# Patient Record
Sex: FEMALE | Race: BLACK OR AFRICAN AMERICAN | NOT HISPANIC OR LATINO | Employment: FULL TIME | URBAN - METROPOLITAN AREA
[De-identification: names, ages, dates, MRNs, and addresses within clinical notes are randomized per-mention and may not be internally consistent; named-entity substitution may affect disease eponyms.]

---

## 2018-06-01 ENCOUNTER — HOSPITAL ENCOUNTER (EMERGENCY)
Facility: HOSPITAL | Age: 33
Discharge: HOME | End: 2018-06-01
Attending: EMERGENCY MEDICINE

## 2018-06-01 VITALS
SYSTOLIC BLOOD PRESSURE: 134 MMHG | TEMPERATURE: 99.1 F | DIASTOLIC BLOOD PRESSURE: 79 MMHG | HEART RATE: 119 BPM | OXYGEN SATURATION: 99 %

## 2018-06-01 DIAGNOSIS — O99.891 ASYMPTOMATIC BACTERIURIA DURING PREGNANCY: ICD-10-CM

## 2018-06-01 DIAGNOSIS — R82.71 ASYMPTOMATIC BACTERIURIA DURING PREGNANCY: ICD-10-CM

## 2018-06-01 DIAGNOSIS — A59.9 TRICHOMONOSIS: ICD-10-CM

## 2018-06-01 DIAGNOSIS — Z3A.12 12 WEEKS GESTATION OF PREGNANCY: Primary | ICD-10-CM

## 2018-06-01 DIAGNOSIS — Z04.1 EXAM FOLLOWING MVC (MOTOR VEHICLE COLLISION), NO APPARENT INJURY: ICD-10-CM

## 2018-06-01 LAB
BACTERIA UR QL AUTO: ABNORMAL /HPF
BILIRUB UR QL STRIP: ABNORMAL
CLARITY UR: CLEAR
COLOR UR: YELLOW
GLUCOSE UR STRIP-MCNC: NEGATIVE MG/DL
HGB UR QL STRIP.AUTO: NEGATIVE
KETONES UR STRIP-MCNC: ABNORMAL MG/DL
LEUKOCYTE ESTERASE UR QL STRIP: NEGATIVE
NITRITE UR QL STRIP: NEGATIVE
NON-SQ EPI CELLS URNS QL MICRO: ABNORMAL /HPF
OTHER STN SPEC: ABNORMAL
PH UR STRIP.AUTO: 6 [PH] (ref 4.5–8)
PROT UR STRIP-MCNC: ABNORMAL MG/DL
RBC #/AREA URNS AUTO: ABNORMAL /HPF
SP GR UR STRIP.AUTO: >=1.03 (ref 1–1.03)
UROBILINOGEN UR QL STRIP.AUTO: 1 E.U./DL
WBC #/AREA URNS AUTO: ABNORMAL /HPF

## 2018-06-01 PROCEDURE — 99284 EMERGENCY DEPT VISIT MOD MDM: CPT

## 2018-06-01 PROCEDURE — 81001 URINALYSIS AUTO W/SCOPE: CPT

## 2018-06-01 RX ORDER — ALBUTEROL SULFATE 90 UG/1
AEROSOL, METERED RESPIRATORY (INHALATION)
COMMUNITY
Start: 2014-09-04 | End: 2019-01-28

## 2018-06-01 RX ORDER — METRONIDAZOLE 500 MG/1
2000 TABLET ORAL ONCE
Qty: 4 TABLET | Refills: 0 | Status: SHIPPED | OUTPATIENT
Start: 2018-06-01 | End: 2018-06-01

## 2018-06-01 RX ORDER — POLYVINYL ALCOHOL 14 MG/ML
SOLUTION/ DROPS OPHTHALMIC
COMMUNITY
Start: 2018-01-24 | End: 2019-01-28

## 2018-06-01 RX ORDER — CEPHALEXIN 500 MG/1
500 CAPSULE ORAL 2 TIMES DAILY
Qty: 14 CAPSULE | Refills: 0 | Status: SHIPPED | OUTPATIENT
Start: 2018-06-01 | End: 2018-06-08

## 2018-06-01 RX ORDER — LABETALOL 100 MG/1
200 TABLET, FILM COATED ORAL
COMMUNITY
Start: 2015-03-18 | End: 2019-01-28
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
## 2019-01-21 ENCOUNTER — HOSPITAL ENCOUNTER (EMERGENCY)
Facility: HOSPITAL | Age: 34
Discharge: HOME | End: 2019-01-21
Attending: EMERGENCY MEDICINE
Payer: COMMERCIAL

## 2019-01-21 VITALS
HEART RATE: 120 BPM | TEMPERATURE: 100 F | HEIGHT: 67 IN | DIASTOLIC BLOOD PRESSURE: 115 MMHG | SYSTOLIC BLOOD PRESSURE: 180 MMHG | RESPIRATION RATE: 18 BRPM | WEIGHT: 289.5 LBS | BODY MASS INDEX: 45.44 KG/M2 | OXYGEN SATURATION: 98 %

## 2019-01-21 DIAGNOSIS — L02.31 ABSCESS OF GLUTEAL CLEFT: Primary | ICD-10-CM

## 2019-01-21 PROCEDURE — 99282 EMERGENCY DEPT VISIT SF MDM: CPT

## 2019-01-21 RX ORDER — CEPHALEXIN 500 MG/1
500 CAPSULE ORAL EVERY 8 HOURS SCHEDULED
Qty: 30 CAPSULE | Refills: 0 | Status: SHIPPED | OUTPATIENT
Start: 2019-01-21 | End: 2019-01-28

## 2019-01-21 NOTE — DISCHARGE INSTRUCTIONS
Abscess   WHAT YOU NEED TO KNOW:   A warm compress may help your abscess drain  Your healthcare provider may make a cut in the abscess so it can drain  You may need surgery to remove an abscess that is on your hands or buttocks  DISCHARGE INSTRUCTIONS:   Return to the emergency department if:   · The area around your abscess becomes very painful, warm, or has red streaks  · You have a fever and chills  · Your heart is beating faster than usual      · You feel faint or confused  Contact your healthcare provider if:   · Your abscess gets bigger or does not get better  · Your abscess returns  · You have questions or concerns about your condition or care  Medicines: You may  need any of the following:  · Antibiotics  help treat a bacterial infection  · Acetaminophen  decreases pain and fever  It is available without a doctor's order  Ask how much to take and how often to take it  Follow directions  Acetaminophen can cause liver damage if not taken correctly  · NSAIDs , such as ibuprofen, help decrease swelling, pain, and fever  This medicine is available with or without a doctor's order  NSAIDs can cause stomach bleeding or kidney problems in certain people  If you take blood thinner medicine, always ask your healthcare provider if NSAIDs are safe for you  Always read the medicine label and follow directions  · Take your medicine as directed  Contact your healthcare provider if you think your medicine is not helping or if you have side effects  Tell him or her if you are allergic to any medicine  Keep a list of the medicines, vitamins, and herbs you take  Include the amounts, and when and why you take them  Bring the list or the pill bottles to follow-up visits  Carry your medicine list with you in case of an emergency  Self-care:   · Apply a warm compress to your abscess  This will help it open and drain  Wet a washcloth in warm, but not hot, water  Apply the compress for 10 minutes  Repeat this 4 times each day  Do not  press on an abscess or try to open it with a needle  You may push the bacteria deeper or into your blood  · Do not share your clothes, towels, or sheets with anyone  This can spread the infection to others  · Wash your hands often  This can help prevent the spread of germs  Use soap and water or an alcohol-based hand rub  Care for your wound after it is drained:   · Care for your wound as directed  If your healthcare provider says it is okay, carefully remove the bandage and gauze packing  You may need to soak the gauze to get it out of your wound  Clean your wound and the area around it as directed  Dry the area and put on new, clean bandages  Change your bandages when they get wet or dirty  · Ask your healthcare provider how to change the gauze in your wound  Keep track of how many pieces of gauze are placed inside the wound  Do not put too much packing in the wound  Do not pack the gauze too tightly in your wound  Follow up with your healthcare provider in 1 to 3 days: You may need to have your packing removed or your bandage changed  Write down your questions so you remember to ask them during your visits  © 2017 [employer]  Information is for End User's use only and may not be sold, redistributed or otherwise used for commercial purposes  All illustrations and images included in CareNotes® are the copyrighted property of [employer]  or [employer]manza  The above information is an  only  It is not intended as medical advice for individual conditions or treatments  Talk to your doctor, nurse or pharmacist before following any medical regimen to see if it is safe and effective for you

## 2019-01-21 NOTE — ED PROVIDER NOTES
History  Chief Complaint   Patient presents with    Abscess     patient states that she feels an abscess in between her buttocks that she noticied several days ago, denies fevers, has drainage and pain at site     Patient is a 65-year-old female who presents today with a chief complaint of abscess the right buttocks  Patient reports she has had this abscess for approximately 5 days with no fevers or chills  Patient reports that initially the pressure was too painful for her to be able to stand up and move however it began to drain yesterday and she has had decrease in pain since  Patient reports she presents to the ER due to the pain still being present in the abscess swelling still present  Patient denies any fevers, chills, headaches, dizziness, lightheadedness, changes in vision, double vision, blurry vision, abdominal pain nausea, vomiting  Patient reports that she forgot to take her blood pressure medication this morning and that she is always tachycardic  Patient reports that just prior to the provider entering the room she took 2 of her labetalol pills which are 200 mg each  History provided by:  Patient   used: No    Abscess   Location:  Pelvis  Pelvic abscess location:  L buttock  Size:  4cm  Abscess quality: draining, fluctuance, painful and warmth    Red streaking: no    Duration:  5 days  Progression:  Partially resolved  Pain details:     Quality:  Aching, pressure and throbbing    Severity:  Moderate    Duration:  5 days    Timing:  Constant    Progression:  Partially resolved  Chronicity:  New  Relieved by:  Draining/squeezing  Worsened by:  Draining/squeezing  Ineffective treatments:  Draining/squeezing  Associated symptoms: no anorexia, no fatigue, no fever, no headaches, no nausea and no vomiting    Risk factors: prior abscess        Prior to Admission Medications   Prescriptions Last Dose Informant Patient Reported? Taking?    albuterol (PROVENTIL HFA,VENTOLIN HFA) 90 mcg/act inhaler   Yes No   Sig: Inhale   labetalol (NORMODYNE) 100 mg tablet   Yes No   Sig: Take 200 mg by mouth   polyvinyl alcohol (LIQUIFILM TEARS) 1 4 % ophthalmic solution   Yes No      Facility-Administered Medications: None       Past Medical History:   Diagnosis Date    Asthma     Hypertension     Pseudotumor cerebri        Past Surgical History:   Procedure Laterality Date    TONSILLECTOMY         History reviewed  No pertinent family history  I have reviewed and agree with the history as documented  Social History   Substance Use Topics    Smoking status: Current Every Day Smoker     Packs/day: 0 50     Types: Cigarettes    Smokeless tobacco: Never Used    Alcohol use No        Review of Systems   Constitutional: Negative for chills, fatigue and fever  HENT: Negative for congestion, ear pain, rhinorrhea and sore throat  Eyes: Negative for redness  Respiratory: Negative for chest tightness and shortness of breath  Cardiovascular: Negative for chest pain and palpitations  Gastrointestinal: Negative for abdominal pain, anorexia, nausea and vomiting  Genitourinary: Negative for dysuria and hematuria  Musculoskeletal: Negative  Skin: Positive for wound ( abscess to right gluteous)  Negative for rash  Neurological: Negative for dizziness, syncope, light-headedness, numbness and headaches  Physical Exam  Physical Exam   Constitutional: She is oriented to person, place, and time  She appears well-developed and well-nourished  HENT:   Head: Normocephalic  Eyes: No scleral icterus  Cardiovascular: Normal rate and regular rhythm  Pulmonary/Chest: Effort normal and breath sounds normal  No stridor  Abdominal: Soft  She exhibits no distension  There is no tenderness  Musculoskeletal: Normal range of motion  Neurological: She is alert and oriented to person, place, and time  Skin: Skin is warm and dry  Capillary refill takes less than 2 seconds  Psychiatric: She has a normal mood and affect  Nursing note and vitals reviewed  Vital Signs  ED Triage Vitals [01/21/19 1643]   Temperature Pulse Respirations Blood Pressure SpO2   100 °F (37 8 °C) (!) 133 18 (!) 183/110 99 %      Temp Source Heart Rate Source Patient Position - Orthostatic VS BP Location FiO2 (%)   Tympanic Monitor Lying Left arm --      Pain Score       --           Vitals:    01/21/19 1643   BP: (!) 183/110   Pulse: (!) 133   Patient Position - Orthostatic VS: Lying       Visual Acuity      ED Medications  Medications - No data to display    Diagnostic Studies  Results Reviewed     None                 No orders to display              Procedures  Incision/Drainage  Date/Time: 1/21/2019 5:10 PM  Performed by: Aileen Flores by: Ramona Millard     Patient location:  ED  Other Assisting Provider: No    Consent:     Consent obtained:  Verbal    Consent given by:  Patient    Risks discussed:  Incomplete drainage    Alternatives discussed:  No treatment  Universal protocol:     Procedure explained and questions answered to patient or proxy's satisfaction: yes      Patient identity confirmed:  Verbally with patient  Location:     Type:  Abscess    Size:  4cm    Location:  Anogenital    Anogenital location:  Gluteal cleft  Anesthesia (see MAR for exact dosages): Anesthesia method:  None  Procedure details:     Complexity:  Simple    Needle aspiration: no      Incision types: Other (comment) (abscess already draining, purulent drainage expressed and cleaned with saline)    Approach:  Open    Wound management:  Irrigated with saline    Drainage:  Bloody and purulent    Drainage amount:  Copious    Wound treatment:  Wound left open    Packing materials:  None  Post-procedure details:     Patient tolerance of procedure:   Tolerated well, no immediate complications           Phone Contacts  ED Phone Contact    ED Course                               GIUSEPPE Ku Time    Disposition  Final diagnoses:   Abscess of gluteal cleft     Time reflects when diagnosis was documented in both MDM as applicable and the Disposition within this note     Time User Action Codes Description Comment    1/21/2019  5:13 PM Rona Duran [L02 31] Abscess of gluteal cleft       ED Disposition     ED Disposition Condition Comment    Discharge  Kevin Padron discharge to home/self care  Condition at discharge: Good        Follow-up Information     Follow up With Specialties Details Why [address] [place] [address] MD Susy [facility]   [address] [place] [address] 305  [address] [place] [address]  [phone]            Patient's Medications   Discharge Prescriptions    CEPHALEXIN (KEFLEX) 500 MG CAPSULE    Take 1 capsule (500 mg total) by mouth every 8 (eight) hours for 10 days       Start Date: 1/21/2019 End Date: 1/31/2019       Order Dose: 500 mg       Quantity: 30 capsule    Refills: 0     No discharge procedures on file      ED Provider  Electronically Signed by           Rainer Forman PA-C  01/21/19 9676

## 2019-01-28 ENCOUNTER — OFFICE VISIT (OUTPATIENT)
Dept: FAMILY MEDICINE CLINIC | Facility: CLINIC | Age: 34
End: 2019-01-28

## 2019-01-28 ENCOUNTER — TELEPHONE (OUTPATIENT)
Dept: FAMILY MEDICINE CLINIC | Facility: CLINIC | Age: 34
End: 2019-01-28

## 2019-01-28 VITALS
WEIGHT: 292 LBS | HEART RATE: 71 BPM | DIASTOLIC BLOOD PRESSURE: 108 MMHG | BODY MASS INDEX: 45.73 KG/M2 | SYSTOLIC BLOOD PRESSURE: 168 MMHG | RESPIRATION RATE: 20 BRPM | OXYGEN SATURATION: 98 % | TEMPERATURE: 97.4 F

## 2019-01-28 DIAGNOSIS — I10 BENIGN ESSENTIAL HTN: Primary | ICD-10-CM

## 2019-01-28 DIAGNOSIS — J45.20 MILD INTERMITTENT ASTHMA WITHOUT COMPLICATION: ICD-10-CM

## 2019-01-28 PROBLEM — K21.9 GASTROESOPHAGEAL REFLUX DISEASE WITHOUT ESOPHAGITIS: Status: ACTIVE | Noted: 2018-07-26

## 2019-01-28 PROCEDURE — 99214 OFFICE O/P EST MOD 30 MIN: CPT | Performed by: FAMILY MEDICINE

## 2019-01-28 RX ORDER — LABETALOL 200 MG/1
TABLET, FILM COATED ORAL
COMMUNITY
Start: 2018-12-24 | End: 2019-02-06 | Stop reason: SDUPTHER

## 2019-01-28 RX ORDER — AMLODIPINE BESYLATE 10 MG/1
10 TABLET ORAL DAILY
Qty: 30 TABLET | Refills: 0 | Status: SHIPPED | OUTPATIENT
Start: 2019-01-28 | End: 2019-02-06 | Stop reason: SDUPTHER

## 2019-01-28 RX ORDER — BLOOD PRESSURE TEST KIT
KIT MISCELLANEOUS DAILY
Qty: 1 EACH | Refills: 0 | Status: SHIPPED | OUTPATIENT
Start: 2019-01-28

## 2019-01-28 NOTE — TELEPHONE ENCOUNTER
Dr James Rosas would like to see patient in 3 weeks with me for HTN follow-up, ok to double book   Patient needs an appt after 10am

## 2019-01-28 NOTE — ASSESSMENT & PLAN NOTE
- Started on CCB and continue BB at this time with plan to wean off in future  - Restrict daily salt intake up to 2 4 g  - Discussed DASH diet, hand out given  - Advised to abstain for alcohol consumption  - Advised to walk 30 minutes a day 5 times a week and practice stress relieving measures such as meditation, yoga, riley chi      S/E of CCB discussed with patient   BP cuff script given  Advised to keep a log of BP and bring them back to next visit  RTC in 2 weeks for nurse visit for BP check

## 2019-01-28 NOTE — PROGRESS NOTES
Assessment/Plan:    Benign essential HTN  - Started on CCB and continue BB at this time with plan to wean off in future  - Restrict daily salt intake up to 2 4 g  - Discussed DASH diet, hand out given  - Advised to abstain for alcohol consumption  - Advised to walk 30 minutes a day 5 times a week and practice stress relieving measures such as meditation, yoga, riley chi      S/E of CCB discussed with patient   BP cuff script given  Advised to keep a log of BP and bring them back to next visit  RTC in 2 weeks for nurse visit for BP check       Mild intermittent asthma without complication  Well controlled on Albuterol PRN         Diagnoses and all orders for this visit:    Benign essential HTN  -     amLODIPine (NORVASC) 10 mg tablet; Take 1 tablet (10 mg total) by mouth daily  -     Blood Pressure KIT; by Does not apply route daily    Mild intermittent asthma without complication    Other orders  -     albuterol (PROVENTIL HFA,VENTOLIN HFA) 90 mcg/act inhaler; Inhale  -     labetalol (NORMODYNE) 200 mg tablet;           Subjective:      Patient ID: Miles Padgett is a 28 y o  female  33F with PMH significant for HTN, gestational DM, Iron deficiency anemia, pseudotumor cereberi (follows with neuro at [facility], due to see them in 3 days), Bipolar depression (not on meds at this time), post-partum now 7 weeks presents for follow-up  Patient was last seen here two years ago  HTN:  Was started on BB and HCTZ however had to stop HCTZ due to hair loss  She is currently taking BB and reports compliance  Smoker of 4 cigs per day x 14 years  Denies ETOH or drug abuse        The following portions of the patient's history were reviewed and updated as appropriate: allergies, current medications, past family history, past medical history, past social history, past surgical history and problem list     Review of Systems   Constitutional: Negative for chills and fever     Respiratory: Negative for cough and shortness of breath  Cardiovascular: Negative for chest pain and palpitations  Endocrine: Negative for polyphagia and polyuria  Musculoskeletal: Negative for back pain  Neurological: Negative for light-headedness, numbness and headaches  Objective:      BP (!) 168/108 (BP Location: Left arm, Patient Position: Sitting, Cuff Size: Large)   Pulse 71   Temp (!) 97 4 °F (36 3 °C)   Resp 20   Wt 132 kg (292 lb)   SpO2 98%   BMI 45 73 kg/m²          Physical Exam   Constitutional: She appears well-developed and well-nourished  HENT:   Head: Normocephalic and atraumatic  Eyes: EOM are normal    Neck: Normal range of motion  Neck supple  Cardiovascular: Normal rate and normal heart sounds  Pulmonary/Chest: Effort normal and breath sounds normal  No respiratory distress  Abdominal: Soft  Bowel sounds are normal  She exhibits no distension  Neurological: She is alert  Skin: Skin is warm and dry  Psychiatric: She has a normal mood and affect

## 2019-02-06 DIAGNOSIS — I10 BENIGN ESSENTIAL HTN: ICD-10-CM

## 2019-02-06 RX ORDER — LABETALOL 200 MG/1
TABLET, FILM COATED ORAL
Qty: 120 TABLET | Refills: 1 | Status: SHIPPED | OUTPATIENT
Start: 2019-02-06 | End: 2019-08-02

## 2019-02-06 RX ORDER — AMLODIPINE BESYLATE 10 MG/1
TABLET ORAL
Qty: 30 TABLET | Refills: 4 | Status: SHIPPED | OUTPATIENT
Start: 2019-02-06 | End: 2019-09-28 | Stop reason: SDUPTHER

## 2019-02-22 ENCOUNTER — OFFICE VISIT (OUTPATIENT)
Dept: FAMILY MEDICINE CLINIC | Facility: CLINIC | Age: 34
End: 2019-02-22

## 2019-02-22 VITALS
RESPIRATION RATE: 18 BRPM | SYSTOLIC BLOOD PRESSURE: 134 MMHG | HEART RATE: 94 BPM | DIASTOLIC BLOOD PRESSURE: 92 MMHG | BODY MASS INDEX: 44.57 KG/M2 | WEIGHT: 284 LBS | TEMPERATURE: 97.6 F | OXYGEN SATURATION: 98 % | HEIGHT: 67 IN

## 2019-02-22 DIAGNOSIS — I10 BENIGN ESSENTIAL HTN: ICD-10-CM

## 2019-02-22 DIAGNOSIS — R00.2 PALPITATIONS: ICD-10-CM

## 2019-02-22 DIAGNOSIS — L65.9 ALOPECIA: Primary | ICD-10-CM

## 2019-02-22 PROCEDURE — 93000 ELECTROCARDIOGRAM COMPLETE: CPT | Performed by: FAMILY MEDICINE

## 2019-02-22 PROCEDURE — 99213 OFFICE O/P EST LOW 20 MIN: CPT | Performed by: FAMILY MEDICINE

## 2019-02-22 NOTE — ASSESSMENT & PLAN NOTE
- BP much better controlled   - Restrict daily salt intake up to 2 4 g  - Discussed DASH diet, hand out given  - Advised to abstain for alcohol consumption  - Advised to walk 30 minutes a day 5 times a week and practice stress relieving measures such as meditation, yoga, riley chi

## 2019-02-22 NOTE — PROGRESS NOTES
Assessment/Plan:    Palpitations  EKG: NSR @ rate 94bpms  No ST-T abnormality  Check TSH, CBC, CMP, A1C and Lipid panel  Advised patient to decrease caffeine intake      Benign essential HTN  - BP much better controlled   - Restrict daily salt intake up to 2 4 g  - Discussed DASH diet, hand out given  - Advised to abstain for alcohol consumption  - Advised to walk 30 minutes a day 5 times a week and practice stress relieving measures such as meditation, yoga, riley chi        Alopecia  Occipital alopecia  Referred to Derm         Diagnoses and all orders for this visit:    Alopecia  -     Ambulatory referral to Dermatology  -     TSH, 3rd generation with Free T4 reflex; Future  -     CBC and differential; Future  -     Comprehensive metabolic panel; Future  -     Lipid Panel with Direct LDL reflex; Future  -     Microalbumin / creatinine urine ratio    Benign essential HTN    Palpitations          Subjective:      Patient ID: Jonathan Mcclain is a 28 y o  female  33F with PMH significant for HTN, gestational DM, Iron deficiency anemia, pseudotumor cereberi (follows with neuro at [facility], due to see them in 3 days), Bipolar depression (not on meds at this time), post-partum x 2 months presents for follow-up of HTN    HTN:  She reports compliance  Denies CP/SOB  +Palpitations  Reports history of tachycardia  States she follows with Cardiologist Dr Skinny Katz of [facility]  Denies swelling in legs/STRONG      The following portions of the patient's history were reviewed and updated as appropriate: allergies, current medications, past family history, past medical history, past social history, past surgical history and problem list     Review of Systems   Constitutional: Negative for chills and fever  Respiratory: Negative for cough and shortness of breath  Cardiovascular: Positive for palpitations  Negative for chest pain  Endocrine: Negative for polyphagia and polyuria  Musculoskeletal: Negative for back pain  Neurological: Negative for light-headedness, numbness and headaches  Objective:      /92 (BP Location: Right arm, Patient Position: Sitting, Cuff Size: Large)   Pulse 94   Temp 97 6 °F (36 4 °C) (Temporal)   Resp 18   Ht 5' 7" (1 702 m)   Wt 129 kg (284 lb)   SpO2 98%   BMI 44 48 kg/m²          Physical Exam   Constitutional: She appears well-developed and well-nourished  HENT:   Head: Normocephalic and atraumatic  Hair is abnormal        Eyes: EOM are normal    Neck: Normal range of motion  Neck supple  Cardiovascular: Normal rate and normal heart sounds  Pulmonary/Chest: Effort normal and breath sounds normal  No respiratory distress  Abdominal: Soft  Bowel sounds are normal  She exhibits no distension  Neurological: She is alert  Skin: Skin is warm and dry  Psychiatric: She has a normal mood and affect

## 2019-02-22 NOTE — ASSESSMENT & PLAN NOTE
EKG: NSR @ rate 94bpms   No ST-T abnormality  Check TSH, CBC, CMP, A1C and Lipid panel  Advised patient to decrease caffeine intake

## 2019-04-08 ENCOUNTER — APPOINTMENT (OUTPATIENT)
Dept: LAB | Facility: HOSPITAL | Age: 34
End: 2019-04-08
Payer: COMMERCIAL

## 2019-04-08 DIAGNOSIS — L65.9 ALOPECIA: ICD-10-CM

## 2019-04-08 LAB
ALBUMIN SERPL BCP-MCNC: 4.3 G/DL (ref 3–5.2)
ALP SERPL-CCNC: 62 U/L (ref 43–122)
ALT SERPL W P-5'-P-CCNC: 15 U/L (ref 9–52)
ANION GAP SERPL CALCULATED.3IONS-SCNC: 11 MMOL/L (ref 5–14)
AST SERPL W P-5'-P-CCNC: 14 U/L (ref 14–36)
BASOPHILS # BLD AUTO: 0 THOUSANDS/ΜL (ref 0–0.1)
BASOPHILS NFR BLD AUTO: 1 % (ref 0–1)
BILIRUB SERPL-MCNC: 0.3 MG/DL
BUN SERPL-MCNC: 11 MG/DL (ref 5–25)
CALCIUM SERPL-MCNC: 9.2 MG/DL (ref 8.4–10.2)
CHLORIDE SERPL-SCNC: 107 MMOL/L (ref 97–108)
CHOLEST SERPL-MCNC: 196 MG/DL
CO2 SERPL-SCNC: 21 MMOL/L (ref 22–30)
CREAT SERPL-MCNC: 0.78 MG/DL (ref 0.6–1.2)
EOSINOPHIL # BLD AUTO: 0.2 THOUSAND/ΜL (ref 0–0.4)
EOSINOPHIL NFR BLD AUTO: 2 % (ref 0–6)
ERYTHROCYTE [DISTWIDTH] IN BLOOD BY AUTOMATED COUNT: 12.9 %
GFR SERPL CREATININE-BSD FRML MDRD: 115 ML/MIN/1.73SQ M
GLUCOSE P FAST SERPL-MCNC: 104 MG/DL (ref 70–99)
HCT VFR BLD AUTO: 34.3 % (ref 36–46)
HDLC SERPL-MCNC: 43 MG/DL (ref 40–59)
HGB BLD-MCNC: 11.3 G/DL (ref 12–16)
LDLC SERPL CALC-MCNC: 134 MG/DL
LYMPHOCYTES # BLD AUTO: 2.3 THOUSANDS/ΜL (ref 0.5–4)
LYMPHOCYTES NFR BLD AUTO: 28 % (ref 25–45)
MCH RBC QN AUTO: 29.7 PG (ref 26–34)
MCHC RBC AUTO-ENTMCNC: 32.9 G/DL (ref 31–36)
MCV RBC AUTO: 90 FL (ref 80–100)
MONOCYTES # BLD AUTO: 0.4 THOUSAND/ΜL (ref 0.2–0.9)
MONOCYTES NFR BLD AUTO: 4 % (ref 1–10)
NEUTROPHILS # BLD AUTO: 5.5 THOUSANDS/ΜL (ref 1.8–7.8)
NEUTS SEG NFR BLD AUTO: 66 % (ref 45–65)
PLATELET # BLD AUTO: 333 THOUSANDS/UL (ref 150–450)
PMV BLD AUTO: 9.5 FL (ref 8.9–12.7)
POTASSIUM SERPL-SCNC: 3.6 MMOL/L (ref 3.6–5)
PROT SERPL-MCNC: 7.6 G/DL (ref 5.9–8.4)
RBC # BLD AUTO: 3.8 MILLION/UL (ref 4–5.2)
SODIUM SERPL-SCNC: 139 MMOL/L (ref 137–147)
TRIGL SERPL-MCNC: 97 MG/DL
TSH SERPL DL<=0.05 MIU/L-ACNC: 1.64 UIU/ML (ref 0.47–4.68)
WBC # BLD AUTO: 8.4 THOUSAND/UL (ref 4.5–11)

## 2019-04-08 PROCEDURE — 85025 COMPLETE CBC W/AUTO DIFF WBC: CPT

## 2019-04-08 PROCEDURE — 80053 COMPREHEN METABOLIC PANEL: CPT

## 2019-04-08 PROCEDURE — 84443 ASSAY THYROID STIM HORMONE: CPT

## 2019-04-08 PROCEDURE — 80061 LIPID PANEL: CPT

## 2019-04-08 PROCEDURE — 36415 COLL VENOUS BLD VENIPUNCTURE: CPT

## 2019-04-12 ENCOUNTER — TELEPHONE (OUTPATIENT)
Dept: FAMILY MEDICINE CLINIC | Facility: CLINIC | Age: 34
End: 2019-04-12

## 2019-04-22 ENCOUNTER — OFFICE VISIT (OUTPATIENT)
Dept: FAMILY MEDICINE CLINIC | Facility: CLINIC | Age: 34
End: 2019-04-22

## 2019-04-22 VITALS
HEIGHT: 67 IN | HEART RATE: 100 BPM | RESPIRATION RATE: 18 BRPM | SYSTOLIC BLOOD PRESSURE: 130 MMHG | OXYGEN SATURATION: 99 % | TEMPERATURE: 98.9 F | DIASTOLIC BLOOD PRESSURE: 80 MMHG | WEIGHT: 293 LBS | BODY MASS INDEX: 45.99 KG/M2

## 2019-04-22 DIAGNOSIS — Z02.89 ENCOUNTER FOR PHYSICAL EXAMINATION RELATED TO EMPLOYMENT: Primary | ICD-10-CM

## 2019-04-22 DIAGNOSIS — Z23 ENCOUNTER FOR IMMUNIZATION: ICD-10-CM

## 2019-04-22 DIAGNOSIS — F31.81 BIPOLAR II DISORDER (HCC): ICD-10-CM

## 2019-04-22 PROCEDURE — 86580 TB INTRADERMAL TEST: CPT

## 2019-04-22 PROCEDURE — 99213 OFFICE O/P EST LOW 20 MIN: CPT | Performed by: PHYSICIAN ASSISTANT

## 2019-04-24 ENCOUNTER — CLINICAL SUPPORT (OUTPATIENT)
Dept: FAMILY MEDICINE CLINIC | Facility: CLINIC | Age: 34
End: 2019-04-24

## 2019-04-24 DIAGNOSIS — Z11.1 ENCOUNTER FOR PPD SKIN TEST READING: Primary | ICD-10-CM

## 2019-04-24 LAB
INDURATION: 0 MM
TB SKIN TEST: NEGATIVE

## 2019-05-18 ENCOUNTER — APPOINTMENT (EMERGENCY)
Dept: RADIOLOGY | Facility: HOSPITAL | Age: 34
End: 2019-05-18
Payer: COMMERCIAL

## 2019-05-18 ENCOUNTER — HOSPITAL ENCOUNTER (EMERGENCY)
Facility: HOSPITAL | Age: 34
Discharge: HOME | End: 2019-05-18
Attending: EMERGENCY MEDICINE | Admitting: EMERGENCY MEDICINE
Payer: COMMERCIAL

## 2019-05-18 VITALS
WEIGHT: 293 LBS | BODY MASS INDEX: 48.34 KG/M2 | RESPIRATION RATE: 20 BRPM | TEMPERATURE: 98.4 F | SYSTOLIC BLOOD PRESSURE: 177 MMHG | DIASTOLIC BLOOD PRESSURE: 106 MMHG | OXYGEN SATURATION: 97 % | HEART RATE: 85 BPM

## 2019-05-18 DIAGNOSIS — S69.91XA RIGHT WRIST INJURY, INITIAL ENCOUNTER: Primary | ICD-10-CM

## 2019-05-18 PROCEDURE — 29125 APPL SHORT ARM SPLINT STATIC: CPT | Performed by: PHYSICIAN ASSISTANT

## 2019-05-18 PROCEDURE — 99283 EMERGENCY DEPT VISIT LOW MDM: CPT | Performed by: PHYSICIAN ASSISTANT

## 2019-05-18 PROCEDURE — 99283 EMERGENCY DEPT VISIT LOW MDM: CPT

## 2019-05-18 PROCEDURE — 73110 X-RAY EXAM OF WRIST: CPT

## 2019-05-18 RX ORDER — ACETAMINOPHEN 500 MG
500 TABLET ORAL EVERY 6 HOURS PRN
Qty: 30 TABLET | Refills: 0 | Status: SHIPPED | OUTPATIENT
Start: 2019-05-18 | End: 2020-03-05

## 2019-05-18 RX ORDER — AMLODIPINE BESYLATE 5 MG/1
10 TABLET ORAL ONCE
Status: COMPLETED | OUTPATIENT
Start: 2019-05-18 | End: 2019-05-18

## 2019-05-18 RX ORDER — ACETAMINOPHEN 325 MG/1
650 TABLET ORAL ONCE
Status: COMPLETED | OUTPATIENT
Start: 2019-05-18 | End: 2019-05-18

## 2019-05-18 RX ORDER — LABETALOL 100 MG/1
200 TABLET, FILM COATED ORAL ONCE
Status: COMPLETED | OUTPATIENT
Start: 2019-05-18 | End: 2019-05-18

## 2019-05-18 RX ADMIN — ACETAMINOPHEN 650 MG: 325 TABLET ORAL at 09:20

## 2019-05-18 RX ADMIN — LABETALOL HYDROCHLORIDE 200 MG: 100 TABLET, FILM COATED ORAL at 09:20

## 2019-05-18 RX ADMIN — AMLODIPINE BESYLATE 10 MG: 5 TABLET ORAL at 09:20

## 2019-05-29 ENCOUNTER — HOSPITAL ENCOUNTER (EMERGENCY)
Facility: HOSPITAL | Age: 34
Discharge: HOME | End: 2019-05-29
Attending: EMERGENCY MEDICINE
Payer: COMMERCIAL

## 2019-05-29 VITALS
SYSTOLIC BLOOD PRESSURE: 169 MMHG | OXYGEN SATURATION: 100 % | DIASTOLIC BLOOD PRESSURE: 90 MMHG | BODY MASS INDEX: 49.02 KG/M2 | RESPIRATION RATE: 20 BRPM | TEMPERATURE: 100.1 F | HEART RATE: 111 BPM | WEIGHT: 293 LBS

## 2019-05-29 DIAGNOSIS — J02.9 VIRAL PHARYNGITIS: Primary | ICD-10-CM

## 2019-05-29 LAB — S PYO AG THROAT QL: NEGATIVE

## 2019-05-29 PROCEDURE — 87430 STREP A AG IA: CPT | Performed by: PHYSICIAN ASSISTANT

## 2019-05-29 PROCEDURE — 99283 EMERGENCY DEPT VISIT LOW MDM: CPT

## 2019-05-29 PROCEDURE — 99282 EMERGENCY DEPT VISIT SF MDM: CPT | Performed by: PHYSICIAN ASSISTANT

## 2019-05-29 RX ORDER — LORATADINE 10 MG/1
10 TABLET ORAL DAILY
Qty: 30 TABLET | Refills: 0 | Status: SHIPPED | OUTPATIENT
Start: 2019-05-29 | End: 2020-03-05

## 2019-05-29 RX ORDER — ACETAMINOPHEN 500 MG
500 TABLET ORAL EVERY 6 HOURS PRN
Qty: 30 TABLET | Refills: 0 | Status: SHIPPED | OUTPATIENT
Start: 2019-05-29 | End: 2022-07-13

## 2019-08-02 ENCOUNTER — OFFICE VISIT (OUTPATIENT)
Dept: FAMILY MEDICINE CLINIC | Facility: CLINIC | Age: 34
End: 2019-08-02

## 2019-08-02 VITALS
BODY MASS INDEX: 45.99 KG/M2 | HEIGHT: 67 IN | HEART RATE: 105 BPM | RESPIRATION RATE: 18 BRPM | SYSTOLIC BLOOD PRESSURE: 148 MMHG | TEMPERATURE: 97.8 F | OXYGEN SATURATION: 97 % | DIASTOLIC BLOOD PRESSURE: 96 MMHG | WEIGHT: 293 LBS

## 2019-08-02 DIAGNOSIS — I10 BENIGN ESSENTIAL HTN: ICD-10-CM

## 2019-08-02 DIAGNOSIS — E66.01 MORBID OBESITY (HCC): Primary | ICD-10-CM

## 2019-08-02 PROBLEM — L65.9 ALOPECIA: Status: RESOLVED | Noted: 2019-02-22 | Resolved: 2019-08-02

## 2019-08-02 PROBLEM — R00.2 PALPITATIONS: Status: RESOLVED | Noted: 2019-02-22 | Resolved: 2019-08-02

## 2019-08-02 PROCEDURE — 99213 OFFICE O/P EST LOW 20 MIN: CPT | Performed by: FAMILY MEDICINE

## 2019-08-02 RX ORDER — LABETALOL 200 MG/1
200 TABLET, FILM COATED ORAL 2 TIMES DAILY
COMMUNITY
End: 2019-11-04

## 2019-08-02 NOTE — ASSESSMENT & PLAN NOTE
Well controlled as patient reports checking and is consistently below 130/80    Plan to continue with labetalol 200 mg 2 times a day along with amlodipine 10 mg daily     - Restrict daily salt intake up to 2 4 g  - Discussed DASH diet, hand out given  - Advised to abstain for alcohol consumption  - Advised to walk 30 minutes a day 5 times a week and practice stress relieving measures such as meditation, yoga, riley chi

## 2019-08-02 NOTE — PROGRESS NOTES
Assessment/Plan:    Benign essential HTN  Well controlled as patient reports checking and is consistently below 130/80  Plan to continue with labetalol 200 mg 2 times a day along with amlodipine 10 mg daily     - Restrict daily salt intake up to 2 4 g  - Discussed DASH diet, hand out given  - Advised to abstain for alcohol consumption  - Advised to walk 30 minutes a day 5 times a week and practice stress relieving measures such as meditation, yoga, riley chi        Morbid obesity ([facility] [address] )  Advised patient to discuss with her Ob to stop depo shots as it may cause weight gain  Plan to refer for bariatric surgery in future visit  In the interim, patient is encouraged to continue to work on diet and exercise  Diagnoses and all orders for this visit:    Morbid obesity ([address] )    Benign essential HTN    Other orders  -     labetalol (NORMODYNE) 200 mg tablet; Take 200 mg by mouth 2 (two) times a day          Subjective:      Patient ID: Sarwat Gao is a 28 y o  female  Patient is a 79-year-old female with past medical history significant for morbid obesity, HTN who presents for follow-up  Reports doing well and denies any complaints  Reports frustration with her weight gain  She is currently on Depo shots by her OB  The following portions of the patient's history were reviewed and updated as appropriate: allergies, current medications and problem list     Review of Systems   Constitutional: Negative for chills and fever  Respiratory: Negative for cough and shortness of breath  Cardiovascular: Negative for chest pain and palpitations  Endocrine: Negative for polyphagia and polyuria  Musculoskeletal: Negative for back pain  Neurological: Negative for light-headedness, numbness and headaches  Objective:      /96 (BP Location: Left arm, Patient Position: Sitting, Cuff Size: Large) Comment: pt did not take med today    Pulse 105   Temp 97 8 °F (36 6 °C) (Temporal)   Resp 18   Ht 5' 7" (1 702 m)   Wt (!) 142 kg (313 lb)   SpO2 97%   BMI 49 02 kg/m²          Physical Exam   Constitutional: She appears well-developed and well-nourished  HENT:   Head: Normocephalic and atraumatic  Eyes: EOM are normal    Neck: Normal range of motion  Neck supple  Cardiovascular: Normal rate and normal heart sounds  Pulmonary/Chest: Effort normal and breath sounds normal  No respiratory distress  Abdominal: Soft  Bowel sounds are normal  She exhibits no distension  Neurological: She is alert  Skin: Skin is warm and dry  Psychiatric: She has a normal mood and affect

## 2019-08-02 NOTE — ASSESSMENT & PLAN NOTE
Advised patient to discuss with her Ob to stop depo shots as it may cause weight gain  Plan to refer for bariatric surgery in future visit  In the interim, patient is encouraged to continue to work on diet and exercise

## 2019-09-28 DIAGNOSIS — I10 BENIGN ESSENTIAL HTN: ICD-10-CM

## 2019-09-30 RX ORDER — AMLODIPINE BESYLATE 10 MG/1
TABLET ORAL
Qty: 30 TABLET | Refills: 0 | Status: SHIPPED | OUTPATIENT
Start: 2019-09-30 | End: 2019-11-04 | Stop reason: SDUPTHER

## 2019-11-04 ENCOUNTER — OFFICE VISIT (OUTPATIENT)
Dept: FAMILY MEDICINE CLINIC | Facility: CLINIC | Age: 34
End: 2019-11-04

## 2019-11-04 VITALS
HEART RATE: 90 BPM | SYSTOLIC BLOOD PRESSURE: 140 MMHG | RESPIRATION RATE: 18 BRPM | BODY MASS INDEX: 45.99 KG/M2 | OXYGEN SATURATION: 98 % | WEIGHT: 293 LBS | DIASTOLIC BLOOD PRESSURE: 84 MMHG | TEMPERATURE: 97.5 F | HEIGHT: 67 IN

## 2019-11-04 DIAGNOSIS — J45.20 MILD INTERMITTENT ASTHMA WITHOUT COMPLICATION: Primary | ICD-10-CM

## 2019-11-04 DIAGNOSIS — Z72.0 TOBACCO USE: ICD-10-CM

## 2019-11-04 DIAGNOSIS — I10 BENIGN ESSENTIAL HTN: ICD-10-CM

## 2019-11-04 PROCEDURE — 90471 IMMUNIZATION ADMIN: CPT | Performed by: INTERNAL MEDICINE

## 2019-11-04 PROCEDURE — 99213 OFFICE O/P EST LOW 20 MIN: CPT | Performed by: INTERNAL MEDICINE

## 2019-11-04 PROCEDURE — 3008F BODY MASS INDEX DOCD: CPT | Performed by: INTERNAL MEDICINE

## 2019-11-04 PROCEDURE — 90682 RIV4 VACC RECOMBINANT DNA IM: CPT | Performed by: INTERNAL MEDICINE

## 2019-11-04 RX ORDER — AMLODIPINE BESYLATE 10 MG/1
10 TABLET ORAL DAILY
Qty: 90 TABLET | Refills: 3 | Status: SHIPPED | OUTPATIENT
Start: 2019-11-04 | End: 2020-10-27 | Stop reason: SDUPTHER

## 2019-11-04 RX ORDER — HYDROCHLOROTHIAZIDE 25 MG/1
25 TABLET ORAL DAILY
Qty: 30 TABLET | Refills: 0 | Status: SHIPPED | OUTPATIENT
Start: 2019-11-04 | End: 2019-12-31 | Stop reason: SDUPTHER

## 2019-11-04 RX ORDER — LABETALOL 100 MG/1
100 TABLET, FILM COATED ORAL 2 TIMES DAILY
Qty: 60 TABLET | Refills: 0 | Status: SHIPPED | OUTPATIENT
Start: 2019-11-04 | End: 2019-12-31 | Stop reason: SDUPTHER

## 2019-11-04 RX ORDER — ALBUTEROL SULFATE 90 UG/1
2 AEROSOL, METERED RESPIRATORY (INHALATION) EVERY 6 HOURS PRN
Qty: 2 INHALER | Refills: 2 | Status: SHIPPED | OUTPATIENT
Start: 2019-11-04 | End: 2022-04-07 | Stop reason: SDUPTHER

## 2019-11-04 NOTE — ASSESSMENT & PLAN NOTE
Discussed against harmful affects of smoking with patient including various cancers  Offered nicotine replacement however given multiple stressors in household currently, will hold off inpatient patient to quit smoking  Reassess on next visit

## 2019-11-04 NOTE — ASSESSMENT & PLAN NOTE
Not in exacerbation currently  However given that patient is a  prescribed her two inhalers, 1 to keep in the bus and 1 to keep inside the house  Received flu shot today

## 2019-11-04 NOTE — ASSESSMENT & PLAN NOTE
These instructions were attached to AVS:  Start hydrochlorothiazide 25 mg 1 tablet by mouth daily  Start labetalol 100 mg 1 tablet by mouth 2 times a day  Continue with amlodipine 10 mg 1 tab by mouth daily  Check BMP (basic metabolic panel) in 3 weeks  Follow-up appointment with me, PCP, in 1 month to check on blood pressure

## 2019-11-04 NOTE — PATIENT INSTRUCTIONS
Start hydrochlorothiazide 25 mg 1 tablet by mouth daily  Start labetalol 100 mg 1 tablet by mouth 2 times a day  Continue with amlodipine 10 mg 1 tab by mouth daily  Check BMP (basic metabolic panel) in 3 weeks  Follow-up appointment with me, PCP, in 1 month to check on blood pressure

## 2019-11-04 NOTE — PROGRESS NOTES
Assessment/Plan:    Benign essential HTN  These instructions were attached to AVS:  Start hydrochlorothiazide 25 mg 1 tablet by mouth daily  Start labetalol 100 mg 1 tablet by mouth 2 times a day  Continue with amlodipine 10 mg 1 tab by mouth daily  Check BMP (basic metabolic panel) in 3 weeks  Follow-up appointment with me, PCP, in 1 month to check on blood pressure  Mild intermittent asthma without complication  Not in exacerbation currently  However given that patient is a  prescribed her two inhalers, 1 to keep in the bus and 1 to keep inside the house  Received flu shot today  Tobacco use  Discussed against harmful affects of smoking with patient including various cancers  Offered nicotine replacement however given multiple stressors in household currently, will hold off inpatient patient to quit smoking  Reassess on next visit  Diagnoses and all orders for this visit:    Mild intermittent asthma without complication  -     albuterol (PROVENTIL HFA,VENTOLIN HFA) 90 mcg/act inhaler; Inhale 2 puffs every 6 (six) hours as needed for wheezing or shortness of breath  -     influenza vaccine, [id number], quadrivalent, recombinant, PF, 0 5 mL, for patients 18 yr+ (FLUBLOK)    Benign essential HTN  -     labetalol (NORMODYNE) 100 mg tablet; Take 1 tablet (100 mg total) by mouth 2 (two) times a day  -     amLODIPine (NORVASC) 10 mg tablet; Take 1 tablet (10 mg total) by mouth daily  -     hydrochlorothiazide (HYDRODIURIL) 25 mg tablet; Take 1 tablet (25 mg total) by mouth daily  -     Basic metabolic panel; Future    Tobacco use          Subjective:      Patient ID: Yves Hatchet is a 27 y o  female  Patient is a 41-year-old female with past medical history significant for HTN, benign intracranial hypertension, bipolar disorder who returns for follow-up  HTN  Currently managed with amlodipine 10 mg daily along with labetalol 200 mg 2 times daily    She reports compliance with this regimen  She checks her blood pressure often at home and averages 140-150/  Denies CP, STRONG or SOB  Denies headaches or visual changes  Denies any symptomatology to suggest side effects from this regimen  She was initially seen by me in office in   At that time she was on labetalol 200 mg b i d  due to history of gestational HTN  She is now postpartum more than a year ago  Recently amlodipine was added to her regimen due to continous elevated blood pressure  Today blood pressure is, 140/84  Tobacco abuse  Continues to smoke 1 pack per day  States this very difficult to quit as she has had numerous stressors recently  Her older son was diagnosed with pneumonia and was sent home from school  Her 2nd son developed hand-foot-mouth disease in give it to the   States the new born is currently battling hypoxemia  This has been very stressful for her  States this difficult for her to think about quitting until her kids feel better  Review of Systems   Constitutional: Negative for chills and fever  Respiratory: Negative for cough and shortness of breath  Cardiovascular: Negative for chest pain and palpitations  Endocrine: Negative for polyphagia and polyuria  Musculoskeletal: Negative for back pain  Neurological: Negative for light-headedness, numbness and headaches  Objective:      /84 (BP Location: Left arm, Patient Position: Sitting, Cuff Size: Large)   Pulse 90   Temp 97 5 °F (36 4 °C) (Temporal)   Resp 18   Ht 5' 7" (1 702 m)   Wt (!) 144 kg (317 lb)   BMI 49 65 kg/m²          Physical Exam   Constitutional: She appears well-developed and well-nourished  HENT:   Head: Normocephalic and atraumatic  Eyes: EOM are normal    Neck: Normal range of motion  Neck supple  Cardiovascular: Normal rate and normal heart sounds  Pulmonary/Chest: Effort normal and breath sounds normal  No respiratory distress  Abdominal: Soft   Bowel sounds are normal  She exhibits no distension  Neurological: She is alert  Skin: Skin is warm and dry  Psychiatric: She has a normal mood and affect

## 2019-12-31 DIAGNOSIS — I10 BENIGN ESSENTIAL HTN: ICD-10-CM

## 2020-01-02 RX ORDER — LABETALOL 100 MG/1
100 TABLET, FILM COATED ORAL 2 TIMES DAILY
Qty: 60 TABLET | Refills: 0 | Status: SHIPPED | OUTPATIENT
Start: 2020-01-02 | End: 2020-02-24

## 2020-01-02 RX ORDER — HYDROCHLOROTHIAZIDE 25 MG/1
25 TABLET ORAL DAILY
Qty: 30 TABLET | Refills: 0 | Status: SHIPPED | OUTPATIENT
Start: 2020-01-02 | End: 2020-03-05

## 2020-02-24 DIAGNOSIS — I10 BENIGN ESSENTIAL HTN: ICD-10-CM

## 2020-02-24 RX ORDER — LABETALOL 100 MG/1
TABLET, FILM COATED ORAL
Qty: 60 TABLET | Refills: 0 | Status: SHIPPED | OUTPATIENT
Start: 2020-02-24 | End: 2020-03-23 | Stop reason: SDUPTHER

## 2020-03-05 ENCOUNTER — OFFICE VISIT (OUTPATIENT)
Dept: FAMILY MEDICINE CLINIC | Facility: CLINIC | Age: 35
End: 2020-03-05

## 2020-03-05 VITALS
BODY MASS INDEX: 45.99 KG/M2 | HEIGHT: 67 IN | DIASTOLIC BLOOD PRESSURE: 80 MMHG | SYSTOLIC BLOOD PRESSURE: 130 MMHG | HEART RATE: 97 BPM | WEIGHT: 293 LBS | RESPIRATION RATE: 18 BRPM | TEMPERATURE: 97 F

## 2020-03-05 DIAGNOSIS — I10 BENIGN ESSENTIAL HTN: ICD-10-CM

## 2020-03-05 DIAGNOSIS — F43.23 ADJUSTMENT DISORDER WITH MIXED ANXIETY AND DEPRESSED MOOD: Primary | ICD-10-CM

## 2020-03-05 PROCEDURE — 99213 OFFICE O/P EST LOW 20 MIN: CPT | Performed by: INTERNAL MEDICINE

## 2020-03-05 PROCEDURE — 3075F SYST BP GE 130 - 139MM HG: CPT | Performed by: INTERNAL MEDICINE

## 2020-03-05 PROCEDURE — 3008F BODY MASS INDEX DOCD: CPT | Performed by: INTERNAL MEDICINE

## 2020-03-05 PROCEDURE — 3079F DIAST BP 80-89 MM HG: CPT | Performed by: INTERNAL MEDICINE

## 2020-03-05 PROCEDURE — T1015 CLINIC SERVICE: HCPCS | Performed by: INTERNAL MEDICINE

## 2020-03-05 RX ORDER — HYDROCHLOROTHIAZIDE 25 MG/1
25 TABLET ORAL DAILY
Qty: 90 TABLET | Refills: 3 | Status: SHIPPED | OUTPATIENT
Start: 2020-03-05 | End: 2021-03-19 | Stop reason: ALTCHOICE

## 2020-03-05 NOTE — PROGRESS NOTES
Assessment/Plan:    Adjustment disorder  Unfortunately due to passing of her mother  Offered to send message to our behavioral psychologist Dr Christopher Parikh to arrange an appointment  Patient declines at this time  Alternative therapies also discussed including: exercise, music, light reading, watching comedies, gratitude list, asking family members for help/support  Reassess at next visit  Benign essential HTN  - Discussed with patient that goal BP <150/90 in the absence of DM or CKD  -controlled:  Continue with amlodipine 10 mg daily, hydrochlorothiazide 25 mg daily and decrease labetalol from 100 mg BID to 100mg QD  - Restrict daily salt intake up to 2 4 g  - Discussed DASH diet, hand out given  - Advised to abstain for alcohol consumption  - Advised to walk 30 minutes a day 5 times a week and practice stress relieving measures such as meditation, yoga, riley chi      -     HEMOGLOBIN A1C W/ EAG ESTIMATION; Future  -     Basic metabolic panel; Future  -     hydrochlorothiazide (HYDRODIURIL) 25 mg tablet; Take 1 tablet (25 mg total) by mouth daily        Subjective:      Patient ID: Christelle Corea is a 27 y o  female  34F with PMh singificant for HTN returns for follow-up     HTN  Currently managed with amlodipine 10 mg daily, hydrochlorothiazide 25 mg daily, labetalol 100 mg b i d  Denies chest pain, difficulty breathing, dyspnea on exertion, dizziness or weakness  States she is very sad because her mother passed away about 2 months ago  Mother was ill and suffered cardiac arrest   Patient to care of mother during her last days  She is appreciative of her time with her mother  Tearful in office  Review of Systems   Constitutional: Negative for chills and fever  Respiratory: Negative for cough and shortness of breath  Cardiovascular: Negative for chest pain and palpitations  Endocrine: Negative for polyphagia and polyuria  Musculoskeletal: Negative for back pain     Neurological: Negative for light-headedness, numbness and headaches  Objective:      /80   Pulse 97   Temp (!) 97 °F (36 1 °C) (Temporal)   Resp 18   Ht 5' 7" (1 702 m)   Wt (!) 142 kg (312 lb)   BMI 48 87 kg/m²          Physical Exam   Constitutional: She appears well-developed and well-nourished  She appears distressed  HENT:   Head: Normocephalic and atraumatic  Eyes: EOM are normal    Neck: Normal range of motion  Neck supple  Cardiovascular: Normal rate and normal heart sounds  Pulmonary/Chest: Effort normal and breath sounds normal  No respiratory distress  Abdominal: Soft  Bowel sounds are normal  She exhibits no distension  Neurological: She is alert  Skin: Skin is warm and dry  Psychiatric: Her mood appears anxious  She exhibits a depressed mood

## 2020-03-23 ENCOUNTER — TELEPHONE (OUTPATIENT)
Dept: FAMILY MEDICINE CLINIC | Facility: CLINIC | Age: 35
End: 2020-03-23

## 2020-03-23 DIAGNOSIS — I10 BENIGN ESSENTIAL HTN: ICD-10-CM

## 2020-03-23 RX ORDER — LABETALOL 100 MG/1
100 TABLET, FILM COATED ORAL DAILY
Qty: 60 TABLET | Refills: 0 | Status: SHIPPED | OUTPATIENT
Start: 2020-03-23 | End: 2020-06-01

## 2020-03-26 ENCOUNTER — TELEPHONE (OUTPATIENT)
Dept: FAMILY MEDICINE CLINIC | Facility: CLINIC | Age: 35
End: 2020-03-26

## 2020-04-06 ENCOUNTER — TELEMEDICINE (OUTPATIENT)
Dept: FAMILY MEDICINE CLINIC | Facility: CLINIC | Age: 35
End: 2020-04-06

## 2020-04-06 DIAGNOSIS — I10 BENIGN ESSENTIAL HTN: Primary | ICD-10-CM

## 2020-04-06 DIAGNOSIS — F32.A DEPRESSIVE DISORDER: ICD-10-CM

## 2020-04-06 PROCEDURE — G2012 BRIEF CHECK IN BY MD/QHP: HCPCS | Performed by: INTERNAL MEDICINE

## 2020-04-06 PROCEDURE — T1015 CLINIC SERVICE: HCPCS | Performed by: INTERNAL MEDICINE

## 2020-04-16 ENCOUNTER — PATIENT OUTREACH (OUTPATIENT)
Dept: FAMILY MEDICINE CLINIC | Facility: CLINIC | Age: 35
End: 2020-04-16

## 2020-04-27 ENCOUNTER — TELEPHONE (OUTPATIENT)
Dept: FAMILY MEDICINE CLINIC | Facility: CLINIC | Age: 35
End: 2020-04-27

## 2020-04-27 DIAGNOSIS — F31.81 BIPOLAR II DISORDER (HCC): Primary | ICD-10-CM

## 2020-04-29 ENCOUNTER — TELEPHONE (OUTPATIENT)
Dept: FAMILY MEDICINE CLINIC | Facility: CLINIC | Age: 35
End: 2020-04-29

## 2020-04-30 ENCOUNTER — TELEMEDICINE (OUTPATIENT)
Dept: FAMILY MEDICINE CLINIC | Facility: CLINIC | Age: 35
End: 2020-04-30

## 2020-04-30 DIAGNOSIS — T30.0 BURN: ICD-10-CM

## 2020-04-30 DIAGNOSIS — F43.23 ADJUSTMENT DISORDER WITH MIXED ANXIETY AND DEPRESSED MOOD: Primary | ICD-10-CM

## 2020-04-30 PROCEDURE — G2012 BRIEF CHECK IN BY MD/QHP: HCPCS | Performed by: FAMILY MEDICINE

## 2020-04-30 PROCEDURE — T1015 CLINIC SERVICE: HCPCS | Performed by: FAMILY MEDICINE

## 2020-04-30 RX ORDER — LORAZEPAM 1 MG/1
1 TABLET ORAL
Qty: 10 TABLET | Refills: 0 | Status: SHIPPED | OUTPATIENT
Start: 2020-04-30 | End: 2020-05-14

## 2020-05-01 ENCOUNTER — PATIENT OUTREACH (OUTPATIENT)
Dept: FAMILY MEDICINE CLINIC | Facility: CLINIC | Age: 35
End: 2020-05-01

## 2020-05-06 ENCOUNTER — TELEPHONE (OUTPATIENT)
Dept: FAMILY MEDICINE CLINIC | Facility: CLINIC | Age: 35
End: 2020-05-06

## 2020-05-07 ENCOUNTER — TELEMEDICINE (OUTPATIENT)
Dept: FAMILY MEDICINE CLINIC | Facility: CLINIC | Age: 35
End: 2020-05-07

## 2020-05-07 ENCOUNTER — TELEPHONE (OUTPATIENT)
Dept: FAMILY MEDICINE CLINIC | Facility: CLINIC | Age: 35
End: 2020-05-07

## 2020-05-07 DIAGNOSIS — F31.81 BIPOLAR II DISORDER (HCC): Primary | ICD-10-CM

## 2020-05-07 PROCEDURE — 99213 OFFICE O/P EST LOW 20 MIN: CPT | Performed by: FAMILY MEDICINE

## 2020-05-07 PROCEDURE — T1015 CLINIC SERVICE: HCPCS | Performed by: FAMILY MEDICINE

## 2020-05-07 RX ORDER — DIVALPROEX SODIUM 500 MG/1
500 TABLET, DELAYED RELEASE ORAL EVERY 12 HOURS SCHEDULED
Qty: 30 TABLET | Refills: 0 | Status: SHIPPED | OUTPATIENT
Start: 2020-05-07 | End: 2020-05-14

## 2020-05-07 RX ORDER — QUETIAPINE FUMARATE 50 MG/1
50 TABLET, FILM COATED ORAL
Qty: 30 TABLET | Refills: 0 | Status: SHIPPED | OUTPATIENT
Start: 2020-05-07 | End: 2020-05-14

## 2020-05-11 ENCOUNTER — TELEMEDICINE (OUTPATIENT)
Dept: FAMILY MEDICINE CLINIC | Facility: CLINIC | Age: 35
End: 2020-05-11

## 2020-05-11 DIAGNOSIS — F43.23 ADJUSTMENT DISORDER WITH MIXED ANXIETY AND DEPRESSED MOOD: ICD-10-CM

## 2020-05-11 DIAGNOSIS — F31.81 BIPOLAR II DISORDER (HCC): Primary | ICD-10-CM

## 2020-05-11 PROCEDURE — G2012 BRIEF CHECK IN BY MD/QHP: HCPCS | Performed by: INTERNAL MEDICINE

## 2020-05-11 PROCEDURE — T1015 CLINIC SERVICE: HCPCS | Performed by: INTERNAL MEDICINE

## 2020-05-13 ENCOUNTER — TELEPHONE (OUTPATIENT)
Dept: FAMILY MEDICINE CLINIC | Facility: CLINIC | Age: 35
End: 2020-05-13

## 2020-05-14 ENCOUNTER — TELEMEDICINE (OUTPATIENT)
Dept: FAMILY MEDICINE CLINIC | Facility: CLINIC | Age: 35
End: 2020-05-14

## 2020-05-14 DIAGNOSIS — F43.23 ADJUSTMENT DISORDER WITH MIXED ANXIETY AND DEPRESSED MOOD: Primary | ICD-10-CM

## 2020-05-14 PROCEDURE — T1015 CLINIC SERVICE: HCPCS | Performed by: FAMILY MEDICINE

## 2020-05-14 PROCEDURE — G2012 BRIEF CHECK IN BY MD/QHP: HCPCS | Performed by: FAMILY MEDICINE

## 2020-05-17 ENCOUNTER — HOSPITAL ENCOUNTER (EMERGENCY)
Facility: HOSPITAL | Age: 35
Discharge: HOME | End: 2020-05-17
Attending: EMERGENCY MEDICINE | Admitting: EMERGENCY MEDICINE
Payer: COMMERCIAL

## 2020-05-17 ENCOUNTER — APPOINTMENT (EMERGENCY)
Dept: RADIOLOGY | Facility: HOSPITAL | Age: 35
End: 2020-05-17
Payer: COMMERCIAL

## 2020-05-17 VITALS
DIASTOLIC BLOOD PRESSURE: 99 MMHG | RESPIRATION RATE: 18 BRPM | HEART RATE: 99 BPM | TEMPERATURE: 99.5 F | SYSTOLIC BLOOD PRESSURE: 186 MMHG | OXYGEN SATURATION: 100 %

## 2020-05-17 DIAGNOSIS — I10 HYPERTENSION: ICD-10-CM

## 2020-05-17 DIAGNOSIS — M79.641 RIGHT HAND PAIN: Primary | ICD-10-CM

## 2020-05-17 DIAGNOSIS — S69.91XA HAND INJURY, RIGHT, INITIAL ENCOUNTER: ICD-10-CM

## 2020-05-17 LAB
EXT PREG TEST URINE: NEGATIVE
EXT. CONTROL ED NAV: NORMAL

## 2020-05-17 PROCEDURE — 73130 X-RAY EXAM OF HAND: CPT

## 2020-05-17 PROCEDURE — 81025 URINE PREGNANCY TEST: CPT | Performed by: PHYSICIAN ASSISTANT

## 2020-05-17 PROCEDURE — 99283 EMERGENCY DEPT VISIT LOW MDM: CPT

## 2020-05-17 PROCEDURE — 99284 EMERGENCY DEPT VISIT MOD MDM: CPT | Performed by: PHYSICIAN ASSISTANT

## 2020-05-17 PROCEDURE — 73140 X-RAY EXAM OF FINGER(S): CPT

## 2020-05-18 ENCOUNTER — TELEPHONE (OUTPATIENT)
Dept: EMERGENCY DEPT | Facility: HOSPITAL | Age: 35
End: 2020-05-18

## 2020-05-18 ENCOUNTER — TELEPHONE (OUTPATIENT)
Dept: FAMILY MEDICINE CLINIC | Facility: CLINIC | Age: 35
End: 2020-05-18

## 2020-05-19 ENCOUNTER — OFFICE VISIT (OUTPATIENT)
Dept: OBGYN CLINIC | Facility: MEDICAL CENTER | Age: 35
End: 2020-05-19
Payer: COMMERCIAL

## 2020-05-19 VITALS
DIASTOLIC BLOOD PRESSURE: 105 MMHG | WEIGHT: 293 LBS | BODY MASS INDEX: 45.99 KG/M2 | SYSTOLIC BLOOD PRESSURE: 162 MMHG | HEIGHT: 67 IN | HEART RATE: 101 BPM | TEMPERATURE: 97.8 F

## 2020-05-19 DIAGNOSIS — S62.304A CLOSED NONDISPLACED FRACTURE OF FOURTH METACARPAL BONE OF RIGHT HAND, UNSPECIFIED PORTION OF METACARPAL, INITIAL ENCOUNTER: Primary | ICD-10-CM

## 2020-05-19 PROCEDURE — 99204 OFFICE O/P NEW MOD 45 MIN: CPT | Performed by: ORTHOPAEDIC SURGERY

## 2020-05-19 PROCEDURE — 3008F BODY MASS INDEX DOCD: CPT | Performed by: ORTHOPAEDIC SURGERY

## 2020-05-19 PROCEDURE — 3080F DIAST BP >= 90 MM HG: CPT | Performed by: ORTHOPAEDIC SURGERY

## 2020-05-19 PROCEDURE — 1036F TOBACCO NON-USER: CPT | Performed by: ORTHOPAEDIC SURGERY

## 2020-05-19 PROCEDURE — 3077F SYST BP >= 140 MM HG: CPT | Performed by: ORTHOPAEDIC SURGERY

## 2020-05-19 PROCEDURE — 26600 TREAT METACARPAL FRACTURE: CPT | Performed by: ORTHOPAEDIC SURGERY

## 2020-05-21 ENCOUNTER — TELEPHONE (OUTPATIENT)
Dept: OBGYN CLINIC | Facility: HOSPITAL | Age: 35
End: 2020-05-21

## 2020-05-21 ENCOUNTER — TELEMEDICINE (OUTPATIENT)
Dept: FAMILY MEDICINE CLINIC | Facility: CLINIC | Age: 35
End: 2020-05-21

## 2020-05-21 DIAGNOSIS — F31.81 BIPOLAR II DISORDER (HCC): Primary | ICD-10-CM

## 2020-05-21 PROCEDURE — G2012 BRIEF CHECK IN BY MD/QHP: HCPCS | Performed by: INTERNAL MEDICINE

## 2020-05-21 PROCEDURE — T1015 CLINIC SERVICE: HCPCS | Performed by: INTERNAL MEDICINE

## 2020-05-29 DIAGNOSIS — I10 BENIGN ESSENTIAL HTN: ICD-10-CM

## 2020-06-01 ENCOUNTER — TELEPHONE (OUTPATIENT)
Dept: FAMILY MEDICINE CLINIC | Facility: CLINIC | Age: 35
End: 2020-06-01

## 2020-06-01 RX ORDER — LABETALOL 100 MG/1
100 TABLET, FILM COATED ORAL DAILY
Qty: 30 TABLET | Refills: 0 | Status: SHIPPED | OUTPATIENT
Start: 2020-06-01 | End: 2020-06-29

## 2020-06-02 ENCOUNTER — PATIENT OUTREACH (OUTPATIENT)
Dept: FAMILY MEDICINE CLINIC | Facility: CLINIC | Age: 35
End: 2020-06-02

## 2020-06-04 ENCOUNTER — TELEMEDICINE (OUTPATIENT)
Dept: FAMILY MEDICINE CLINIC | Facility: CLINIC | Age: 35
End: 2020-06-04

## 2020-06-04 VITALS — DIASTOLIC BLOOD PRESSURE: 70 MMHG | SYSTOLIC BLOOD PRESSURE: 134 MMHG

## 2020-06-04 DIAGNOSIS — J30.1 SEASONAL ALLERGIC RHINITIS DUE TO POLLEN: Primary | ICD-10-CM

## 2020-06-04 PROBLEM — T30.0 BURN: Status: RESOLVED | Noted: 2020-04-30 | Resolved: 2020-06-04

## 2020-06-04 PROCEDURE — G2012 BRIEF CHECK IN BY MD/QHP: HCPCS | Performed by: INTERNAL MEDICINE

## 2020-06-04 RX ORDER — LORATADINE 10 MG/1
10 TABLET ORAL DAILY
Qty: 90 TABLET | Refills: 2 | Status: SHIPPED | OUTPATIENT
Start: 2020-06-04 | End: 2020-08-07 | Stop reason: SDUPTHER

## 2020-06-04 RX ORDER — KETOTIFEN FUMARATE 0.35 MG/ML
1 SOLUTION/ DROPS OPHTHALMIC 2 TIMES DAILY
Qty: 5 ML | Refills: 4 | Status: SHIPPED | OUTPATIENT
Start: 2020-06-04 | End: 2020-11-27 | Stop reason: SDUPTHER

## 2020-06-04 RX ORDER — FLUTICASONE PROPIONATE 50 MCG
1 SPRAY, SUSPENSION (ML) NASAL DAILY
Qty: 1 BOTTLE | Refills: 3 | Status: SHIPPED | OUTPATIENT
Start: 2020-06-04 | End: 2020-08-07 | Stop reason: SDUPTHER

## 2020-06-05 ENCOUNTER — APPOINTMENT (OUTPATIENT)
Dept: LAB | Facility: HOSPITAL | Age: 35
End: 2020-06-05
Payer: COMMERCIAL

## 2020-06-05 DIAGNOSIS — F31.81 BIPOLAR II DISORDER (HCC): ICD-10-CM

## 2020-06-05 DIAGNOSIS — I10 BENIGN ESSENTIAL HTN: ICD-10-CM

## 2020-06-05 LAB
ALBUMIN SERPL BCP-MCNC: 4.6 G/DL (ref 3–5.2)
ALP SERPL-CCNC: 97 U/L (ref 43–122)
ALT SERPL W P-5'-P-CCNC: 8 U/L (ref 9–52)
ANION GAP SERPL CALCULATED.3IONS-SCNC: 13 MMOL/L (ref 5–14)
AST SERPL W P-5'-P-CCNC: 17 U/L (ref 14–36)
BASOPHILS # BLD AUTO: 0.1 THOUSANDS/ΜL (ref 0–0.1)
BASOPHILS NFR BLD AUTO: 1 % (ref 0–1)
BILIRUB SERPL-MCNC: 0.8 MG/DL
BUN SERPL-MCNC: 9 MG/DL (ref 5–25)
CALCIUM SERPL-MCNC: 10 MG/DL (ref 8.4–10.2)
CHLORIDE SERPL-SCNC: 96 MMOL/L (ref 97–108)
CHOLEST SERPL-MCNC: 275 MG/DL
CO2 SERPL-SCNC: 28 MMOL/L (ref 22–30)
CREAT SERPL-MCNC: 1.19 MG/DL (ref 0.6–1.2)
EOSINOPHIL # BLD AUTO: 0.1 THOUSAND/ΜL (ref 0–0.4)
EOSINOPHIL NFR BLD AUTO: 1 % (ref 0–6)
ERYTHROCYTE [DISTWIDTH] IN BLOOD BY AUTOMATED COUNT: 13.6 %
EST. AVERAGE GLUCOSE BLD GHB EST-MCNC: 140 MG/DL
GFR SERPL CREATININE-BSD FRML MDRD: 68 ML/MIN/1.73SQ M
GLUCOSE P FAST SERPL-MCNC: 159 MG/DL (ref 70–99)
HBA1C MFR BLD: 6.5 %
HCT VFR BLD AUTO: 35.4 % (ref 36–46)
HDLC SERPL-MCNC: 30 MG/DL
HGB BLD-MCNC: 11.8 G/DL (ref 12–16)
LDLC SERPL CALC-MCNC: 207 MG/DL
LYMPHOCYTES # BLD AUTO: 3 THOUSANDS/ΜL (ref 0.5–4)
LYMPHOCYTES NFR BLD AUTO: 27 % (ref 25–45)
MCH RBC QN AUTO: 28.1 PG (ref 26–34)
MCHC RBC AUTO-ENTMCNC: 33.5 G/DL (ref 31–36)
MCV RBC AUTO: 84 FL (ref 80–100)
MONOCYTES # BLD AUTO: 0.6 THOUSAND/ΜL (ref 0.2–0.9)
MONOCYTES NFR BLD AUTO: 5 % (ref 1–10)
NEUTROPHILS # BLD AUTO: 7.5 THOUSANDS/ΜL (ref 1.8–7.8)
NEUTS SEG NFR BLD AUTO: 67 % (ref 45–65)
NONHDLC SERPL-MCNC: 245 MG/DL
PLATELET # BLD AUTO: 442 THOUSANDS/UL (ref 150–450)
PMV BLD AUTO: 9.6 FL (ref 8.9–12.7)
POTASSIUM SERPL-SCNC: 3.3 MMOL/L (ref 3.6–5)
PROT SERPL-MCNC: 9.1 G/DL (ref 5.9–8.4)
RBC # BLD AUTO: 4.22 MILLION/UL (ref 4–5.2)
SODIUM SERPL-SCNC: 137 MMOL/L (ref 137–147)
TRIGL SERPL-MCNC: 189 MG/DL
TSH SERPL DL<=0.05 MIU/L-ACNC: 1.59 UIU/ML (ref 0.47–4.68)
WBC # BLD AUTO: 11.2 THOUSAND/UL (ref 4.5–11)

## 2020-06-05 PROCEDURE — 83036 HEMOGLOBIN GLYCOSYLATED A1C: CPT

## 2020-06-05 PROCEDURE — 3044F HG A1C LEVEL LT 7.0%: CPT | Performed by: ORTHOPAEDIC SURGERY

## 2020-06-05 PROCEDURE — 85025 COMPLETE CBC W/AUTO DIFF WBC: CPT

## 2020-06-05 PROCEDURE — 80053 COMPREHEN METABOLIC PANEL: CPT

## 2020-06-05 PROCEDURE — 80061 LIPID PANEL: CPT

## 2020-06-05 PROCEDURE — 84443 ASSAY THYROID STIM HORMONE: CPT

## 2020-06-05 PROCEDURE — 36415 COLL VENOUS BLD VENIPUNCTURE: CPT

## 2020-06-05 PROCEDURE — 3044F HG A1C LEVEL LT 7.0%: CPT | Performed by: INTERNAL MEDICINE

## 2020-06-08 ENCOUNTER — TELEPHONE (OUTPATIENT)
Dept: FAMILY MEDICINE CLINIC | Facility: CLINIC | Age: 35
End: 2020-06-08

## 2020-06-08 ENCOUNTER — TELEMEDICINE (OUTPATIENT)
Dept: FAMILY MEDICINE CLINIC | Facility: CLINIC | Age: 35
End: 2020-06-08

## 2020-06-08 DIAGNOSIS — K21.9 GASTROESOPHAGEAL REFLUX DISEASE WITHOUT ESOPHAGITIS: ICD-10-CM

## 2020-06-08 DIAGNOSIS — E78.2 MIXED HYPERLIPIDEMIA: ICD-10-CM

## 2020-06-08 DIAGNOSIS — E11.9 TYPE 2 DIABETES MELLITUS WITHOUT COMPLICATION, WITHOUT LONG-TERM CURRENT USE OF INSULIN (HCC): Primary | ICD-10-CM

## 2020-06-08 DIAGNOSIS — E87.6 HYPOKALEMIA: ICD-10-CM

## 2020-06-08 PROCEDURE — 99214 OFFICE O/P EST MOD 30 MIN: CPT | Performed by: FAMILY MEDICINE

## 2020-06-08 RX ORDER — ATORVASTATIN CALCIUM 10 MG/1
10 TABLET, FILM COATED ORAL DAILY
Qty: 30 TABLET | Refills: 0 | Status: SHIPPED | OUTPATIENT
Start: 2020-06-08 | End: 2020-08-07 | Stop reason: SDUPTHER

## 2020-06-08 RX ORDER — OMEPRAZOLE 40 MG/1
40 CAPSULE, DELAYED RELEASE ORAL
Qty: 30 CAPSULE | Refills: 0 | Status: SHIPPED | OUTPATIENT
Start: 2020-06-08 | End: 2021-11-11

## 2020-06-09 ENCOUNTER — PATIENT OUTREACH (OUTPATIENT)
Dept: FAMILY MEDICINE CLINIC | Facility: CLINIC | Age: 35
End: 2020-06-09

## 2020-06-15 ENCOUNTER — OFFICE VISIT (OUTPATIENT)
Dept: OBGYN CLINIC | Facility: MEDICAL CENTER | Age: 35
End: 2020-06-15

## 2020-06-15 ENCOUNTER — TELEPHONE (OUTPATIENT)
Dept: FAMILY MEDICINE CLINIC | Facility: CLINIC | Age: 35
End: 2020-06-15

## 2020-06-15 VITALS
HEART RATE: 112 BPM | BODY MASS INDEX: 45.99 KG/M2 | HEIGHT: 67 IN | TEMPERATURE: 99.2 F | DIASTOLIC BLOOD PRESSURE: 80 MMHG | SYSTOLIC BLOOD PRESSURE: 148 MMHG | WEIGHT: 293 LBS

## 2020-06-15 DIAGNOSIS — S62.304D CLOSED NONDISPLACED FRACTURE OF FOURTH METACARPAL BONE OF RIGHT HAND WITH ROUTINE HEALING, UNSPECIFIED PORTION OF METACARPAL, SUBSEQUENT ENCOUNTER: Primary | ICD-10-CM

## 2020-06-15 PROCEDURE — 3079F DIAST BP 80-89 MM HG: CPT | Performed by: ORTHOPAEDIC SURGERY

## 2020-06-15 PROCEDURE — 3008F BODY MASS INDEX DOCD: CPT | Performed by: INTERNAL MEDICINE

## 2020-06-15 PROCEDURE — 3008F BODY MASS INDEX DOCD: CPT | Performed by: ORTHOPAEDIC SURGERY

## 2020-06-15 PROCEDURE — 99024 POSTOP FOLLOW-UP VISIT: CPT | Performed by: ORTHOPAEDIC SURGERY

## 2020-06-15 PROCEDURE — 3077F SYST BP >= 140 MM HG: CPT | Performed by: ORTHOPAEDIC SURGERY

## 2020-06-16 ENCOUNTER — PATIENT OUTREACH (OUTPATIENT)
Dept: FAMILY MEDICINE CLINIC | Facility: CLINIC | Age: 35
End: 2020-06-16

## 2020-06-18 ENCOUNTER — TELEPHONE (OUTPATIENT)
Dept: FAMILY MEDICINE CLINIC | Facility: CLINIC | Age: 35
End: 2020-06-18

## 2020-06-18 PROBLEM — Z72.0 TOBACCO USE: Status: RESOLVED | Noted: 2019-11-04 | Resolved: 2020-06-18

## 2020-06-29 ENCOUNTER — TELEMEDICINE (OUTPATIENT)
Dept: FAMILY MEDICINE CLINIC | Facility: CLINIC | Age: 35
End: 2020-06-29

## 2020-06-29 VITALS — HEART RATE: 88 BPM | SYSTOLIC BLOOD PRESSURE: 130 MMHG | DIASTOLIC BLOOD PRESSURE: 70 MMHG

## 2020-06-29 DIAGNOSIS — I10 BENIGN ESSENTIAL HTN: Primary | ICD-10-CM

## 2020-06-29 PROCEDURE — G2012 BRIEF CHECK IN BY MD/QHP: HCPCS | Performed by: FAMILY MEDICINE

## 2020-07-07 DIAGNOSIS — K21.9 GASTROESOPHAGEAL REFLUX DISEASE WITHOUT ESOPHAGITIS: ICD-10-CM

## 2020-07-07 DIAGNOSIS — E78.2 MIXED HYPERLIPIDEMIA: ICD-10-CM

## 2020-07-30 DIAGNOSIS — E78.2 MIXED HYPERLIPIDEMIA: ICD-10-CM

## 2020-07-31 RX ORDER — ATORVASTATIN CALCIUM 10 MG/1
10 TABLET, FILM COATED ORAL DAILY
Qty: 30 TABLET | Refills: 0 | OUTPATIENT
Start: 2020-07-31

## 2020-07-31 NOTE — TELEPHONE ENCOUNTER
Patient is only 27years old  Need to be seen and reevaluated for lipitor  Please let her know   Thank you

## 2020-08-05 RX ORDER — LABETALOL 100 MG/1
TABLET, FILM COATED ORAL
Qty: 30 TABLET | Refills: 0 | OUTPATIENT
Start: 2020-08-05

## 2020-08-05 RX ORDER — OMEPRAZOLE 40 MG/1
CAPSULE, DELAYED RELEASE ORAL
Qty: 30 CAPSULE | Refills: 0 | OUTPATIENT
Start: 2020-08-05

## 2020-08-05 RX ORDER — ATORVASTATIN CALCIUM 10 MG/1
TABLET, FILM COATED ORAL
Qty: 30 TABLET | Refills: 0 | OUTPATIENT
Start: 2020-08-05

## 2020-08-05 NOTE — TELEPHONE ENCOUNTER
Please have patient come in and follow-up with the next available provider to re-evaluate if patient needs to be on these medications

## 2020-08-07 ENCOUNTER — TELEMEDICINE (OUTPATIENT)
Dept: FAMILY MEDICINE CLINIC | Facility: CLINIC | Age: 35
End: 2020-08-07

## 2020-08-07 DIAGNOSIS — E11.9 TYPE 2 DIABETES MELLITUS WITHOUT COMPLICATION, WITHOUT LONG-TERM CURRENT USE OF INSULIN (HCC): ICD-10-CM

## 2020-08-07 DIAGNOSIS — I10 BENIGN ESSENTIAL HTN: ICD-10-CM

## 2020-08-07 DIAGNOSIS — E78.2 MIXED HYPERLIPIDEMIA: Primary | ICD-10-CM

## 2020-08-07 DIAGNOSIS — F41.9 ANXIETY: ICD-10-CM

## 2020-08-07 DIAGNOSIS — J30.1 SEASONAL ALLERGIC RHINITIS DUE TO POLLEN: ICD-10-CM

## 2020-08-07 PROCEDURE — G2012 BRIEF CHECK IN BY MD/QHP: HCPCS | Performed by: FAMILY MEDICINE

## 2020-08-07 RX ORDER — FLUTICASONE PROPIONATE 50 MCG
1 SPRAY, SUSPENSION (ML) NASAL DAILY
Qty: 1 BOTTLE | Refills: 1 | Status: SHIPPED | OUTPATIENT
Start: 2020-08-07 | End: 2020-11-27 | Stop reason: SDUPTHER

## 2020-08-07 RX ORDER — LORATADINE 10 MG/1
10 TABLET ORAL DAILY
Qty: 90 TABLET | Refills: 0 | Status: SHIPPED | OUTPATIENT
Start: 2020-08-07 | End: 2020-11-27

## 2020-08-07 RX ORDER — ATORVASTATIN CALCIUM 10 MG/1
10 TABLET, FILM COATED ORAL DAILY
Qty: 30 TABLET | Refills: 0 | Status: SHIPPED | OUTPATIENT
Start: 2020-08-07 | End: 2020-09-24

## 2020-08-07 NOTE — ASSESSMENT & PLAN NOTE
Lab Results   Component Value Date    HGBA1C 6 5 (H) 06/05/2020     -prior Hx of metformin use during pregnancy, discontinued afterwards   -currently managed with diet modifications  Patient indicates to have been more adherent to her diet and decreasing carbohydrates intake  Patient congratulated and encouraged to continue with adherence   -Will repeat HbA1c for next visit to assess after lifestyle modifications and consider further adjustment in treatment as needed     -urine microalbumin also ordered for assessment given hx of DM

## 2020-08-07 NOTE — PROGRESS NOTES
Virtual Brief Visit    Assessment/Plan:    Problem List Items Addressed This Visit        Endocrine    Type 2 diabetes mellitus without complication, without long-term current use of insulin ([facility] [address] )       Lab Results   Component Value Date    HGBA1C 6 5 (H) 06/05/2020     -prior Hx of metformin use during pregnancy, discontinued afterwards   -currently managed with diet modifications  Patient indicates to have been more adherent to her diet and decreasing carbohydrates intake  Patient congratulated and encouraged to continue with adherence   -Will repeat HbA1c for next visit to assess after lifestyle modifications and consider further adjustment in treatment as needed  -urine microalbumin also ordered for assessment given hx of DM          Relevant Orders    HEMOGLOBIN A1C W/ EAG ESTIMATION    Microalbumin / creatinine urine ratio       Respiratory    Seasonal allergic rhinitis due to pollen     -recent exacerbation of nasal allergies  Has been using air conditioning and fan more frequent and directly into face which provoked nasal congestion and eye pressure   -Will provide refills for Claritin to use daily and Flonase as needed for symptoms relief    -encouraged to avoid direct use of fan and also avoidance of allergens  Relevant Medications    loratadine (CLARITIN) 10 mg tablet    fluticasone (FLONASE) 50 mcg/act nasal spray       Cardiovascular and Mediastinum    Benign essential HTN     -longstanding Hx of HTN, currently on HCTZ 25 mg daily and amlodipine 10 mg daily    -most recent BP reading recording at home from last week: 150/74  Expresses adherence to medication, No further recordings provided   -Discussed with patient desired BP goal of <140/90 as well as adherence to dietary modifications in low salt diet    -Also encouraged to continue BP readings log and to bring to next visit to assess if medication adjustments are required               Other    Mixed hyperlipidemia - Primary -currently on atorvastatin 10 mg daily  She has not been taking the medication for the past 2 weeks since ran out of refills, has been doing dietary modifications at home   -will refill medication and repeat lipid panel prior to next visit to assess efficacy of medication and dietary modifications and will then re-evaluate and consider further recommendations in regards to treatment  Relevant Medications    atorvastatin (LIPITOR) 10 mg tablet    Other Relevant Orders    Lipid Panel with Direct LDL reflex    Anxiety     -Hx of anxiety with PTSD  Currently following with therapist at [facility]    -She states that therapies have been helpful so far, Not currently interested in medication treatment at the moment  No recent stressors identified at home in today's visit  -Encouraged patient to continue adherence to therapy and will continue to follow up during next visit  Reason for visit is   Chief Complaint   Patient presents with    Virtual Brief Visit        Encounter provider Veena Monzon MD    Provider located at [address] [place] [id number] [phone]    Recent Visits  No visits were found meeting these conditions  Showing recent visits within past 7 days and meeting all other requirements     Today's Visits  Date Type Provider Dept   08/07/20 Telemedicine Veena Monzon MD  [facility]   Showing today's visits and meeting all other requirements     Future Appointments  No visits were found meeting these conditions  Showing future appointments within next 150 days and meeting all other requirements        After connecting through telephone, the patient was identified by name and date of birth  Ebonie Guillen was informed that this is a telemedicine visit and that the visit is being conducted through telephone  My office door was closed  No one else was in the room    She acknowledged consent and understanding of privacy and security of the platform  The patient has agreed to participate and understands she can discontinue the visit at any time  Patient is aware this is a billable service  It was my intent to perform this visit via video technology but the patient was not able to do a video connection so the visit was completed via audio telephone only  Subjective    Du Jovel is a [place] y o  female   Case of [place] y/o female who was called via Televisit for follow up on chronic conditions of: HLD, HTN, DM, Anxiety/PTSD  Patient states that she has been following with therapist at [facility] and so far it has been going good  Treatment mainly is by therapy, she does not want to try medications at the moment  Does not identify recent stressors at home, currently living with her children  She has been compliant with her BP medications HCTZ and amlodipine, most recent BP reading at home from last week was 150/74; she does not have any more recordings of BP since she admits to have been 'slacking' on recording them  Has been trying to keep modifying her diet since her last appointment, has cut down on fried foods and increasing side dishes with pasta, veggies  She eats also rice, spaghetti and lasagna  Drinks water, Gatorade and Pepsi which she expresses 'need to drink it at least once a day'  AS for DM, she has also been cutting down on carbohydrates as well, drinks regular juice but has been gradually adjusting her overall diet  As for the cholesterol, she agreed in previous visit to start atorvastatin medication given lipid panel results, has not been taking her atorvastatin for at least 2 weeks since she ran out of medication and does not have additional refills  Denies having had any symptoms when taking the medication and tolerated it well   She also currently have had a flare up of nasal allergies associated with eye pressure, attributes to have been using the fan and air conditioning directly in the face  Past Medical History:   Diagnosis Date    Asthma     Hypertension     Pseudotumor cerebri        Past Surgical History:   Procedure Laterality Date    TONSILLECTOMY         Current Outpatient Medications   Medication Sig Dispense Refill    acetaminophen (TYLENOL) 500 mg tablet Take 1 tablet (500 mg total) by mouth every 6 (six) hours as needed for mild pain 30 tablet 0    albuterol (PROVENTIL HFA,VENTOLIN HFA) 90 mcg/act inhaler Inhale 2 puffs every 6 (six) hours as needed for wheezing or shortness of breath 2 Inhaler 2    amLODIPine (NORVASC) 10 mg tablet Take 1 tablet (10 mg total) by mouth daily 90 tablet 3    atorvastatin (LIPITOR) 10 mg tablet Take 1 tablet (10 mg total) by mouth daily 30 tablet 0    Blood Pressure KIT by Does not apply route daily 1 each 0    fluticasone (FLONASE) 50 mcg/act nasal spray 1 spray into each nostril daily 1 Bottle 1    hydrochlorothiazide (HYDRODIURIL) 25 mg tablet Take 1 tablet (25 mg total) by mouth daily 90 tablet 3    ketotifen (ZADITOR) 0 025 % ophthalmic solution Administer 1 drop to both eyes 2 (two) times a day 5 mL 4    loratadine (CLARITIN) 10 mg tablet Take 1 tablet (10 mg total) by mouth daily 90 tablet 0    omeprazole (PriLOSEC) 40 MG capsule Take 1 capsule (40 mg total) by mouth daily before breakfast 30 capsule 0    silver sulfadiazine (SILVADENE,SSD) 1 % cream Apply topically daily 50 g 0     No current facility-administered medications for this visit  Allergies   Allergen Reactions    Bee Venom Anaphylaxis     Anaphylaxis    Nabumetone Anaphylaxis     Anaphylaxis    Orange Syrup Anaphylaxis    Prochlorperazine Anaphylaxis     Anaphylaxis    Chlorpheniramine     Codeine     Diphenhydramine      Other reaction(s): head and neck swelling    Pseudoephedrine     Tetanus Toxoid        Review of Systems   Constitutional: Negative for fever  Eyes: Negative for visual disturbance     Respiratory: Negative for cough and wheezing  Cardiovascular: Negative for chest pain  Gastrointestinal: Negative for abdominal pain  Psychiatric/Behavioral: Negative for agitation  The patient is not nervous/anxious  There were no vitals filed for this visit  I spent 20 minutes directly with the patient during this visit    VIRTUAL VISIT DISCLAIMER    Wale Hines acknowledges that she has consented to an online visit or consultation  She understands that the online visit is based solely on information provided by her, and that, in the absence of a face-to-face physical evaluation by the physician, the diagnosis she receives is both limited and provisional in terms of accuracy and completeness  This is not intended to replace a full medical face-to-face evaluation by the physician  Yadiclemencia Flora understands and accepts these terms

## 2020-08-07 NOTE — ASSESSMENT & PLAN NOTE
-recent exacerbation of nasal allergies  Has been using air conditioning and fan more frequent and directly into face which provoked nasal congestion and eye pressure   -Will provide refills for Claritin to use daily and Flonase as needed for symptoms relief    -encouraged to avoid direct use of fan and also avoidance of allergens

## 2020-08-07 NOTE — ASSESSMENT & PLAN NOTE
-currently on atorvastatin 10 mg daily  She has not been taking the medication for the past 2 weeks since ran out of refills, has been doing dietary modifications at home   -will refill medication and repeat lipid panel prior to next visit to assess efficacy of medication and dietary modifications and will then re-evaluate and consider further recommendations in regards to treatment

## 2020-08-07 NOTE — ASSESSMENT & PLAN NOTE
-Hx of anxiety with PTSD  Currently following with therapist at [facility]    -She states that therapies have been helpful so far, Not currently interested in medication treatment at the moment  No recent stressors identified at home in today's visit  -Encouraged patient to continue adherence to therapy and will continue to follow up during next visit

## 2020-08-07 NOTE — ASSESSMENT & PLAN NOTE
-longstanding Hx of HTN, currently on HCTZ 25 mg daily and amlodipine 10 mg daily    -most recent BP reading recording at home from last week: 150/74  Expresses adherence to medication, No further recordings provided   -Discussed with patient desired BP goal of <140/90 as well as adherence to dietary modifications in low salt diet    -Also encouraged to continue BP readings log and to bring to next visit to assess if medication adjustments are required

## 2020-08-10 ENCOUNTER — TELEPHONE (OUTPATIENT)
Dept: FAMILY MEDICINE CLINIC | Facility: CLINIC | Age: 35
End: 2020-08-10

## 2020-08-10 ENCOUNTER — TELEMEDICINE (OUTPATIENT)
Dept: FAMILY MEDICINE CLINIC | Facility: CLINIC | Age: 35
End: 2020-08-10

## 2020-08-10 DIAGNOSIS — T21.11XA: Primary | ICD-10-CM

## 2020-08-10 PROCEDURE — 99213 OFFICE O/P EST LOW 20 MIN: CPT | Performed by: FAMILY MEDICINE

## 2020-08-10 NOTE — TELEPHONE ENCOUNTER
----- Message from [employer] sent at 8/7/2020 11:12 AM EDT -----  Regarding: SCHEDULE APPT  Please schedule appt in 3 months f/u dm htn

## 2020-08-10 NOTE — ASSESSMENT & PLAN NOTE
- No intervention at this time, patient can continue to wash with warm soapy water and pat dry, she may apply neosporin to the area if she wishes however it is not necessary    - Patient advised to come to the office for further evaluation should the area begin to drain purulent fluid or should she develop fever or chills

## 2020-08-10 NOTE — PROGRESS NOTES
Virtual Regular Visit      Assessment/Plan:    Problem List Items Addressed This Visit        Musculoskeletal and Integument    First degree burn of right breast - Primary     - No intervention at this time, patient can continue to wash with warm soapy water and pat dry, she may apply neosporin to the area if she wishes however it is not necessary    - Patient advised to come to the office for further evaluation should the area begin to drain purulent fluid or should she develop fever or chills  Reason for visit is   Chief Complaint   Patient presents with    Virtual Regular Visit        Encounter provider Rachna Abreu MD    Provider located at [address] [place] [id number] [phone]      Recent Visits  Date Type Provider Dept   08/07/20 Telemedicine Wilfrid Padron MD  [facility]   Showing recent visits within past 7 days and meeting all other requirements     Today's Visits  Date Type Provider Dept   08/10/20 Telemedicine MD Chayito Fernandez [facility]   08/10/20 Telephone Isatu Murray  [facility]   Showing today's visits and meeting all other requirements     Future Appointments  No visits were found meeting these conditions  Showing future appointments within next 150 days and meeting all other requirements        The patient was identified by name and date of birth  Kevin Padron was informed that this is a telemedicine visit and that the visit is being conducted through '[employer]' and patient was informed that this is not a secure, HIPAA-complaint platform  She agrees to proceed  My office door was closed  No one else was in the room  She acknowledged consent and understanding of privacy and security of the video platform  The patient has agreed to participate and understands they can discontinue the visit at any time  Patient is aware this is a billable service       [facility] Ema Allen is a pleasant 38 y o  female with a past medical history of obesity, hypertension and type 2 diabetes mellitus who presents today for a same day visit  Yesterday patient was cooking and sustained a burn to her right breast when she bent down and accidentally brushed the oven  At the time patient experienced a sharp pain and since that time patient has been applying water and soapy water to the area  Patient states that following the incident the top layer of the skin peeled off but now it appears to be healing well and has formed a scab  She denies any fever, chills or purulence from the site although states that it sometimes itches       Past Medical History:   Diagnosis Date    Asthma     Hypertension     Pseudotumor cerebri        Past Surgical History:   Procedure Laterality Date    TONSILLECTOMY         Current Outpatient Medications   Medication Sig Dispense Refill    acetaminophen (TYLENOL) 500 mg tablet Take 1 tablet (500 mg total) by mouth every 6 (six) hours as needed for mild pain 30 tablet 0    albuterol (PROVENTIL HFA,VENTOLIN HFA) 90 mcg/act inhaler Inhale 2 puffs every 6 (six) hours as needed for wheezing or shortness of breath 2 Inhaler 2    amLODIPine (NORVASC) 10 mg tablet Take 1 tablet (10 mg total) by mouth daily 90 tablet 3    atorvastatin (LIPITOR) 10 mg tablet Take 1 tablet (10 mg total) by mouth daily 30 tablet 0    Blood Pressure KIT by Does not apply route daily 1 each 0    fluticasone (FLONASE) 50 mcg/act nasal spray 1 spray into each nostril daily 1 Bottle 1    hydrochlorothiazide (HYDRODIURIL) 25 mg tablet Take 1 tablet (25 mg total) by mouth daily 90 tablet 3    ketotifen (ZADITOR) 0 025 % ophthalmic solution Administer 1 drop to both eyes 2 (two) times a day 5 mL 4    loratadine (CLARITIN) 10 mg tablet Take 1 tablet (10 mg total) by mouth daily 90 tablet 0    omeprazole (PriLOSEC) 40 MG capsule Take 1 capsule (40 mg total) by mouth daily before breakfast 30 capsule 0    silver sulfadiazine (SILVADENE,SSD) 1 % cream Apply topically daily 50 g 0     No current facility-administered medications for this visit  Allergies   Allergen Reactions    Bee Venom Anaphylaxis     Anaphylaxis    Nabumetone Anaphylaxis     Anaphylaxis    Orange Syrup Anaphylaxis    Prochlorperazine Anaphylaxis     Anaphylaxis    Chlorpheniramine     Codeine     Diphenhydramine      Other reaction(s): head and neck swelling    Pseudoephedrine     Tetanus Toxoid        Review of Systems   Constitutional: Negative  HENT: Negative  Eyes: Negative  Respiratory: Negative  Cardiovascular: Negative  Gastrointestinal: Negative  Genitourinary: Negative  Musculoskeletal: Negative  Skin:        Burn injury    Neurological: Negative  Psychiatric/Behavioral: Negative  Video Exam    There were no vitals filed for this visit  Physical Exam  Constitutional:       General: She is not in acute distress  Appearance: Normal appearance  She is obese  She is not ill-appearing  HENT:      Head: Normocephalic and atraumatic  Nose: Nose normal    Eyes:      General:         Right eye: No discharge  Left eye: No discharge  Extraocular Movements: Extraocular movements intact  Pulmonary:      Effort: Pulmonary effort is normal  No respiratory distress  Skin:     Comments: Superficial linear wound noted on the right breast with scab formation; no drainage or signs of infection noted  Neurological:      Mental Status: She is alert and oriented to person, place, and time  Psychiatric:         Mood and Affect: Mood normal          Behavior: Behavior normal           I spent 15 minutes directly with the patient during this visit      VIRTUAL VISIT DISCLAIMER    Khris Rm acknowledges that she has consented to an online visit or consultation   She understands that the online visit is based solely on information provided by her, and that, in the absence of a face-to-face physical evaluation by the physician, the diagnosis she receives is both limited and provisional in terms of accuracy and completeness  This is not intended to replace a full medical face-to-face evaluation by the physician  Wale Hines understands and accepts these terms

## 2020-08-11 ENCOUNTER — TELEMEDICINE (OUTPATIENT)
Dept: FAMILY MEDICINE CLINIC | Facility: CLINIC | Age: 35
End: 2020-08-11

## 2020-08-11 DIAGNOSIS — T21.21XD: Primary | ICD-10-CM

## 2020-08-11 PROBLEM — T21.21XA: Status: ACTIVE | Noted: 2020-08-10

## 2020-08-11 PROCEDURE — 3078F DIAST BP <80 MM HG: CPT | Performed by: INTERNAL MEDICINE

## 2020-08-11 PROCEDURE — 99213 OFFICE O/P EST LOW 20 MIN: CPT | Performed by: INTERNAL MEDICINE

## 2020-08-11 PROCEDURE — 3075F SYST BP GE 130 - 139MM HG: CPT | Performed by: INTERNAL MEDICINE

## 2020-08-11 PROCEDURE — 3044F HG A1C LEVEL LT 7.0%: CPT | Performed by: INTERNAL MEDICINE

## 2020-08-11 PROCEDURE — 1036F TOBACCO NON-USER: CPT | Performed by: INTERNAL MEDICINE

## 2020-08-11 RX ORDER — ASCORBATE CALCIUM 500 MG
50 TABLET ORAL DAILY
Qty: 30 TABLET | Refills: 0 | Status: SHIPPED | OUTPATIENT
Start: 2020-08-11

## 2020-08-11 NOTE — PROGRESS NOTES
Virtual Regular Visit      Assessment/Plan:    Problem List Items Addressed This Visit        Musculoskeletal and Integument    Second degree burn of right breast - Primary     Linear, proximally 4 cm long and 3 mm wide with minimal erythema surrounding and no drainage  No systemic signs of infection  - start Silvadene cream topically  - vitamin-D tabs or aloe vera gel or cream after healing begins  - keep wound clean in setting of type 2 diabetes  - if erythema is worsened, drainage develops or wound becomes raised and more painful, call office  - 1 week video follow-up  Relevant Medications    silver sulfadiazine (SILVADENE,SSD) 1 % cream    Vitamin E 100 units TABS               Reason for visit is   Chief Complaint   Patient presents with    Virtual Regular Visit        Encounter provider Bronwyn Mejia MD    Provider located at [address] [place] [id number] [phone]      Recent Visits  Date Type Provider Dept   08/10/20 MD Chayito Saldana [facility]   08/10/20 Telephone Isatu Murray  [facility]   08/07/20 Telemedicine Vanda Vang MD  [facility]   Showing recent visits within past 7 days and meeting all other requirements     Today's Visits  Date Type Provider Dept   08/11/20 Telemedicine MD Chayito Machado [facility]   Showing today's visits and meeting all other requirements     Future Appointments  No visits were found meeting these conditions  Showing future appointments within next 150 days and meeting all other requirements        The patient was identified by name and date of birth  Lashell Montanez was informed that this is a telemedicine visit and that the visit is being conducted through [facility] and patient was informed that this is a secure, HIPAA-compliant platform  She agrees to proceed     My office door was closed  Dr Javier Winslow was present for the examination    She acknowledged consent and understanding of privacy and security of the video platform  The patient has agreed to participate and understands they can discontinue the visit at any time  Patient is aware this is a billable service  Subjective  Ebonie Guillen is a 38 y o  female   Patient is a very pleasant 79-year-old female who presents for tele visit for right breast burn  She was seen by other provider yesterday for the same complaint and has not been using any medications or creams for treatment  She states that wound slightly worse now and at times does drain clear fluid  She admits to slight erythema around the wound, but denies any pain or swelling  She would like a 2nd opinion regarding treatment of the burn  She denies fever, headache, chest pain, shortness of breath, abdominal pain or changes in bladder or bowel habits         Past Medical History:   Diagnosis Date    Asthma     Hypertension     Pseudotumor cerebri        Past Surgical History:   Procedure Laterality Date    TONSILLECTOMY         Current Outpatient Medications   Medication Sig Dispense Refill    acetaminophen (TYLENOL) 500 mg tablet Take 1 tablet (500 mg total) by mouth every 6 (six) hours as needed for mild pain 30 tablet 0    albuterol (PROVENTIL HFA,VENTOLIN HFA) 90 mcg/act inhaler Inhale 2 puffs every 6 (six) hours as needed for wheezing or shortness of breath 2 Inhaler 2    amLODIPine (NORVASC) 10 mg tablet Take 1 tablet (10 mg total) by mouth daily 90 tablet 3    atorvastatin (LIPITOR) 10 mg tablet Take 1 tablet (10 mg total) by mouth daily 30 tablet 0    Blood Pressure KIT by Does not apply route daily 1 each 0    fluticasone (FLONASE) 50 mcg/act nasal spray 1 spray into each nostril daily 1 Bottle 1    hydrochlorothiazide (HYDRODIURIL) 25 mg tablet Take 1 tablet (25 mg total) by mouth daily 90 tablet 3    ketotifen (ZADITOR) 0 025 % ophthalmic solution Administer 1 drop to both eyes 2 (two) times a day 5 mL 4    loratadine (CLARITIN) 10 mg tablet Take 1 tablet (10 mg total) by mouth daily 90 tablet 0    omeprazole (PriLOSEC) 40 MG capsule Take 1 capsule (40 mg total) by mouth daily before breakfast 30 capsule 0    silver sulfadiazine (SILVADENE,SSD) 1 % cream Apply topically daily 50 g 0    Vitamin E 100 units TABS Take 0 5 tablets (50 Units total) by mouth daily 30 tablet 0     No current facility-administered medications for this visit  Allergies   Allergen Reactions    Bee Venom Anaphylaxis     Anaphylaxis    Nabumetone Anaphylaxis     Anaphylaxis    Orange Syrup Anaphylaxis    Prochlorperazine Anaphylaxis     Anaphylaxis    Chlorpheniramine     Codeine     Diphenhydramine      Other reaction(s): head and neck swelling    Pseudoephedrine     Tetanus Toxoid        Review of Systems   Constitutional: Negative for fever  HENT: Negative for congestion, rhinorrhea and sore throat  Respiratory: Negative for cough and shortness of breath  Cardiovascular: Negative for chest pain  Gastrointestinal: Negative for abdominal distention, abdominal pain, constipation, diarrhea, nausea and vomiting  Skin: Positive for wound  Neurological: Negative for headaches  Video Exam    There were no vitals filed for this visit  Physical Exam  Exam conducted with a chaperone present  Constitutional:       General: She is not in acute distress  Appearance: Normal appearance  She is well-developed  She is obese  She is not ill-appearing, toxic-appearing or diaphoretic  HENT:      Head: Normocephalic and atraumatic  Nose: Nose normal  No rhinorrhea  Eyes:      General: No scleral icterus  Right eye: No discharge  Left eye: No discharge  Extraocular Movements: Extraocular movements intact  Conjunctiva/sclera: Conjunctivae normal    Neck:      Thyroid: No thyromegaly  Pulmonary:      Effort: Pulmonary effort is normal  No respiratory distress     Skin:     General: Skin is warm  Findings: Erythema present  No rash  Comments: 4 cm x 3 mm linear burn affecting epidermis and the dermis consistent with second-degree, partial-thickness burn  Mild surrounding erythema  No drainage noted  Wound is drying clean  No edema appreciated  No tenderness to palpation as per patient  Neurological:      Mental Status: She is alert and oriented to person, place, and time  Psychiatric:         Mood and Affect: Mood normal           I spent 20 minutes directly with the patient during this visit      VIRTUAL VISIT DISCLAIMER    Lindsey Mojica acknowledges that she has consented to an online visit or consultation  She understands that the online visit is based solely on information provided by her, and that, in the absence of a face-to-face physical evaluation by the physician, the diagnosis she receives is both limited and provisional in terms of accuracy and completeness  This is not intended to replace a full medical face-to-face evaluation by the physician  Lindsey Mojica understands and accepts these terms

## 2020-08-18 ENCOUNTER — TELEMEDICINE (OUTPATIENT)
Dept: FAMILY MEDICINE CLINIC | Facility: CLINIC | Age: 35
End: 2020-08-18

## 2020-08-18 DIAGNOSIS — T21.21XD: Primary | ICD-10-CM

## 2020-08-18 DIAGNOSIS — F41.9 ANXIETY: ICD-10-CM

## 2020-08-18 PROCEDURE — 3075F SYST BP GE 130 - 139MM HG: CPT | Performed by: INTERNAL MEDICINE

## 2020-08-18 PROCEDURE — 1036F TOBACCO NON-USER: CPT | Performed by: INTERNAL MEDICINE

## 2020-08-18 PROCEDURE — 3078F DIAST BP <80 MM HG: CPT | Performed by: INTERNAL MEDICINE

## 2020-08-18 PROCEDURE — 3044F HG A1C LEVEL LT 7.0%: CPT | Performed by: INTERNAL MEDICINE

## 2020-08-18 PROCEDURE — 99213 OFFICE O/P EST LOW 20 MIN: CPT | Performed by: INTERNAL MEDICINE

## 2020-08-19 NOTE — ASSESSMENT & PLAN NOTE
Linear, approximately 4 cm long and 3 mm wide now with crust over  No surrounding erythema or drainage  Nontender to palpation  Healing well as patient has been using Silvadene cream daily  She will begin using aloe vera cream to decrease scarring

## 2020-08-19 NOTE — PROGRESS NOTES
Virtual Regular Visit      Assessment/Plan:    Problem List Items Addressed This Visit        Musculoskeletal and Integument    Second degree burn of right breast - Primary     Linear, approximately 4 cm long and 3 mm wide now with crust over  No surrounding erythema or drainage  Nontender to palpation  Healing well as patient has been using Silvadene cream daily  She will begin using aloe vera cream to decrease scarring  Other    Anxiety     One episode of panic attack secondary to PTSD and anxiety after the loss of her mother and sister  She did reach out to therapist to helped  - we reviewed deep breathing exercises to relieve some of the symptoms  - continue therapy as a has been helping  Reason for visit is   Chief Complaint   Patient presents with    Virtual Regular Visit        Encounter provider Madison Bain MD    Provider located at [address] [place] [address]  [address] [place] [id number] [phone]      Recent Visits  Date Type Provider Dept   08/11/20 Telemedicine Madison Bain MD  [facility]   Showing recent visits within past 7 days and meeting all other requirements     Today's Visits  Date Type Provider Dept   08/18/20 Telemedicine Madison Bain MD  [facility]   Showing today's visits and meeting all other requirements     Future Appointments  No visits were found meeting these conditions  Showing future appointments within next 150 days and meeting all other requirements        The patient was identified by name and date of birth  Edilia Quinones was informed that this is a telemedicine visit and that the visit is being conducted through [facility] and patient was informed that this is a secure, HIPAA-compliant platform  She agrees to proceed     My office door was closed   The following individuals were in the room with me and the patient informed Dr Fareed Escalante MD   She acknowledged consent and understanding of privacy and security of the video platform  The patient has agreed to participate and understands they can discontinue the visit at any time  Patient is aware this is a billable service  Subjective  Clarissa Nolasco is a 38 y o  female   Patient is a very pleasant 77-year-old female who presents for video visit for follow-up of right breast burn  She states that the lesion has been healing well  Initially there was redness surrounding the second-degree burn but now she states that it has became less red and the lesion has now scabbed over  She did notice a slight amount of serosanguineous fluid expelled from the lesion that has been improving  She denies any pain to palpation, edema, or purulence drainage  Patient has PMH of PTSD and anxiety after loss of her mother and sister this year  She states that she has been doing well and following with therapy acute species  This past week she did have a panic attack and was thinking about the loss of her family members  She did call the therapist and found that to be helpful in relieving of symptoms         Past Medical History:   Diagnosis Date    Asthma     Hypertension     Pseudotumor cerebri        Past Surgical History:   Procedure Laterality Date    TONSILLECTOMY         Current Outpatient Medications   Medication Sig Dispense Refill    acetaminophen (TYLENOL) 500 mg tablet Take 1 tablet (500 mg total) by mouth every 6 (six) hours as needed for mild pain 30 tablet 0    albuterol (PROVENTIL HFA,VENTOLIN HFA) 90 mcg/act inhaler Inhale 2 puffs every 6 (six) hours as needed for wheezing or shortness of breath 2 Inhaler 2    amLODIPine (NORVASC) 10 mg tablet Take 1 tablet (10 mg total) by mouth daily 90 tablet 3    atorvastatin (LIPITOR) 10 mg tablet Take 1 tablet (10 mg total) by mouth daily 30 tablet 0    Blood Pressure KIT by Does not apply route daily 1 each 0    fluticasone (FLONASE) 50 mcg/act nasal spray 1 spray into each nostril daily 1 Bottle 1    hydrochlorothiazide (HYDRODIURIL) 25 mg tablet Take 1 tablet (25 mg total) by mouth daily 90 tablet 3    ketotifen (ZADITOR) 0 025 % ophthalmic solution Administer 1 drop to both eyes 2 (two) times a day 5 mL 4    loratadine (CLARITIN) 10 mg tablet Take 1 tablet (10 mg total) by mouth daily 90 tablet 0    omeprazole (PriLOSEC) 40 MG capsule Take 1 capsule (40 mg total) by mouth daily before breakfast 30 capsule 0    silver sulfadiazine (SILVADENE,SSD) 1 % cream Apply topically daily 50 g 0    Vitamin E 100 units TABS Take 0 5 tablets (50 Units total) by mouth daily 30 tablet 0     No current facility-administered medications for this visit  Allergies   Allergen Reactions    Bee Venom Anaphylaxis     Anaphylaxis    Nabumetone Anaphylaxis     Anaphylaxis    Orange Syrup Anaphylaxis    Prochlorperazine Anaphylaxis     Anaphylaxis    Chlorpheniramine     Codeine     Diphenhydramine      Other reaction(s): head and neck swelling    Pseudoephedrine     Tetanus Toxoid        Review of Systems   Constitutional: Negative for activity change, appetite change and fever  HENT: Negative for congestion and sore throat  Eyes: Negative for visual disturbance  Respiratory: Negative for cough, shortness of breath and wheezing  Cardiovascular: Negative for chest pain and palpitations  Gastrointestinal: Negative for abdominal distention, abdominal pain, constipation, diarrhea and vomiting  Musculoskeletal: Negative for arthralgias  Skin: Positive for color change and wound  Negative for rash  Neurological: Negative for headaches  Psychiatric/Behavioral: The patient is nervous/anxious  Video Exam    There were no vitals filed for this visit  Physical Exam  Exam conducted with a chaperone present  Constitutional:       General: She is not in acute distress  Appearance: She is obese  She is not ill-appearing, toxic-appearing or diaphoretic     HENT:      Head: Normocephalic and atraumatic  Nose: No rhinorrhea  Eyes:      General: No scleral icterus  Right eye: No discharge  Left eye: No discharge  Extraocular Movements: Extraocular movements intact  Pulmonary:      Effort: Pulmonary effort is normal  No respiratory distress  Musculoskeletal:         General: Signs of injury present  No swelling, tenderness or deformity  Comments: Left breast linear lesion now with dry crusting  There is no fluid expelled with patient pressing  There is mild dark discoloration surrounding the lesion  No erythema or edema  Neurological:      Mental Status: She is alert  I spent 20 minutes directly with the patient during this visit      VIRTUAL VISIT DISCLAIMER    Katerinabaron Connors acknowledges that she has consented to an online visit or consultation  She understands that the online visit is based solely on information provided by her, and that, in the absence of a face-to-face physical evaluation by the physician, the diagnosis she receives is both limited and provisional in terms of accuracy and completeness  This is not intended to replace a full medical face-to-face evaluation by the physician  Katerina Connors understands and accepts these terms

## 2020-08-19 NOTE — ASSESSMENT & PLAN NOTE
One episode of panic attack secondary to PTSD and anxiety after the loss of her mother and sister  She did reach out to therapist to helped  - we reviewed deep breathing exercises to relieve some of the symptoms  - continue therapy as a has been helping

## 2020-09-24 DIAGNOSIS — E78.2 MIXED HYPERLIPIDEMIA: ICD-10-CM

## 2020-09-24 RX ORDER — ATORVASTATIN CALCIUM 10 MG/1
TABLET, FILM COATED ORAL
Qty: 30 TABLET | Refills: 0 | Status: SHIPPED | OUTPATIENT
Start: 2020-09-24 | End: 2020-10-23

## 2020-10-02 ENCOUNTER — TELEMEDICINE (OUTPATIENT)
Dept: FAMILY MEDICINE CLINIC | Facility: CLINIC | Age: 35
End: 2020-10-02

## 2020-10-02 DIAGNOSIS — E66.01 MORBID OBESITY (HCC): Primary | ICD-10-CM

## 2020-10-02 DIAGNOSIS — E11.9 TYPE 2 DIABETES MELLITUS WITHOUT COMPLICATION, WITHOUT LONG-TERM CURRENT USE OF INSULIN (HCC): ICD-10-CM

## 2020-10-02 DIAGNOSIS — I10 BENIGN ESSENTIAL HTN: ICD-10-CM

## 2020-10-02 PROCEDURE — 99213 OFFICE O/P EST LOW 20 MIN: CPT | Performed by: FAMILY MEDICINE

## 2020-10-05 ENCOUNTER — TELEPHONE (OUTPATIENT)
Dept: FAMILY MEDICINE CLINIC | Facility: CLINIC | Age: 35
End: 2020-10-05

## 2020-10-19 ENCOUNTER — APPOINTMENT (OUTPATIENT)
Dept: LAB | Facility: CLINIC | Age: 35
End: 2020-10-19
Payer: COMMERCIAL

## 2020-10-19 DIAGNOSIS — E11.9 TYPE 2 DIABETES MELLITUS WITHOUT COMPLICATION, WITHOUT LONG-TERM CURRENT USE OF INSULIN (HCC): ICD-10-CM

## 2020-10-19 DIAGNOSIS — E78.2 MIXED HYPERLIPIDEMIA: ICD-10-CM

## 2020-10-19 LAB
CHOLEST SERPL-MCNC: 134 MG/DL (ref 50–200)
EST. AVERAGE GLUCOSE BLD GHB EST-MCNC: 111 MG/DL
HBA1C MFR BLD: 5.5 %
HDLC SERPL-MCNC: 49 MG/DL
LDLC SERPL CALC-MCNC: 73 MG/DL (ref 0–100)
TRIGL SERPL-MCNC: 62 MG/DL

## 2020-10-19 PROCEDURE — 36415 COLL VENOUS BLD VENIPUNCTURE: CPT

## 2020-10-19 PROCEDURE — 3044F HG A1C LEVEL LT 7.0%: CPT | Performed by: FAMILY MEDICINE

## 2020-10-19 PROCEDURE — 83036 HEMOGLOBIN GLYCOSYLATED A1C: CPT

## 2020-10-19 PROCEDURE — 80061 LIPID PANEL: CPT

## 2020-10-21 ENCOUNTER — TELEMEDICINE (OUTPATIENT)
Dept: FAMILY MEDICINE CLINIC | Facility: CLINIC | Age: 35
End: 2020-10-21

## 2020-10-21 DIAGNOSIS — E11.9 TYPE 2 DIABETES MELLITUS WITHOUT COMPLICATION, WITHOUT LONG-TERM CURRENT USE OF INSULIN (HCC): ICD-10-CM

## 2020-10-21 DIAGNOSIS — E78.2 MIXED HYPERLIPIDEMIA: Primary | ICD-10-CM

## 2020-10-21 DIAGNOSIS — I10 BENIGN ESSENTIAL HTN: ICD-10-CM

## 2020-10-21 PROCEDURE — 99213 OFFICE O/P EST LOW 20 MIN: CPT | Performed by: FAMILY MEDICINE

## 2020-10-22 ENCOUNTER — CLINICAL SUPPORT (OUTPATIENT)
Dept: NUTRITION | Facility: HOSPITAL | Age: 35
End: 2020-10-22
Payer: COMMERCIAL

## 2020-10-22 VITALS — HEIGHT: 67 IN | BODY MASS INDEX: 43.84 KG/M2 | WEIGHT: 279.3 LBS

## 2020-10-22 DIAGNOSIS — E78.2 MIXED HYPERLIPIDEMIA: ICD-10-CM

## 2020-10-22 DIAGNOSIS — E11.9 TYPE 2 DIABETES MELLITUS WITHOUT COMPLICATION, WITHOUT LONG-TERM CURRENT USE OF INSULIN (HCC): ICD-10-CM

## 2020-10-22 DIAGNOSIS — I10 BENIGN ESSENTIAL HTN: ICD-10-CM

## 2020-10-23 ENCOUNTER — TELEMEDICINE (OUTPATIENT)
Dept: FAMILY MEDICINE CLINIC | Facility: CLINIC | Age: 35
End: 2020-10-23

## 2020-10-23 DIAGNOSIS — K04.7 DENTAL ABSCESS: Primary | ICD-10-CM

## 2020-10-23 PROCEDURE — 99212 OFFICE O/P EST SF 10 MIN: CPT | Performed by: FAMILY MEDICINE

## 2020-10-23 RX ORDER — ATORVASTATIN CALCIUM 10 MG/1
TABLET, FILM COATED ORAL
Qty: 30 TABLET | Refills: 0 | Status: SHIPPED | OUTPATIENT
Start: 2020-10-23 | End: 2020-12-21

## 2020-10-23 RX ORDER — AMOXICILLIN 500 MG/1
500 CAPSULE ORAL EVERY 8 HOURS SCHEDULED
Qty: 21 CAPSULE | Refills: 0 | Status: SHIPPED | OUTPATIENT
Start: 2020-10-23 | End: 2020-10-30

## 2020-10-27 DIAGNOSIS — I10 BENIGN ESSENTIAL HTN: ICD-10-CM

## 2020-10-27 RX ORDER — AMLODIPINE BESYLATE 10 MG/1
TABLET ORAL
Qty: 90 TABLET | Refills: 0 | OUTPATIENT
Start: 2020-10-27

## 2020-10-27 RX ORDER — AMLODIPINE BESYLATE 10 MG/1
10 TABLET ORAL DAILY
Qty: 90 TABLET | Refills: 1 | Status: SHIPPED | OUTPATIENT
Start: 2020-10-27 | End: 2021-06-08 | Stop reason: SDUPTHER

## 2020-11-27 ENCOUNTER — OFFICE VISIT (OUTPATIENT)
Dept: FAMILY MEDICINE CLINIC | Facility: CLINIC | Age: 35
End: 2020-11-27

## 2020-11-27 DIAGNOSIS — F41.9 ANXIETY: Primary | ICD-10-CM

## 2020-11-27 DIAGNOSIS — F43.23 ADJUSTMENT DISORDER WITH MIXED ANXIETY AND DEPRESSED MOOD: ICD-10-CM

## 2020-11-27 DIAGNOSIS — J30.1 SEASONAL ALLERGIC RHINITIS DUE TO POLLEN: ICD-10-CM

## 2020-11-27 PROCEDURE — G2012 BRIEF CHECK IN BY MD/QHP: HCPCS | Performed by: FAMILY MEDICINE

## 2020-11-27 RX ORDER — FLUTICASONE PROPIONATE 50 MCG
1 SPRAY, SUSPENSION (ML) NASAL DAILY
Qty: 1 BOTTLE | Refills: 1 | Status: SHIPPED | OUTPATIENT
Start: 2020-11-27 | End: 2020-12-04 | Stop reason: SDUPTHER

## 2020-11-27 RX ORDER — SERTRALINE HYDROCHLORIDE 25 MG/1
25 TABLET, FILM COATED ORAL DAILY
COMMUNITY
End: 2020-11-27 | Stop reason: ALTCHOICE

## 2020-11-27 RX ORDER — KETOTIFEN FUMARATE 0.35 MG/ML
1 SOLUTION/ DROPS OPHTHALMIC 2 TIMES DAILY
Qty: 5 ML | Refills: 4 | Status: SHIPPED | OUTPATIENT
Start: 2020-11-27 | End: 2022-07-13

## 2020-11-27 RX ORDER — CETIRIZINE HYDROCHLORIDE 10 MG/1
10 TABLET ORAL DAILY
Qty: 30 TABLET | Refills: 3 | Status: SHIPPED | OUTPATIENT
Start: 2020-11-27 | End: 2022-04-07 | Stop reason: SDUPTHER

## 2020-11-27 RX ORDER — VENLAFAXINE HYDROCHLORIDE 37.5 MG/1
37.5 CAPSULE, EXTENDED RELEASE ORAL
Qty: 30 CAPSULE | Refills: 5 | Status: SHIPPED | OUTPATIENT
Start: 2020-11-27 | End: 2021-03-19 | Stop reason: ALTCHOICE

## 2020-12-04 ENCOUNTER — TELEMEDICINE (OUTPATIENT)
Dept: FAMILY MEDICINE CLINIC | Facility: CLINIC | Age: 35
End: 2020-12-04

## 2020-12-04 DIAGNOSIS — J30.1 SEASONAL ALLERGIC RHINITIS DUE TO POLLEN: Primary | ICD-10-CM

## 2020-12-04 DIAGNOSIS — J30.1 SEASONAL ALLERGIC RHINITIS DUE TO POLLEN: ICD-10-CM

## 2020-12-04 PROCEDURE — 99213 OFFICE O/P EST LOW 20 MIN: CPT | Performed by: FAMILY MEDICINE

## 2020-12-04 PROCEDURE — 1036F TOBACCO NON-USER: CPT | Performed by: FAMILY MEDICINE

## 2020-12-04 RX ORDER — FLUTICASONE PROPIONATE 50 MCG
1 SPRAY, SUSPENSION (ML) NASAL DAILY
Qty: 1 BOTTLE | Refills: 1 | Status: SHIPPED | OUTPATIENT
Start: 2020-12-04 | End: 2021-03-19 | Stop reason: SDUPTHER

## 2020-12-21 ENCOUNTER — HOSPITAL ENCOUNTER (EMERGENCY)
Facility: HOSPITAL | Age: 35
Discharge: HOME | End: 2020-12-21
Attending: EMERGENCY MEDICINE | Admitting: EMERGENCY MEDICINE
Payer: COMMERCIAL

## 2020-12-21 VITALS
HEART RATE: 119 BPM | RESPIRATION RATE: 20 BRPM | WEIGHT: 277.1 LBS | OXYGEN SATURATION: 100 % | DIASTOLIC BLOOD PRESSURE: 85 MMHG | TEMPERATURE: 97.2 F | SYSTOLIC BLOOD PRESSURE: 158 MMHG | BODY MASS INDEX: 43.4 KG/M2

## 2020-12-21 DIAGNOSIS — E78.2 MIXED HYPERLIPIDEMIA: ICD-10-CM

## 2020-12-21 DIAGNOSIS — J45.20 MILD INTERMITTENT ASTHMA WITHOUT COMPLICATION: ICD-10-CM

## 2020-12-21 DIAGNOSIS — Z20.822 SUSPECTED COVID-19 VIRUS INFECTION: Primary | ICD-10-CM

## 2020-12-21 LAB
FLUAV RNA RESP QL NAA+PROBE: NEGATIVE
FLUBV RNA RESP QL NAA+PROBE: NEGATIVE
RSV RNA RESP QL NAA+PROBE: NEGATIVE
SARS-COV-2 RNA RESP QL NAA+PROBE: NEGATIVE

## 2020-12-21 PROCEDURE — 99283 EMERGENCY DEPT VISIT LOW MDM: CPT

## 2020-12-21 PROCEDURE — 99284 EMERGENCY DEPT VISIT MOD MDM: CPT | Performed by: PHYSICIAN ASSISTANT

## 2020-12-21 PROCEDURE — 0241U HB NFCT DS VIR RESP RNA 4 TRGT: CPT | Performed by: PHYSICIAN ASSISTANT

## 2020-12-21 RX ORDER — BENZONATATE 100 MG/1
100 CAPSULE ORAL EVERY 8 HOURS
Qty: 21 CAPSULE | Refills: 0 | Status: SHIPPED | OUTPATIENT
Start: 2020-12-21 | End: 2022-04-07 | Stop reason: ALTCHOICE

## 2020-12-21 RX ORDER — FLUTICASONE PROPIONATE 50 MCG
1 SPRAY, SUSPENSION (ML) NASAL DAILY
Qty: 16 G | Refills: 0 | Status: SHIPPED | OUTPATIENT
Start: 2020-12-21 | End: 2021-03-19

## 2020-12-21 RX ORDER — ATORVASTATIN CALCIUM 10 MG/1
TABLET, FILM COATED ORAL
Qty: 30 TABLET | Refills: 0 | Status: SHIPPED | OUTPATIENT
Start: 2020-12-21 | End: 2021-01-20

## 2020-12-23 RX ORDER — ALBUTEROL SULFATE 90 UG/1
AEROSOL, METERED RESPIRATORY (INHALATION)
Qty: 8.5 G | Refills: 0 | OUTPATIENT
Start: 2020-12-23

## 2021-01-20 DIAGNOSIS — E78.2 MIXED HYPERLIPIDEMIA: ICD-10-CM

## 2021-01-20 RX ORDER — ATORVASTATIN CALCIUM 10 MG/1
TABLET, FILM COATED ORAL
Qty: 30 TABLET | Refills: 3 | Status: SHIPPED | OUTPATIENT
Start: 2021-01-20 | End: 2022-05-19 | Stop reason: SDUPTHER

## 2021-01-29 ENCOUNTER — TELEPHONE (OUTPATIENT)
Dept: FAMILY MEDICINE CLINIC | Facility: CLINIC | Age: 36
End: 2021-01-29

## 2021-03-19 ENCOUNTER — TELEMEDICINE (OUTPATIENT)
Dept: FAMILY MEDICINE CLINIC | Facility: CLINIC | Age: 36
End: 2021-03-19

## 2021-03-19 DIAGNOSIS — J30.1 SEASONAL ALLERGIC RHINITIS DUE TO POLLEN: ICD-10-CM

## 2021-03-19 DIAGNOSIS — K21.9 GASTROESOPHAGEAL REFLUX DISEASE WITHOUT ESOPHAGITIS: ICD-10-CM

## 2021-03-19 DIAGNOSIS — E11.9 TYPE 2 DIABETES MELLITUS WITHOUT COMPLICATION, WITHOUT LONG-TERM CURRENT USE OF INSULIN (HCC): Primary | ICD-10-CM

## 2021-03-19 DIAGNOSIS — I10 BENIGN ESSENTIAL HTN: ICD-10-CM

## 2021-03-19 DIAGNOSIS — F41.9 ANXIETY: ICD-10-CM

## 2021-03-19 PROCEDURE — 99213 OFFICE O/P EST LOW 20 MIN: CPT | Performed by: FAMILY MEDICINE

## 2021-03-19 RX ORDER — FAMOTIDINE 20 MG/1
20 TABLET, FILM COATED ORAL DAILY
Qty: 60 TABLET | Refills: 1 | Status: SHIPPED | OUTPATIENT
Start: 2021-03-19 | End: 2021-11-11

## 2021-03-19 RX ORDER — FLUTICASONE PROPIONATE 50 MCG
1 SPRAY, SUSPENSION (ML) NASAL DAILY
Qty: 1 BOTTLE | Refills: 1 | Status: SHIPPED | OUTPATIENT
Start: 2021-03-19 | End: 2021-07-12 | Stop reason: SDUPTHER

## 2021-03-19 NOTE — PROGRESS NOTES
Virtual Regular Visit      Assessment/Plan:    Problem List Items Addressed This Visit        Digestive    Gastroesophageal reflux disease without esophagitis      Symptoms are not frequent but worse when eating acidic foods such as tomatoes or tomato sauce  Patient has reflux that sometimes wakes her up from sleep   -  Recommended to decrease or avoid foods that trigger symptoms   -   Recommended elevate the head of the bed when sleeping   -    Prescribed famotidine 20 mg daily  -  3 months follow-up  Relevant Medications    famotidine (PEPCID) 20 mg tablet       Endocrine    Type 2 diabetes mellitus without complication, without long-term current use of insulin ([facility] [address] ) - Primary       Lab Results   Component Value Date    HGBA1C 5 5 10/19/2020     Last A1c was 5 5 in  10/19/2020  This was done by diet control  Patient had lost approximately 30 lb at the time and has maintained that weight   -   Recheck hemoglobin A1c  Relevant Orders    HEMOGLOBIN A1C W/ EAG ESTIMATION       Respiratory    Seasonal allergic rhinitis due to pollen       Symptoms much improved with the use of Zyrtec 10 mg daily  Patient has not been using Flonase however she is worried that his symptoms will worsen as the spring approaches  -   Continue Zyrtec 10 mg daily and refilled Flonase  Relevant Medications    fluticasone (FLONASE) 50 mcg/act nasal spray       Cardiovascular and Mediastinum    Benign essential HTN      Patient currently uses amlodipine 10 mg daily  She has not been checking her blood pressures at home  She denies any headaches, change of vision, chest pain, abdominal pain or shortness of breath  -  Recommended a low-salt diet and to check blood pressures mid day daily  -  Three-month follow-up in office  Other    Anxiety      Patient follows with Dr Severo Ranks from Psychiatry  She now is using Zoloft 50 mg daily    She also follows with a therapist     Patient appears to have agoraphobia as she continues to avoid crowded places  She only shots at 2 supermarkets at specific times of the day where she knows there will be no one inside  She also is anxious about the thought of coming into the office for her next appointment  -   Continue medications and follow up with psychiatry and therapy  Reason for visit is   Chief Complaint   Patient presents with    Virtual Regular Visit        Encounter provider Adama Moreland MD    Provider located at [address] [address] [phone]      Recent Visits  No visits were found meeting these conditions  Showing recent visits within past 7 days and meeting all other requirements     Today's Visits  Date Type Provider Dept   03/19/21 Telemedicine Adama Moreland MD [facility]   Showing today's visits and meeting all other requirements     Future Appointments  No visits were found meeting these conditions  Showing future appointments within next 150 days and meeting all other requirements        The patient was identified by name and date of birth  Booker House was informed that this is a telemedicine visit and that the visit is being conducted through telephone  My office door was closed  No one else was in the room  She acknowledged consent and understanding of privacy and security of the video platform  The patient has agreed to participate and understands they can discontinue the visit at any time  Patient is aware this is a billable service  Subjective  Booker House is a 38 y o  female   Patient is a very pleasant 28-year-old female who presents for virtual visit regarding her chronic medical conditions such as anxiety, GERD, diabetes,  Allergic rhinitis and hypertension         Past Medical History:   Diagnosis Date    Pseudotumor cerebri        Past Surgical History:   Procedure Laterality Date    TONSILLECTOMY Current Outpatient Medications   Medication Sig Dispense Refill    acetaminophen (TYLENOL) 500 mg tablet Take 1 tablet (500 mg total) by mouth every 6 (six) hours as needed for mild pain 30 tablet 0    albuterol (PROVENTIL HFA,VENTOLIN HFA) 90 mcg/act inhaler Inhale 2 puffs every 6 (six) hours as needed for wheezing or shortness of breath 2 Inhaler 2    amLODIPine (NORVASC) 10 mg tablet Take 1 tablet (10 mg total) by mouth daily 90 tablet 1    atorvastatin (LIPITOR) 10 mg tablet TAKE 1 TABLET BY MOUTH ONCE DAILY  30 tablet 3    benzonatate (TESSALON PERLES) 100 mg capsule Take 1 capsule (100 mg total) by mouth every 8 (eight) hours 21 capsule 0    Blood Pressure KIT by Does not apply route daily 1 each 0    cetirizine (ZyrTEC) 10 mg tablet Take 1 tablet (10 mg total) by mouth daily 30 tablet 3    dextromethorphan-guaifenesin (MUCINEX DM)  MG per 12 hr tablet Take 1 tablet by mouth every 12 (twelve) hours (Patient not taking: Reported on 12/21/2020) 30 tablet 0    famotidine (PEPCID) 20 mg tablet Take 1 tablet (20 mg total) by mouth daily 60 tablet 1    fluticasone (FLONASE) 50 mcg/act nasal spray 1 spray into each nostril daily 1 Bottle 1    ketotifen (ZADITOR) 0 025 % ophthalmic solution Administer 1 drop to both eyes 2 (two) times a day (Patient not taking: Reported on 12/21/2020) 5 mL 4    omeprazole (PriLOSEC) 40 MG capsule Take 1 capsule (40 mg total) by mouth daily before breakfast (Patient not taking: Reported on 12/21/2020) 30 capsule 0    silver sulfadiazine (SILVADENE,SSD) 1 % cream Apply topically daily (Patient not taking: Reported on 12/21/2020) 50 g 0    Vitamin E 100 units TABS Take 0 5 tablets (50 Units total) by mouth daily 30 tablet 0     No current facility-administered medications for this visit           Allergies   Allergen Reactions    Bee Venom Anaphylaxis     Anaphylaxis    Nabumetone Anaphylaxis     Anaphylaxis    Orange Syrup Anaphylaxis    Prochlorperazine Anaphylaxis     Anaphylaxis    Chlorpheniramine     Codeine     Diphenhydramine      Other reaction(s): head and neck swelling    Pseudoephedrine     Tetanus Toxoid        Review of Systems   Constitutional: Negative for activity change, appetite change and fever  HENT: Negative for congestion and sore throat  Eyes: Negative for visual disturbance  Respiratory: Negative for cough, shortness of breath and wheezing  Cardiovascular: Negative for chest pain and palpitations  Gastrointestinal: Negative for abdominal distention, abdominal pain, constipation, diarrhea and vomiting  Musculoskeletal: Negative for arthralgias  Skin: Negative for rash  Neurological: Negative for headaches  Psychiatric/Behavioral: The patient is nervous/anxious  Video Exam    There were no vitals filed for this visit  Physical Exam: No cough or conversational dyspnea appreciated  I spent 30 minutes directly with the patient during this visit      VIRTUAL VISIT DISCLAIMER    Del Mg acknowledges that she has consented to an online visit or consultation  She understands that the online visit is based solely on information provided by her, and that, in the absence of a face-to-face physical evaluation by the physician, the diagnosis she receives is both limited and provisional in terms of accuracy and completeness  This is not intended to replace a full medical face-to-face evaluation by the physician  Del Mg understands and accepts these terms

## 2021-03-19 NOTE — ASSESSMENT & PLAN NOTE
Lab Results   Component Value Date    HGBA1C 5 5 10/19/2020     Last A1c was 5 5 in  10/19/2020  This was done by diet control  Patient had lost approximately 30 lb at the time and has maintained that weight   -   Recheck hemoglobin A1c

## 2021-03-19 NOTE — ASSESSMENT & PLAN NOTE
Patient follows with Dr David Avila from Psychiatry  She now is using Zoloft 50 mg daily  She also follows with a therapist     Patient appears to have agoraphobia as she continues to avoid crowded places  She only shots at 2 supermarkets at specific times of the day where she knows there will be no one inside  She also is anxious about the thought of coming into the office for her next appointment  -   Continue medications and follow up with psychiatry and therapy

## 2021-03-19 NOTE — ASSESSMENT & PLAN NOTE
Patient currently uses amlodipine 10 mg daily  She has not been checking her blood pressures at home  She denies any headaches, change of vision, chest pain, abdominal pain or shortness of breath  -  Recommended a low-salt diet and to check blood pressures mid day daily  -  Three-month follow-up in office

## 2021-03-19 NOTE — ASSESSMENT & PLAN NOTE
Symptoms much improved with the use of Zyrtec 10 mg daily  Patient has not been using Flonase however she is worried that his symptoms will worsen as the spring approaches  -   Continue Zyrtec 10 mg daily and refilled Flonase

## 2021-03-19 NOTE — ASSESSMENT & PLAN NOTE
Symptoms are not frequent but worse when eating acidic foods such as tomatoes or tomato sauce  Patient has reflux that sometimes wakes her up from sleep   -  Recommended to decrease or avoid foods that trigger symptoms   -   Recommended elevate the head of the bed when sleeping   -    Prescribed famotidine 20 mg daily  -  3 months follow-up

## 2021-06-08 DIAGNOSIS — I10 BENIGN ESSENTIAL HTN: ICD-10-CM

## 2021-06-09 ENCOUNTER — APPOINTMENT (OUTPATIENT)
Dept: LAB | Facility: CLINIC | Age: 36
End: 2021-06-09
Payer: COMMERCIAL

## 2021-06-09 DIAGNOSIS — E11.9 TYPE 2 DIABETES MELLITUS WITHOUT COMPLICATION, WITHOUT LONG-TERM CURRENT USE OF INSULIN (HCC): ICD-10-CM

## 2021-06-09 LAB
EST. AVERAGE GLUCOSE BLD GHB EST-MCNC: 120 MG/DL
HBA1C MFR BLD: 5.8 %

## 2021-06-09 PROCEDURE — 36415 COLL VENOUS BLD VENIPUNCTURE: CPT

## 2021-06-09 PROCEDURE — 83036 HEMOGLOBIN GLYCOSYLATED A1C: CPT

## 2021-06-09 RX ORDER — AMLODIPINE BESYLATE 10 MG/1
10 TABLET ORAL DAILY
Qty: 90 TABLET | Refills: 1 | Status: SHIPPED | OUTPATIENT
Start: 2021-06-09 | End: 2022-04-07 | Stop reason: SDUPTHER

## 2021-06-09 NOTE — TELEPHONE ENCOUNTER
Pt calling about medication  States she has not had it in 3 weeks  Per pharmacy they have made requests but I do not see anything on file

## 2021-07-12 ENCOUNTER — OFFICE VISIT (OUTPATIENT)
Dept: FAMILY MEDICINE CLINIC | Facility: CLINIC | Age: 36
End: 2021-07-12

## 2021-07-12 VITALS
RESPIRATION RATE: 18 BRPM | WEIGHT: 293 LBS | HEIGHT: 67 IN | DIASTOLIC BLOOD PRESSURE: 80 MMHG | OXYGEN SATURATION: 97 % | HEART RATE: 108 BPM | BODY MASS INDEX: 45.99 KG/M2 | SYSTOLIC BLOOD PRESSURE: 130 MMHG | TEMPERATURE: 97.6 F

## 2021-07-12 DIAGNOSIS — F41.9 ANXIETY: ICD-10-CM

## 2021-07-12 DIAGNOSIS — J30.1 SEASONAL ALLERGIC RHINITIS DUE TO POLLEN: ICD-10-CM

## 2021-07-12 DIAGNOSIS — E11.9 TYPE 2 DIABETES MELLITUS WITHOUT COMPLICATION, WITHOUT LONG-TERM CURRENT USE OF INSULIN (HCC): Primary | ICD-10-CM

## 2021-07-12 DIAGNOSIS — I10 BENIGN ESSENTIAL HTN: ICD-10-CM

## 2021-07-12 PROCEDURE — 3075F SYST BP GE 130 - 139MM HG: CPT | Performed by: INTERNAL MEDICINE

## 2021-07-12 PROCEDURE — 99213 OFFICE O/P EST LOW 20 MIN: CPT | Performed by: INTERNAL MEDICINE

## 2021-07-12 PROCEDURE — 3079F DIAST BP 80-89 MM HG: CPT | Performed by: INTERNAL MEDICINE

## 2021-07-12 PROCEDURE — T1015 CLINIC SERVICE: HCPCS | Performed by: INTERNAL MEDICINE

## 2021-07-12 RX ORDER — FLUTICASONE PROPIONATE 50 MCG
1 SPRAY, SUSPENSION (ML) NASAL DAILY
Qty: 15.8 ML | Refills: 1 | Status: SHIPPED | OUTPATIENT
Start: 2021-07-12 | End: 2022-07-13 | Stop reason: SDUPTHER

## 2021-07-12 RX ORDER — SERTRALINE HYDROCHLORIDE 100 MG/1
100 TABLET, FILM COATED ORAL DAILY
COMMUNITY
End: 2022-07-13

## 2021-07-12 NOTE — ASSESSMENT & PLAN NOTE
Follows with Joselin Small for therapy and psychiatry  - Currently on Sertraline 100 mg daily  - Continue current management
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
## 2021-07-12 NOTE — ASSESSMENT & PLAN NOTE
Symptoms of nasal congestion and mild cough  Exam shows erythematous and edematous nasal turbinates  - Refill Flonase

## 2021-07-12 NOTE — ASSESSMENT & PLAN NOTE
Lab Results   Component Value Date    HGBA1C 5 8 (H) 06/09/2021     Well controlled, currently in the prediabetic range  Patient has adjusted diet after seeing this number by decreasing carbohydrates such as bread, potatoes, pasta and rice  - Continue current management

## 2021-07-15 ENCOUNTER — OFFICE VISIT (OUTPATIENT)
Dept: FAMILY MEDICINE CLINIC | Facility: CLINIC | Age: 36
End: 2021-07-15

## 2021-07-15 VITALS
DIASTOLIC BLOOD PRESSURE: 76 MMHG | TEMPERATURE: 98 F | RESPIRATION RATE: 16 BRPM | BODY MASS INDEX: 45.99 KG/M2 | SYSTOLIC BLOOD PRESSURE: 112 MMHG | OXYGEN SATURATION: 98 % | HEART RATE: 113 BPM | WEIGHT: 293 LBS | HEIGHT: 67 IN

## 2021-07-15 DIAGNOSIS — Z34.90 PREGNANCY AT EARLY STAGE: Primary | ICD-10-CM

## 2021-07-15 PROCEDURE — T1015 CLINIC SERVICE: HCPCS | Performed by: INTERNAL MEDICINE

## 2021-07-15 PROCEDURE — 99213 OFFICE O/P EST LOW 20 MIN: CPT | Performed by: INTERNAL MEDICINE
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
## 2021-07-15 NOTE — ASSESSMENT & PLAN NOTE
[facility] approximately 400 2 days ago  Patient planning on having  and is currently in contact with OBGYN  Plans to have ultrasound in 5 days for location  Symptoms of morning nausea and headache likely related to early pregnancy symptoms  - If patient decides to continue with her pregnancy, she will eliminate medications as recommended by OBGYN   - Stay well hydrated

## 2021-11-11 ENCOUNTER — OFFICE VISIT (OUTPATIENT)
Dept: FAMILY MEDICINE CLINIC | Facility: CLINIC | Age: 36
End: 2021-11-11

## 2021-11-11 VITALS
TEMPERATURE: 97.9 F | SYSTOLIC BLOOD PRESSURE: 128 MMHG | RESPIRATION RATE: 20 BRPM | WEIGHT: 293 LBS | HEIGHT: 67 IN | DIASTOLIC BLOOD PRESSURE: 84 MMHG | OXYGEN SATURATION: 99 % | BODY MASS INDEX: 45.99 KG/M2 | HEART RATE: 102 BPM

## 2021-11-11 DIAGNOSIS — Z11.59 NEED FOR HEPATITIS C SCREENING TEST: ICD-10-CM

## 2021-11-11 DIAGNOSIS — F43.23 ADJUSTMENT DISORDER WITH MIXED ANXIETY AND DEPRESSED MOOD: ICD-10-CM

## 2021-11-11 DIAGNOSIS — E11.9 TYPE 2 DIABETES MELLITUS WITHOUT COMPLICATION, WITHOUT LONG-TERM CURRENT USE OF INSULIN (HCC): Primary | ICD-10-CM

## 2021-11-11 DIAGNOSIS — I10 BENIGN ESSENTIAL HTN: ICD-10-CM

## 2021-11-11 DIAGNOSIS — E66.01 CLASS 3 SEVERE OBESITY DUE TO EXCESS CALORIES WITH SERIOUS COMORBIDITY AND BODY MASS INDEX (BMI) OF 45.0 TO 49.9 IN ADULT (HCC): ICD-10-CM

## 2021-11-11 DIAGNOSIS — Z11.4 SCREENING FOR HIV (HUMAN IMMUNODEFICIENCY VIRUS): ICD-10-CM

## 2021-11-11 PROBLEM — T21.21XA: Status: RESOLVED | Noted: 2020-08-10 | Resolved: 2021-11-11

## 2021-11-11 LAB — SL AMB POCT HEMOGLOBIN AIC: 6.7 (ref ?–6.5)

## 2021-11-11 PROCEDURE — 83036 HEMOGLOBIN GLYCOSYLATED A1C: CPT | Performed by: FAMILY MEDICINE

## 2021-11-11 PROCEDURE — 99213 OFFICE O/P EST LOW 20 MIN: CPT | Performed by: FAMILY MEDICINE

## 2021-11-11 RX ORDER — METFORMIN HYDROCHLORIDE 500 MG/1
500 TABLET, EXTENDED RELEASE ORAL
Qty: 60 TABLET | Refills: 1 | Status: SHIPPED | OUTPATIENT
Start: 2021-11-11 | End: 2022-07-15

## 2021-12-24 ENCOUNTER — HOSPITAL ENCOUNTER (EMERGENCY)
Facility: HOSPITAL | Age: 36
Discharge: HOME | End: 2021-12-24
Attending: EMERGENCY MEDICINE
Payer: COMMERCIAL

## 2021-12-24 VITALS
SYSTOLIC BLOOD PRESSURE: 167 MMHG | OXYGEN SATURATION: 97 % | RESPIRATION RATE: 20 BRPM | HEART RATE: 133 BPM | TEMPERATURE: 98.8 F | DIASTOLIC BLOOD PRESSURE: 100 MMHG

## 2021-12-24 DIAGNOSIS — R09.82 POST-NASAL DRIP: ICD-10-CM

## 2021-12-24 DIAGNOSIS — J34.89 RHINORRHEA: ICD-10-CM

## 2021-12-24 DIAGNOSIS — R09.81 NASAL CONGESTION: Primary | ICD-10-CM

## 2021-12-24 PROCEDURE — 87636 SARSCOV2 & INF A&B AMP PRB: CPT | Performed by: EMERGENCY MEDICINE

## 2021-12-24 PROCEDURE — 99283 EMERGENCY DEPT VISIT LOW MDM: CPT

## 2021-12-24 PROCEDURE — 99284 EMERGENCY DEPT VISIT MOD MDM: CPT | Performed by: EMERGENCY MEDICINE

## 2021-12-24 RX ORDER — OXYMETAZOLINE HYDROCHLORIDE 0.05 G/100ML
2 SPRAY NASAL ONCE
Status: COMPLETED | OUTPATIENT
Start: 2021-12-24 | End: 2021-12-24

## 2021-12-24 RX ORDER — FLUTICASONE PROPIONATE 50 MCG
1 SPRAY, SUSPENSION (ML) NASAL DAILY
Qty: 16 G | Refills: 0 | Status: SHIPPED | OUTPATIENT
Start: 2021-12-24 | End: 2022-04-07 | Stop reason: ALTCHOICE

## 2021-12-24 RX ADMIN — OXYMETAZOLINE HYDROCHLORIDE 2 SPRAY: 0.05 SPRAY NASAL at 10:48

## 2021-12-26 LAB
FLUAV RNA RESP QL NAA+PROBE: NEGATIVE
FLUBV RNA RESP QL NAA+PROBE: NEGATIVE
SARS-COV-2 RNA RESP QL NAA+PROBE: POSITIVE

## 2022-04-07 ENCOUNTER — OFFICE VISIT (OUTPATIENT)
Dept: FAMILY MEDICINE CLINIC | Facility: CLINIC | Age: 37
End: 2022-04-07

## 2022-04-07 ENCOUNTER — APPOINTMENT (OUTPATIENT)
Dept: LAB | Facility: CLINIC | Age: 37
End: 2022-04-07
Payer: COMMERCIAL

## 2022-04-07 VITALS
RESPIRATION RATE: 16 BRPM | HEART RATE: 113 BPM | DIASTOLIC BLOOD PRESSURE: 84 MMHG | TEMPERATURE: 98.5 F | HEIGHT: 67 IN | OXYGEN SATURATION: 97 % | WEIGHT: 293 LBS | SYSTOLIC BLOOD PRESSURE: 132 MMHG | BODY MASS INDEX: 45.99 KG/M2

## 2022-04-07 DIAGNOSIS — E11.9 TYPE 2 DIABETES MELLITUS WITHOUT COMPLICATION, WITHOUT LONG-TERM CURRENT USE OF INSULIN (HCC): Primary | ICD-10-CM

## 2022-04-07 DIAGNOSIS — I10 BENIGN ESSENTIAL HTN: ICD-10-CM

## 2022-04-07 DIAGNOSIS — Z11.59 NEED FOR HEPATITIS C SCREENING TEST: ICD-10-CM

## 2022-04-07 DIAGNOSIS — E11.9 TYPE 2 DIABETES MELLITUS WITHOUT COMPLICATION, WITHOUT LONG-TERM CURRENT USE OF INSULIN (HCC): ICD-10-CM

## 2022-04-07 DIAGNOSIS — E78.5 HYPERLIPIDEMIA, UNSPECIFIED HYPERLIPIDEMIA TYPE: ICD-10-CM

## 2022-04-07 DIAGNOSIS — K21.9 GASTROESOPHAGEAL REFLUX DISEASE WITHOUT ESOPHAGITIS: ICD-10-CM

## 2022-04-07 DIAGNOSIS — J45.20 MILD INTERMITTENT ASTHMA WITHOUT COMPLICATION: ICD-10-CM

## 2022-04-07 DIAGNOSIS — Z11.4 SCREENING FOR HIV (HUMAN IMMUNODEFICIENCY VIRUS): ICD-10-CM

## 2022-04-07 DIAGNOSIS — J30.1 SEASONAL ALLERGIC RHINITIS DUE TO POLLEN: ICD-10-CM

## 2022-04-07 DIAGNOSIS — E78.2 MIXED HYPERLIPIDEMIA: ICD-10-CM

## 2022-04-07 DIAGNOSIS — F32.A DEPRESSIVE DISORDER: ICD-10-CM

## 2022-04-07 LAB
ALBUMIN SERPL BCP-MCNC: 3.7 G/DL (ref 3.5–5)
ALP SERPL-CCNC: 79 U/L (ref 46–116)
ALT SERPL W P-5'-P-CCNC: 14 U/L (ref 12–78)
ANION GAP SERPL CALCULATED.3IONS-SCNC: 6 MMOL/L (ref 4–13)
AST SERPL W P-5'-P-CCNC: 9 U/L (ref 5–45)
BASOPHILS # BLD AUTO: 0.04 THOUSANDS/ΜL (ref 0–0.1)
BASOPHILS NFR BLD AUTO: 0 % (ref 0–1)
BILIRUB SERPL-MCNC: 0.28 MG/DL (ref 0.2–1)
BUN SERPL-MCNC: 14 MG/DL (ref 5–25)
CALCIUM SERPL-MCNC: 9.2 MG/DL (ref 8.3–10.1)
CHLORIDE SERPL-SCNC: 107 MMOL/L (ref 100–108)
CO2 SERPL-SCNC: 26 MMOL/L (ref 21–32)
CREAT SERPL-MCNC: 1.01 MG/DL (ref 0.6–1.3)
CREAT UR-MCNC: 174 MG/DL
EOSINOPHIL # BLD AUTO: 0.1 THOUSAND/ΜL (ref 0–0.61)
EOSINOPHIL NFR BLD AUTO: 1 % (ref 0–6)
ERYTHROCYTE [DISTWIDTH] IN BLOOD BY AUTOMATED COUNT: 12.5 % (ref 11.6–15.1)
GFR SERPL CREATININE-BSD FRML MDRD: 71 ML/MIN/1.73SQ M
GLUCOSE P FAST SERPL-MCNC: 108 MG/DL (ref 65–99)
HCT VFR BLD AUTO: 36 % (ref 34.8–46.1)
HCV AB SER QL: NORMAL
HGB BLD-MCNC: 11 G/DL (ref 11.5–15.4)
IMM GRANULOCYTES # BLD AUTO: 0.07 THOUSAND/UL (ref 0–0.2)
IMM GRANULOCYTES NFR BLD AUTO: 1 % (ref 0–2)
LYMPHOCYTES # BLD AUTO: 2.74 THOUSANDS/ΜL (ref 0.6–4.47)
LYMPHOCYTES NFR BLD AUTO: 24 % (ref 14–44)
MCH RBC QN AUTO: 26.8 PG (ref 26.8–34.3)
MCHC RBC AUTO-ENTMCNC: 30.6 G/DL (ref 31.4–37.4)
MCV RBC AUTO: 88 FL (ref 82–98)
MICROALBUMIN UR-MCNC: 9.2 MG/L (ref 0–20)
MICROALBUMIN/CREAT 24H UR: 5 MG/G CREATININE (ref 0–30)
MONOCYTES # BLD AUTO: 0.56 THOUSAND/ΜL (ref 0.17–1.22)
MONOCYTES NFR BLD AUTO: 5 % (ref 4–12)
NEUTROPHILS # BLD AUTO: 7.78 THOUSANDS/ΜL (ref 1.85–7.62)
NEUTS SEG NFR BLD AUTO: 69 % (ref 43–75)
NRBC BLD AUTO-RTO: 0 /100 WBCS
PLATELET # BLD AUTO: 337 THOUSANDS/UL (ref 149–390)
PMV BLD AUTO: 11.7 FL (ref 8.9–12.7)
POTASSIUM SERPL-SCNC: 3.7 MMOL/L (ref 3.5–5.3)
PROT SERPL-MCNC: 8 G/DL (ref 6.4–8.2)
RBC # BLD AUTO: 4.1 MILLION/UL (ref 3.81–5.12)
SL AMB POCT HEMOGLOBIN AIC: 6.8 (ref ?–6.5)
SODIUM SERPL-SCNC: 139 MMOL/L (ref 136–145)
WBC # BLD AUTO: 11.29 THOUSAND/UL (ref 4.31–10.16)

## 2022-04-07 PROCEDURE — 99213 OFFICE O/P EST LOW 20 MIN: CPT | Performed by: FAMILY MEDICINE

## 2022-04-07 PROCEDURE — 80053 COMPREHEN METABOLIC PANEL: CPT

## 2022-04-07 PROCEDURE — 83036 HEMOGLOBIN GLYCOSYLATED A1C: CPT | Performed by: FAMILY MEDICINE

## 2022-04-07 PROCEDURE — 85025 COMPLETE CBC W/AUTO DIFF WBC: CPT

## 2022-04-07 PROCEDURE — 36415 COLL VENOUS BLD VENIPUNCTURE: CPT

## 2022-04-07 PROCEDURE — 82570 ASSAY OF URINE CREATININE: CPT | Performed by: FAMILY MEDICINE

## 2022-04-07 PROCEDURE — 86803 HEPATITIS C AB TEST: CPT

## 2022-04-07 PROCEDURE — 82043 UR ALBUMIN QUANTITATIVE: CPT | Performed by: FAMILY MEDICINE

## 2022-04-07 PROCEDURE — 87389 HIV-1 AG W/HIV-1&-2 AB AG IA: CPT

## 2022-04-07 RX ORDER — ALBUTEROL SULFATE 90 UG/1
2 AEROSOL, METERED RESPIRATORY (INHALATION) EVERY 6 HOURS PRN
Qty: 6.7 G | Refills: 0 | Status: SHIPPED | OUTPATIENT
Start: 2022-04-07

## 2022-04-07 RX ORDER — CETIRIZINE HYDROCHLORIDE 10 MG/1
10 TABLET ORAL DAILY
Qty: 30 TABLET | Refills: 3 | Status: SHIPPED | OUTPATIENT
Start: 2022-04-07 | End: 2022-07-13

## 2022-04-07 RX ORDER — AMLODIPINE BESYLATE 10 MG/1
10 TABLET ORAL DAILY
Qty: 90 TABLET | Refills: 1 | Status: SHIPPED | OUTPATIENT
Start: 2022-04-07

## 2022-04-07 RX ORDER — OMEPRAZOLE 40 MG/1
40 CAPSULE, DELAYED RELEASE ORAL DAILY
Qty: 90 CAPSULE | Refills: 0 | Status: SHIPPED | OUTPATIENT
Start: 2022-04-07 | End: 2022-05-19

## 2022-04-07 NOTE — ASSESSMENT & PLAN NOTE
Well controlled;  132/84 today, goal is <140/<90   - Continue Amlodipine 10 mg daily   - Smoking cessation counseling as patient currently smokes 3-4 cigarettes daily

## 2022-04-07 NOTE — ASSESSMENT & PLAN NOTE
Of more than 1 year duration   Famotidine 20mg used to help however symptoms worsened the past few weeks     Will obtain Stool [facility] , once sample is give patient can start her protonix 40mg daily, will prescribe

## 2022-04-07 NOTE — PROGRESS NOTES
Assessment/Plan:    Gastroesophageal reflux disease without esophagitis  Of more than 1 year duration   Famotidine 20mg used to help however symptoms worsened the past few weeks     Will obtain Stool h polori , once sample is give patient can start her protonix 40mg daily, will prescribe        Type 2 diabetes mellitus without complication, without long-term current use of insulin ([facility] )    Lab Results   Component Value Date    HGBA1C 6 8 (A) 04/07/2022   Well controlled however slightly worsen, A1C today is 6 8 from 6 7   Diet controlled   - Diet controlled discussed and patient has previously decreased it just with diet control   - Metformin 500 mg daily offered, however pt declines, patient still want to continue with lifestyle change first,   -will reassess in 3 months       Mild intermittent asthma without complication  Well manage with albuterol PRN     Benign essential HTN  Well controlled;  132/84 today, goal is <140/<90   - Continue Amlodipine 10 mg daily   - Smoking cessation counseling as patient currently smokes 3-4 cigarettes daily  Mixed hyperlipidemia  Patient not taking cholesterol medication  She want to continue lifestyle management    Depressive disorder  Follows with psychiatry Dr Berry Pham       Diagnoses and all orders for this visit:    Type 2 diabetes mellitus without complication, without long-term current use of insulin ([facility])  -     POCT hemoglobin A1c    Mild intermittent asthma without complication  -     albuterol (PROVENTIL HFA,VENTOLIN HFA) 90 mcg/act inhaler; Inhale 2 puffs every 6 (six) hours as needed for wheezing or shortness of breath    Benign essential HTN  -     amLODIPine (NORVASC) 10 mg tablet; Take 1 tablet (10 mg total) by mouth daily    Hyperlipidemia, unspecified hyperlipidemia type  -     Lipid Panel with Direct LDL reflex; Future    Mixed hyperlipidemia    Seasonal allergic rhinitis due to pollen  Comments:  Refill patient's flonase and ketotifen   Will start patient on Zyrtec as well and recommended to get a humidifer in room for bedtime   Orders:  -     cetirizine (ZyrTEC) 10 mg tablet; Take 1 tablet (10 mg total) by mouth daily    Depressive disorder    Gastroesophageal reflux disease without esophagitis  -     H  pylori antigen, stool; Future  -     omeprazole (PriLOSEC) 40 MG capsule; Take 1 capsule (40 mg total) by mouth daily          Subjective:      Patient ID: Alley Ferreira is a 39 y o  female  HPI  Alley Ferreira is a 39 y o  female with history of HTN, DM, Dallis Leventhal who present to the office for GERD follow up  Patient's date back she had GERD body year ago which was previously managed with famotidine however the last few weeks her symptoms have increased and is worse at night  She report reflux symptoms  Denies any red flags such as weight loss, night time awakening, difficulty swallowing  Reported taking her medication as prescribed, denies any other symptom    The following portions of the patient's history were reviewed and updated as appropriate: allergies, current medications, past family history, past medical history, past social history, past surgical history and problem list     Review of Systems   Constitutional: Negative for activity change, appetite change, chills, diaphoresis, fatigue, fever and unexpected weight change  HENT: Negative for congestion, dental problem and drooling  Respiratory: Negative for apnea, cough, choking, chest tightness, shortness of breath, wheezing and stridor  Cardiovascular: Negative for chest pain, palpitations and leg swelling  Gastrointestinal: Positive for abdominal pain  Negative for abdominal distention, blood in stool, constipation, diarrhea, nausea and vomiting  Genitourinary: Negative for difficulty urinating and dyspareunia  Musculoskeletal: Negative for arthralgias, back pain and gait problem  Neurological: Negative for dizziness and facial asymmetry     Psychiatric/Behavioral: Negative for agitation  Objective:      /84 (BP Location: Left arm, Patient Position: Sitting, Cuff Size: Large)   Pulse (!) 113   Temp 98 5 °F (36 9 °C) (Temporal)   Resp 16   Ht 5' 7" (1 702 m)   Wt (!) 139 kg (307 lb)   LMP 04/01/2022 (Approximate)   SpO2 97%   Breastfeeding No   BMI 48 08 kg/m²          Physical Exam  Constitutional:       General: She is not in acute distress  Appearance: She is well-developed  HENT:      Head: Normocephalic and atraumatic  Eyes:      General: No scleral icterus  Conjunctiva/sclera: Conjunctivae normal    Cardiovascular:      Rate and Rhythm: Normal rate  Heart sounds: Normal heart sounds  No murmur heard  Pulmonary:      Effort: Pulmonary effort is normal  No respiratory distress  Breath sounds: Normal breath sounds  Abdominal:      General: Bowel sounds are normal  There is no distension  Palpations: Abdomen is soft  Tenderness: There is abdominal tenderness (mild epigastric tenderness)  There is no guarding or rebound  Musculoskeletal:         General: No deformity  Normal range of motion  Cervical back: Neck supple  Skin:     General: Skin is warm and dry  Findings: No rash  Neurological:      Mental Status: She is alert and oriented to person, place, and time  Cranial Nerves: No cranial nerve deficit

## 2022-04-07 NOTE — PATIENT INSTRUCTIONS
GERD (Gastroesophageal Reflux Disease)   WHAT YOU NEED TO KNOW:   Gastroesophageal reflux disease (GERD) is reflux that happens more than 2 times a week for a few weeks  Reflux means acid and food in your stomach back up into your esophagus  GERD can cause other health problems over time if it is not treated  DISCHARGE INSTRUCTIONS:   Call your local emergency number (911 in the [address] [facility]) if:   · You have severe chest pain and sudden trouble breathing  Return to the emergency department if:   · You have trouble breathing after you vomit  · You have trouble swallowing, or pain with swallowing  · Your bowel movements are black, bloody, or tarry-looking  · Your vomit looks like coffee grounds or has blood in it  Call your doctor or gastroenterologist if:   · You feel full and cannot burp or vomit  · You vomit large amounts, or you vomit often  · You are losing weight without trying  · Your symptoms get worse or do not improve with treatment  · You have questions or concerns about your condition or care  Medicines:   · Medicines  are used to decrease stomach acid  Medicine may also be used to help your lower esophageal sphincter and stomach contract (tighten) more  · Take your medicine as directed  Contact your healthcare provider if you think your medicine is not helping or if you have side effects  Tell him of her if you are allergic to any medicine  Keep a list of the medicines, vitamins, and herbs you take  Include the amounts, and when and why you take them  Bring the list or the pill bottles to follow-up visits  Carry your medicine list with you in case of an emergency  Manage GERD:       · Do not have foods or drinks that may increase heartburn  These include chocolate, peppermint, fried or fatty foods, drinks that contain caffeine, or carbonated drinks (soda)  Other foods include spicy foods, onions, tomatoes, and tomato-based foods   Do not have foods or drinks that can irritate your esophagus, such as citrus fruits, juices, and alcohol  · Do not eat large meals  When you eat a lot of food at one time, your stomach needs more acid to digest it  Eat 6 small meals each day instead of 3 large ones, and eat slowly  Do not eat meals 2 to 3 hours before bedtime  · Elevate the head of your bed  Place 6-inch blocks under the head of your bed frame  You may also use more than one pillow under your head and shoulders while you sleep  · Maintain a healthy weight  If you are overweight, weight loss may help relieve symptoms of GERD  · Do not smoke  Smoking weakens the lower esophageal sphincter and increases the risk of GERD  Ask your healthcare provider for information if you currently smoke and need help to quit  E-cigarettes or smokeless tobacco still contain nicotine  Talk to your healthcare provider before you use these products  · Do not put pressure on your abdomen  Pressure pushes acid up into your esophagus  Do not wear clothing that is tight around your waist  Do not bend over  Bend at the knees if you need to pick something up  Follow up with your doctor or gastroenterologist as directed:  Write down your questions so you remember to ask them during your visits  © Copyright [employer] 2022 Information is for End User's use only and may not be sold, redistributed or otherwise used for commercial purposes  All illustrations and images included in CareNotes® are the copyrighted property of [employer]  or Reyna Vinson  The above information is an  only  It is not intended as medical advice for individual conditions or treatments  Talk to your doctor, nurse or pharmacist before following any medical regimen to see if it is safe and effective for you

## 2022-04-07 NOTE — ASSESSMENT & PLAN NOTE
Lab Results   Component Value Date    HGBA1C 6 8 (A) 04/07/2022   Well controlled however slightly worsen, A1C today is 6 8 from 6 7   Diet controlled     - Diet controlled discussed and patient has previously decreased it just with diet control   - Metformin 500 mg daily offered, however pt declines, patient still want to continue with lifestyle change first,   -will reassess in 3 months

## 2022-04-08 LAB — HIV 1+2 AB+HIV1 P24 AG SERPL QL IA: NORMAL

## 2022-04-13 PROBLEM — D64.89 OTHER SPECIFIED ANEMIAS: Status: ACTIVE | Noted: 2022-04-13

## 2022-05-17 ENCOUNTER — HOSPITAL ENCOUNTER (EMERGENCY)
Facility: HOSPITAL | Age: 37
Discharge: HOME | End: 2022-05-17
Attending: EMERGENCY MEDICINE
Payer: COMMERCIAL

## 2022-05-17 VITALS
HEART RATE: 112 BPM | DIASTOLIC BLOOD PRESSURE: 106 MMHG | RESPIRATION RATE: 18 BRPM | OXYGEN SATURATION: 99 % | SYSTOLIC BLOOD PRESSURE: 172 MMHG | TEMPERATURE: 98 F

## 2022-05-17 DIAGNOSIS — R10.9 ABDOMINAL PAIN: ICD-10-CM

## 2022-05-17 DIAGNOSIS — R03.0 ELEVATED BLOOD PRESSURE READING: ICD-10-CM

## 2022-05-17 DIAGNOSIS — E11.65 HYPERGLYCEMIA DUE TO DIABETES MELLITUS (HCC): ICD-10-CM

## 2022-05-17 DIAGNOSIS — R00.0 TACHYCARDIA: ICD-10-CM

## 2022-05-17 DIAGNOSIS — R11.2 NAUSEA AND VOMITING: Primary | ICD-10-CM

## 2022-05-17 LAB
ALBUMIN SERPL BCP-MCNC: 3.8 G/DL (ref 3.5–5)
ALP SERPL-CCNC: 84 U/L (ref 46–116)
ALT SERPL W P-5'-P-CCNC: 17 U/L (ref 12–78)
ANION GAP SERPL CALCULATED.3IONS-SCNC: 5 MMOL/L (ref 4–13)
AST SERPL W P-5'-P-CCNC: 10 U/L (ref 5–45)
ATRIAL RATE: 107 BPM
BACTERIA UR QL AUTO: ABNORMAL /HPF
BASE EX.OXY STD BLDV CALC-SCNC: 85.7 % (ref 60–80)
BASE EXCESS BLDV CALC-SCNC: -4.4 MMOL/L
BASOPHILS # BLD MANUAL: 0 THOUSAND/UL (ref 0–0.1)
BASOPHILS NFR MAR MANUAL: 0 % (ref 0–1)
BETA-HYDROXYBUTYRATE: 0.1 MMOL/L
BILIRUB SERPL-MCNC: 0.34 MG/DL (ref 0.2–1)
BILIRUB UR QL STRIP: NEGATIVE
BUN SERPL-MCNC: 13 MG/DL (ref 5–25)
CALCIUM SERPL-MCNC: 9.1 MG/DL (ref 8.3–10.1)
CHLORIDE SERPL-SCNC: 108 MMOL/L (ref 100–108)
CLARITY UR: CLEAR
CO2 SERPL-SCNC: 24 MMOL/L (ref 21–32)
COLOR UR: YELLOW
CREAT SERPL-MCNC: 0.98 MG/DL (ref 0.6–1.3)
EOSINOPHIL # BLD MANUAL: 0.41 THOUSAND/UL (ref 0–0.4)
EOSINOPHIL NFR BLD MANUAL: 4 % (ref 0–6)
ERYTHROCYTE [DISTWIDTH] IN BLOOD BY AUTOMATED COUNT: 12.6 % (ref 11.6–15.1)
EXT PREG TEST URINE: NEGATIVE
EXT. CONTROL ED NAV: NORMAL
GFR SERPL CREATININE-BSD FRML MDRD: 73 ML/MIN/1.73SQ M
GLUCOSE SERPL-MCNC: 172 MG/DL (ref 65–140)
GLUCOSE UR STRIP-MCNC: NEGATIVE MG/DL
HCO3 BLDV-SCNC: 20.2 MMOL/L (ref 24–30)
HCT VFR BLD AUTO: 35.3 % (ref 34.8–46.1)
HGB BLD-MCNC: 11.2 G/DL (ref 11.5–15.4)
HGB UR QL STRIP.AUTO: NEGATIVE
KETONES UR STRIP-MCNC: NEGATIVE MG/DL
LEUKOCYTE ESTERASE UR QL STRIP: NEGATIVE
LYMPHOCYTES # BLD AUTO: 2.28 THOUSAND/UL (ref 0.6–4.47)
LYMPHOCYTES # BLD AUTO: 22 % (ref 14–44)
MCH RBC QN AUTO: 27.1 PG (ref 26.8–34.3)
MCHC RBC AUTO-ENTMCNC: 31.7 G/DL (ref 31.4–37.4)
MCV RBC AUTO: 85 FL (ref 82–98)
MONOCYTES # BLD AUTO: 0.31 THOUSAND/UL (ref 0–1.22)
MONOCYTES NFR BLD: 3 % (ref 4–12)
MUCOUS THREADS UR QL AUTO: ABNORMAL
NEUTROPHILS # BLD MANUAL: 7.35 THOUSAND/UL (ref 1.85–7.62)
NEUTS BAND NFR BLD MANUAL: 2 % (ref 0–8)
NEUTS SEG NFR BLD AUTO: 69 % (ref 43–75)
NITRITE UR QL STRIP: NEGATIVE
NON-SQ EPI CELLS URNS QL MICRO: ABNORMAL /HPF
O2 CT BLDV-SCNC: 15.3 ML/DL
P AXIS: 67 DEGREES
PCO2 BLDV: 35.7 MM HG (ref 42–50)
PH BLDV: 7.37 [PH] (ref 7.3–7.4)
PH UR STRIP.AUTO: 5.5 [PH]
PLATELET # BLD AUTO: 367 THOUSANDS/UL (ref 149–390)
PLATELET BLD QL SMEAR: ADEQUATE
PMV BLD AUTO: 11.9 FL (ref 8.9–12.7)
PO2 BLDV: 60.5 MM HG (ref 35–45)
POTASSIUM SERPL-SCNC: 3.5 MMOL/L (ref 3.5–5.3)
PR INTERVAL: 130 MS
PROT SERPL-MCNC: 8.6 G/DL (ref 6.4–8.2)
PROT UR STRIP-MCNC: ABNORMAL MG/DL
QRS AXIS: 64 DEGREES
QRSD INTERVAL: 78 MS
QT INTERVAL: 328 MS
QTC INTERVAL: 437 MS
RBC # BLD AUTO: 4.14 MILLION/UL (ref 3.81–5.12)
RBC #/AREA URNS AUTO: ABNORMAL /HPF
SODIUM SERPL-SCNC: 137 MMOL/L (ref 136–145)
SP GR UR STRIP.AUTO: 1.02 (ref 1–1.03)
T WAVE AXIS: 45 DEGREES
UROBILINOGEN UR STRIP-ACNC: 2 MG/DL
VENTRICULAR RATE: 107 BPM
WBC # BLD AUTO: 10.35 THOUSAND/UL (ref 4.31–10.16)
WBC #/AREA URNS AUTO: ABNORMAL /HPF

## 2022-05-17 PROCEDURE — 85007 BL SMEAR W/DIFF WBC COUNT: CPT | Performed by: EMERGENCY MEDICINE

## 2022-05-17 PROCEDURE — 93010 ELECTROCARDIOGRAM REPORT: CPT | Performed by: INTERNAL MEDICINE

## 2022-05-17 PROCEDURE — 81001 URINALYSIS AUTO W/SCOPE: CPT | Performed by: EMERGENCY MEDICINE

## 2022-05-17 PROCEDURE — 82010 KETONE BODYS QUAN: CPT | Performed by: EMERGENCY MEDICINE

## 2022-05-17 PROCEDURE — 81025 URINE PREGNANCY TEST: CPT | Performed by: EMERGENCY MEDICINE

## 2022-05-17 PROCEDURE — 85027 COMPLETE CBC AUTOMATED: CPT | Performed by: EMERGENCY MEDICINE

## 2022-05-17 PROCEDURE — 96374 THER/PROPH/DIAG INJ IV PUSH: CPT

## 2022-05-17 PROCEDURE — 99284 EMERGENCY DEPT VISIT MOD MDM: CPT | Performed by: EMERGENCY MEDICINE

## 2022-05-17 PROCEDURE — 93005 ELECTROCARDIOGRAM TRACING: CPT

## 2022-05-17 PROCEDURE — 80053 COMPREHEN METABOLIC PANEL: CPT | Performed by: EMERGENCY MEDICINE

## 2022-05-17 PROCEDURE — 36415 COLL VENOUS BLD VENIPUNCTURE: CPT | Performed by: EMERGENCY MEDICINE

## 2022-05-17 PROCEDURE — 99284 EMERGENCY DEPT VISIT MOD MDM: CPT

## 2022-05-17 PROCEDURE — 82805 BLOOD GASES W/O2 SATURATION: CPT | Performed by: EMERGENCY MEDICINE

## 2022-05-17 RX ORDER — ONDANSETRON 4 MG/1
4 TABLET, ORALLY DISINTEGRATING ORAL EVERY 6 HOURS PRN
Qty: 6 TABLET | Refills: 0 | Status: SHIPPED | OUTPATIENT
Start: 2022-05-17

## 2022-05-17 RX ORDER — ONDANSETRON 2 MG/ML
4 INJECTION INTRAMUSCULAR; INTRAVENOUS ONCE
Status: COMPLETED | OUTPATIENT
Start: 2022-05-17 | End: 2022-05-17

## 2022-05-17 RX ADMIN — ONDANSETRON 4 MG: 2 INJECTION INTRAMUSCULAR; INTRAVENOUS at 07:49

## 2022-05-17 NOTE — ED ATTENDING ATTESTATION
5/17/2022  I, Nuvia Rajput MD, saw and evaluated the patient  I have discussed the patient with the resident/non-physician practitioner and agree with the resident's/non-physician practitioner's findings, Plan of Care, and MDM as documented in the resident's/non-physician practitioner's note, except where noted  All available labs and Radiology studies were reviewed  I was present for key portions of any procedure(s) performed by the resident/non-physician practitioner and I was immediately available to provide assistance  At this point I agree with the current assessment done in the Emergency Department  I have conducted an independent evaluation of this patient a history and physical is as follows:    ED Course         Critical Care Time  Procedures    38 yo female with hx of pseudotumor cerebri, dm, here for few days of nausea, worsening lower abdominal pain  No cp, no sob, no fever, no urinary symptoms  Pt with mild lower abdominal cramping  Vss, tachy,  afebrile, lungs cta, rrr, abdomen soft mild lower tenderness  Urine, urine preg, labs, ivf

## 2022-05-17 NOTE — DISCHARGE INSTRUCTIONS
You were evaluated in the emergency department for: nausea, vomiting, and abdominal cramping  You can access your current and pending results through Nat[facility]  A radiologist will take a second look at your X-Rays, if you had any, and you will be contacted with any new findings  You should follow-up with your primary care provider, as soon as possible, for re-evaluation  If you do not have a primary care provider, I have referred you to [address] [place] [address]  You will be contacted about scheduling an appointment  Their phone number is also included on this paperwork  You have also been referred to Ob/Gyn and you should follow up with them  Your workup revealed no emergent features at this time; however, many disease processes are dynamic:    Please, return to the emergency department if you experience new or worsening symptoms, fever, chest pain, shortness of breath, difficulty breathing, dizziness, abdominal pain, persistent nausea/vomiting, syncope or passing out, blood in your urine or stool, coughing up blood, leg swelling/pain, urinary retention, bowel or bladder incontinence, numbness between your legs  Additionally, your blood pressure was measured to be high  This is something that you should discuss with your primary care provider and have re-checked within one week

## 2022-05-17 NOTE — ED PROVIDER NOTES
History  Chief Complaint   Patient presents with    Nausea     Pt reports 4 days of nausea, worsening over the past day or so  No SOB, CP, or other symptoms noted by pt     Patient is a 79-year-old female, with a history significant for unmedicated non insulin-dependent diabetes mellitus type 2, bipolar to, pseudotumor cerebri, who presents to the ED today with a one-week history of intermittent but progressively worsening nausea  There is associated nonbloody emesis x1, sensation of dehydration, and gradual onset bilateral lower abdominal pain characterized as a mild intensity cramping sensation  There is no associated fever, chest pain, shortness of breath, diarrhea, vaginal discharge, dysuria, polyuria, vaginal bleeding, weakness, numbness, headache, vision change, dysphagia  Smells exacerbate her symptoms  No clear remitting factors  Patient states that her last menstrual period was 04/01/2022 and that she is normally regular  She has been sexually active since her last menstrual period and she has not used contraception  She has had four children in the past   Patient is without other concerns at this time     - No language barrier    - History obtained from patient  - There are no limitations to the history obtained  - Previous charting was reviewed          Prior to Admission Medications   Prescriptions Last Dose Informant Patient Reported? Taking?    Blood Pressure KIT   No No   Sig: by Does not apply route daily   Patient not taking: Reported on 7/15/2021   Vitamin E 100 units TABS   No No   Sig: Take 0 5 tablets (50 Units total) by mouth daily   acetaminophen (TYLENOL) 500 mg tablet   No No   Sig: Take 1 tablet (500 mg total) by mouth every 6 (six) hours as needed for mild pain   albuterol (PROVENTIL HFA,VENTOLIN HFA) 90 mcg/act inhaler   No No   Sig: Inhale 2 puffs every 6 (six) hours as needed for wheezing or shortness of breath   amLODIPine (NORVASC) 10 mg tablet   No No   Sig: Take 1 tablet (10 mg total) by mouth daily   ascorbic acid (VITAMIN C) 250 mg tablet   No No   Sig: Take 1 tablet (250 mg total) by mouth daily   atorvastatin (LIPITOR) 10 mg tablet   No No   Sig: TAKE 1 TABLET BY MOUTH ONCE DAILY  Patient not taking: Reported on 7/15/2021   cetirizine (ZyrTEC) 10 mg tablet   No No   Sig: Take 1 tablet (10 mg total) by mouth daily   ferrous sulfate 324 (65 Fe) mg   No No   Sig: Take 1 tablet (324 mg total) by mouth every other day   fluticasone (FLONASE) 50 mcg/act nasal spray   No No   Si spray into each nostril daily   ketotifen (ZADITOR) 0 025 % ophthalmic solution   No No   Sig: Administer 1 drop to both eyes 2 (two) times a day   Patient not taking: Reported on 2020   metFORMIN (GLUCOPHAGE-XR) 500 mg 24 hr tablet   No No   Sig: Take 1 tablet (500 mg total) by mouth daily with dinner   omeprazole (PriLOSEC) 40 MG capsule   No No   Sig: Take 1 capsule (40 mg total) by mouth daily   sertraline (ZOLOFT) 100 mg tablet   Yes No   Sig: Take 100 mg by mouth daily   Patient not taking: Reported on 7/15/2021   sertraline (ZOLOFT) 50 mg tablet   Yes No   Patient not taking: Reported on 2021    silver sulfadiazine (SILVADENE,SSD) 1 % cream   No No   Sig: Apply topically daily   Patient not taking: Reported on 2020      Facility-Administered Medications: None       Past Medical History:   Diagnosis Date    Pseudotumor cerebri        Past Surgical History:   Procedure Laterality Date    TONSILLECTOMY         No family history on file  I have reviewed and agree with the history as documented      E-Cigarette/Vaping    E-Cigarette Use Never User      E-Cigarette/Vaping Substances    Nicotine No     THC No     CBD No     Flavoring No     Other No     Unknown No      Social History     Tobacco Use    Smoking status: Former Smoker     Packs/day: 0 50     Types: Cigarettes    Smokeless tobacco: Never Used    Tobacco comment: pt quit 27 days ago   Vaping Use    Vaping Use: Never used   Substance Use Topics    Alcohol use: No    Drug use: No        Review of Systems   Constitutional: Negative for fever  HENT: Negative for trouble swallowing  Eyes: Negative for visual disturbance  Respiratory: Negative for shortness of breath  Cardiovascular: Negative for chest pain  Gastrointestinal: Positive for abdominal pain, nausea and vomiting  Negative for diarrhea  Endocrine: Negative for polyuria  Genitourinary: Negative for dysuria, vaginal bleeding and vaginal discharge  Musculoskeletal: Negative for gait problem  Skin: Negative for rash  Allergic/Immunologic: Positive for environmental allergies  Neurological: Negative for weakness, numbness and headaches  Hematological: Negative for adenopathy  Psychiatric/Behavioral: Negative for confusion  All other systems reviewed and are negative  Physical Exam  ED Triage Vitals   Temperature Pulse Respirations Blood Pressure SpO2   05/17/22 0707 05/17/22 0707 05/17/22 0707 05/17/22 0708 05/17/22 0707   98 °F (36 7 °C) (!) 112 18 (!) 172/106 99 %      Temp Source Heart Rate Source Patient Position - Orthostatic VS BP Location FiO2 (%)   05/17/22 0707 05/17/22 0707 05/17/22 0707 05/17/22 0707 --   Oral Monitor Lying Left arm       Pain Score       05/17/22 0707       5             Orthostatic Vital Signs  Vitals:    05/17/22 0707 05/17/22 0708   BP:  (!) 172/106   Pulse: (!) 112    Patient Position - Orthostatic VS: Lying        Physical Exam  Vitals and nursing note reviewed  Constitutional:       General: She is not in acute distress  Appearance: She is obese  She is not ill-appearing, toxic-appearing or diaphoretic  Comments: Patient appears comfortable during my evaluation    HENT:      Head: Normocephalic and atraumatic  Right Ear: External ear normal       Left Ear: External ear normal       Nose: Nose normal  No rhinorrhea        Mouth/Throat:      Mouth: Mucous membranes are moist  Pharynx: Oropharynx is clear  No oropharyngeal exudate or posterior oropharyngeal erythema  Eyes:      General: No scleral icterus  Right eye: No discharge  Left eye: No discharge  Conjunctiva/sclera: Conjunctivae normal       Pupils: Pupils are equal, round, and reactive to light  Neck:      Comments: Patient is spontaneously rotating their neck to the left and right during the history and physical exam interaction without difficulty or apparent discomfort    Cardiovascular:      Rate and Rhythm: Regular rhythm  Tachycardia present  Pulses: Normal pulses  Heart sounds: Normal heart sounds  No murmur heard  No friction rub  No gallop  Comments: 2+ Radial  Pulmonary:      Effort: Pulmonary effort is normal  No respiratory distress  Breath sounds: Normal breath sounds  No stridor  No wheezing, rhonchi or rales  Abdominal:      General: Abdomen is flat  There is no distension  Palpations: Abdomen is soft  Tenderness: There is abdominal tenderness (Bilateral lower abdominal)  There is no right CVA tenderness, left CVA tenderness, guarding or rebound  Musculoskeletal:         General: No deformity  Cervical back: Neck supple  No tenderness  No muscular tenderness  Right lower leg: No edema  Left lower leg: No edema  Lymphadenopathy:      Cervical: No cervical adenopathy  Skin:     General: Skin is warm and dry  Capillary Refill: Capillary refill takes less than 2 seconds  Comments: Acanthosis nigricans present   Neurological:      Mental Status: She is alert  Comments: Patient is speaking clearly in complete sentences  Patient is answering appropriately and able follow commands  Patient is moving all four extremities spontaneously  No facial droop  Tongue midline        Psychiatric:         Mood and Affect: Mood normal          Behavior: Behavior normal          ED Medications  Medications   ondansetron (ZOFRAN) injection 4 mg (4 mg Intravenous Given 5/17/22 0749)       Diagnostic Studies  Results Reviewed     Procedure Component Value Units Date/Time    Comprehensive metabolic panel [[id number]]  (Abnormal) Collected: 05/17/22 0749    Lab Status: Final result Specimen: Blood from Arm, Right Updated: 05/17/22 0844     Sodium 137 mmol/L      Potassium 3 5 mmol/L      Chloride 108 mmol/L      CO2 24 mmol/L      ANION GAP 5 mmol/L      BUN 13 mg/dL      Creatinine 0 98 mg/dL      Glucose 172 mg/dL      Calcium 9 1 mg/dL      AST 10 U/L      ALT 17 U/L      Alkaline Phosphatase 84 U/L      Total Protein 8 6 g/dL      Albumin 3 8 g/dL      Total Bilirubin 0 34 mg/dL      eGFR 73 ml/min/1 73sq m     Narrative:      [place] guidelines for Chronic Kidney Disease (CKD):     Stage 1 with normal or high GFR (GFR > 90 mL/min/1 73 square meters)    Stage 2 Mild CKD (GFR = 60-89 mL/min/1 73 square meters)    Stage 3A Moderate CKD (GFR = 45-59 mL/min/1 73 square meters)    Stage 3B Moderate CKD (GFR = 30-44 mL/min/1 73 square meters)    Stage 4 Severe CKD (GFR = 15-29 mL/min/1 73 square meters)    Stage 5 End Stage CKD (GFR <15 mL/min/1 73 square meters)  Note: GFR calculation is accurate only with a steady state creatinine    Beta Hydroxybutyrate [[id number]]  (Normal) Collected: 05/17/22 0749    Lab Status: Final result Specimen: Blood from Arm, Right Updated: 05/17/22 0835     BETA-HYDROXYBUTYRATE 0 1 mmol/L     Blood gas, venous [[id number]]  (Abnormal) Collected: 05/17/22 0749    Lab Status: Final result Specimen: Blood from Arm, Right Updated: 05/17/22 0833     pH, Ming 7 370     pCO2, Ming 35 7 mm Hg      pO2, Ming 60 5 mm Hg      HCO3, Ming 20 2 mmol/L      Base Excess, Ming -4 4 mmol/L      O2 Content, Ming 15 3 ml/dL      O2 HGB, VENOUS 85 7 %     CBC and differential [[id number]]  (Abnormal) Collected: 05/17/22 0749    Lab Status: Preliminary result Specimen: Blood from Arm, Right Updated: 05/17/22 0832     WBC 10 35 Thousand/uL      RBC 4 14 Million/uL      Hemoglobin 11 2 g/dL      Hematocrit 35 3 %      MCV 85 fL      MCH 27 1 pg      MCHC 31 7 g/dL      RDW 12 6 %      MPV 11 9 fL      Platelets 273 Thousands/uL     UA w Reflex to Microscopic w Reflex to Culture [[id number]]  (Abnormal) Collected: 05/17/22 0732    Lab Status: Final result Specimen: Urine, Clean Catch Updated: 05/17/22 0800     Color, UA Yellow     Clarity, UA Clear     Specific Gravity, UA 1 025     pH, UA 5 5     Leukocytes, UA Negative     Nitrite, UA Negative     Protein, UA Trace mg/dl      Glucose, UA Negative mg/dl      Ketones, UA Negative mg/dl      Urobilinogen, UA 2 0 mg/dl      Bilirubin, UA Negative     Blood, UA Negative    Urine Microscopic [[id number]]  (Abnormal) Collected: 05/17/22 0732    Lab Status: Final result Specimen: Urine, Clean Catch Updated: 05/17/22 0800     RBC, UA None Seen /hpf      WBC, UA 1-2 /hpf      Epithelial Cells Occasional /hpf      Bacteria, UA None Seen /hpf      MUCUS THREADS Occasional    POCT pregnancy, urine [[id number]]  (Normal) Resulted: 05/17/22 0732    Lab Status: Final result Updated: 05/17/22 0732     EXT PREG TEST UR (Ref: Negative) negative     Control valid                 No orders to display         Procedures  Procedures      ED Course  ED Course as of 05/17/22 0914   Tue May 17, 2022   0736 PREGNANCY TEST URINE: negative   0827 Bacteria, UA: None Seen   0827 Leukocytes, UA: Negative   0827 Nitrite, UA: Negative   0903 Patient states that her symptoms are mostly resolved  She states that she has both a PCP and OB Gyn in that she will follow-up with them  She has neither questions nor concerns after receiving discharge instructions and return precautions  She feels well of to go home  9950 Results reviewed with patient    0905 ECG: Tachycardia rate, regular rhythm, normal axis, no ectopy, no ST elevations    0913 Patient's abdomen remains soft and without rebound or guarding on repeat examination  Pain is predominantly suprapubic                              SBIRT 22yo+    Flowsheet Row Most Recent Value   SBIRT (23 yo +)    In order to provide better care to our patients, we are screening all of our patients for alcohol and drug use  Would it be okay to ask you these screening questions? Yes Filed at: 05/17/2022 9789   Initial Alcohol Screen: US AUDIT-C     1  How often do you have a drink containing alcohol? 0 Filed at: 05/17/2022 0712   2  How many drinks containing alcohol do you have on a typical day you are drinking? 0 Filed at: 05/17/2022 0712   3a  Male UNDER 65: How often do you have five or more drinks on one occasion? 0 Filed at: 05/17/2022 0712   3b  FEMALE Any Age, or MALE 65+: How often do you have 4 or more drinks on one occassion? 0 Filed at: 05/17/2022 0020   Audit-C Score 0 Filed at: 05/17/2022 1357   CLARIBEL: How many times in the past year have you    Used an illegal drug or used a prescription medication for non-medical reasons? Never Filed at: 05/17/2022 0712                MDM  Number of Diagnoses or Management Options  Abdominal pain  Elevated blood pressure reading  Hyperglycemia due to diabetes mellitus (HCC)  Nausea and vomiting  Tachycardia  Diagnosis management comments: Patient is a 58-year-old female, with a history significant for unmedicated non insulin-dependent diabetes mellitus type 2, bipolar to, pseudotumor cerebri, who presents to the ED today with a one-week history of intermittent but progressively worsening nausea  There is associated nonbloody emesis x1, sensation of dehydration, and gradual onset bilateral lower abdominal pain characterized as a mild intensity cramping sensation  There is no associated fever, chest pain, shortness of breath, diarrhea, vaginal discharge, dysuria, polyuria, vaginal bleeding, weakness, numbness, headache, vision change, dysphagia  Patient states that her last menstrual period was 04/01/2022 and that she is normally regular    She has been sexually active since her last menstrual period and she has not used contraception  Patient is currently afebrile, tachycardic, otherwise hemodynamically stable  Notably, on review of the patient's medical record, she is consistently tachycardic during office and ED visits  Patient's physical exam is notable for bilateral lower abdominal tenderness without rebound or guarding  Her exam is otherwise unremarkable  This presentation is concerning for:  Pregnancy, UTI, DKA  Low clinical suspicion for pseudotumor cerebri, pancreatitis, ovarian pathology, appendicitis at this time based upon history and physical exam   Will investigate with CBC, CMP, VBG, beta hydroxybutyrate, UA, urine pregnancy, ECG further based upon workup  Will manage based upon results  Disposition  Final diagnoses:   Nausea and vomiting   Abdominal pain   Elevated blood pressure reading   Hyperglycemia due to diabetes mellitus (HCC)   Tachycardia     Time reflects when diagnosis was documented in both MDM as applicable and the Disposition within this note     Time User Action Codes Description Comment    5/17/2022  8:55 AM Diana Jones  A Add [R11 2] Nausea and vomiting     5/17/2022  8:56 AM Diana Jones  A Add [R10 9] Abdominal pain     5/17/2022  8:56 AM LegDiana boyd  A Add [R03 0] Elevated blood pressure reading     5/17/2022  8:56 AM Diana Vela  A Add [E11 65] Hyperglycemia due to diabetes mellitus ([facility] [address] )     5/17/2022  8:56 AM Stehpanie Chung Add [R00 0] Tachycardia       ED Disposition     ED Disposition   Discharge    Condition   Stable    Date/Time   Tue May 17, 2022  9:04 AM    Comment   Poli Estrada discharge to home/self care                 Follow-up Information     Follow up With Specialties Details Why Contact Info Additional [address] [facility] Schedule an appointment as soon as possible for a visit   [address], [address] [address] [facility], [address], [address] [place], [place], [facility] [address] [facility] Ob[facility]etrics and Gynecology Schedule an appointment as soon as possible for a visit   [address] [address]  [address] [place] [address], [address], [address] [place] [address], [id number] [phone]          Patient's Medications   Discharge Prescriptions    ONDANSETRON (ZOFRAN ODT) 4 MG DISINTEGRATING TABLET    Take 1 tablet (4 mg total) by mouth every 6 (six) hours as needed for nausea or vomiting for up to 6 doses       Start Date: 5/17/2022 End Date: --       Order Dose: 4 mg       Quantity: 6 tablet    Refills: 0         PDMP Review       Value Time User    PDMP Reviewed  Yes 4/30/2020  3:56 PM Hair Diaz MD           ED Provider  Attending physically available and evaluated Del Mg  I managed the patient along with the ED Attending      Electronically Signed by         Brandon Ruiz MD  05/17/22 7869

## 2022-05-19 ENCOUNTER — OFFICE VISIT (OUTPATIENT)
Dept: FAMILY MEDICINE CLINIC | Facility: CLINIC | Age: 37
End: 2022-05-19

## 2022-05-19 VITALS
HEART RATE: 100 BPM | TEMPERATURE: 97.8 F | BODY MASS INDEX: 45.99 KG/M2 | WEIGHT: 293 LBS | RESPIRATION RATE: 18 BRPM | OXYGEN SATURATION: 99 % | DIASTOLIC BLOOD PRESSURE: 88 MMHG | HEIGHT: 67 IN | SYSTOLIC BLOOD PRESSURE: 134 MMHG

## 2022-05-19 DIAGNOSIS — N92.6 IRREGULAR MENSES: ICD-10-CM

## 2022-05-19 DIAGNOSIS — E78.2 MIXED HYPERLIPIDEMIA: ICD-10-CM

## 2022-05-19 DIAGNOSIS — K21.9 GASTROESOPHAGEAL REFLUX DISEASE WITHOUT ESOPHAGITIS: Primary | ICD-10-CM

## 2022-05-19 PROCEDURE — 99213 OFFICE O/P EST LOW 20 MIN: CPT | Performed by: INTERNAL MEDICINE

## 2022-05-19 RX ORDER — OMEPRAZOLE 20 MG/1
40 CAPSULE, DELAYED RELEASE ORAL DAILY
Qty: 30 CAPSULE | Refills: 1 | Status: SHIPPED | OUTPATIENT
Start: 2022-05-19 | End: 2022-06-28

## 2022-05-19 RX ORDER — ATORVASTATIN CALCIUM 10 MG/1
10 TABLET, FILM COATED ORAL DAILY
Qty: 30 TABLET | Refills: 3 | Status: SHIPPED | OUTPATIENT
Start: 2022-05-19

## 2022-05-19 RX ORDER — OMEPRAZOLE 20 MG/1
20 CAPSULE, DELAYED RELEASE ORAL DAILY
Qty: 30 CAPSULE | Refills: 1 | Status: SHIPPED | OUTPATIENT
Start: 2022-05-19 | End: 2022-05-19

## 2022-05-19 NOTE — ASSESSMENT & PLAN NOTE
Patient with regular menses usually however in April cycle began on April 1st and then again on April 23rd  This is associated with cramping in the lower abdomen that is resolving  No current bleeding  Pregnancy test negative multiple times including during ED visit on 5/17/2022  - She is scheduled to see OBGYN in 1 week and will continue to monitor until then

## 2022-05-19 NOTE — PROGRESS NOTES
Assessment/Plan:    Gastroesophageal reflux disease without esophagitis  >1 year duration; Famotidine previously used not helping anymore  Patient has been trying to avoid triggers such as specific foods  Patient unable to complete stool antigen testing and prefers to do urea breath test for H  Pylori  - Urea breath test ordered  Patient instructed to not use PPI 14 days prior   - Once completed, patient may start PPI that is prescribed today  - Wedge pillow recommended  Irregular menses  Patient with regular menses usually however in April cycle began on April 1st and then again on April 23rd  This is associated with cramping in the lower abdomen that is resolving  No current bleeding  Pregnancy test negative multiple times including during ED visit on 5/17/2022  - She is scheduled to see OBGYN in 1 week and will continue to monitor until then  Diagnoses and all orders for this visit:    Gastroesophageal reflux disease without esophagitis  -     H  pylori breath test; Future  -     Discontinue: omeprazole (PriLOSEC) 20 mg delayed release capsule; Take 1 capsule (20 mg total) by mouth in the morning   -     omeprazole (PriLOSEC) 20 mg delayed release capsule; Take 2 capsules (40 mg total) by mouth in the morning  Mixed hyperlipidemia  -     atorvastatin (LIPITOR) 10 mg tablet; Take 1 tablet (10 mg total) by mouth in the morning  Irregular menses          Subjective:      Patient ID: Jonel Schmitt is a 39 y o  female  Patient is a very pleasant 58-year-old female who presents to the clinic for follow-up on GERD  Patient states that she is having recurrent symptoms of epigastric abdominal pain with reflux to the back of her throat  She states that this is worse at night when laying flat  She notices some foods trigger the symptoms however can be spontaneous at times  She did notice that spaghetti you definitely cause it however she also notes that with different fruits and vegetables  She denies any nausea or vomiting  She is unable to complete H pylori stool antigen testing and prefers another method of testing  She also states that she has irregular menses  In April she had 2 periods, 1 on April 1st and the 2nd 1 on April 23rd  This is abnormal for the patient as she has always had regular periods that occur every 28 days  This is associated with intermittent pelvic cramping however no current discharge or bleeding  She does have an appointment with OBGYN next week  She did check pregnancy test at home multiple times and has been negative  The following portions of the patient's history were reviewed and updated as appropriate:   She  has a past medical history of Pseudotumor cerebri  She   Patient Active Problem List    Diagnosis Date Noted    Irregular menses 05/19/2022    Other specified anemias 04/13/2022    Pregnancy at early stage 07/15/2021    Dental abscess 10/23/2020    Anxiety 08/07/2020    Type 2 diabetes mellitus without complication, without long-term current use of insulin ([facility] ) 06/08/2020    Hypokalemia 06/08/2020    Mixed hyperlipidemia 06/08/2020    Seasonal allergic rhinitis due to pollen 06/04/2020    Adjustment disorder with mixed anxiety and depressed mood 03/05/2020    Gastroesophageal reflux disease without esophagitis 07/26/2018    Mild intermittent asthma without complication 10/57/3156    Benign essential HTN 07/17/2015    Depressive disorder 07/17/2015    Class 3 severe obesity due to excess calories with serious comorbidity and body mass index (BMI) of 45 0 to 49 9 in adult ([facility] [address] ) 07/17/2015     She  has a past surgical history that includes Tonsillectomy  Her family history is not on file  She  reports that she has quit smoking  Her smoking use included cigarettes  She smoked 0 50 packs per day  She has never used smokeless tobacco  She reports that she does not drink alcohol and does not use drugs    Current Outpatient Medications Medication Sig Dispense Refill    atorvastatin (LIPITOR) 10 mg tablet Take 1 tablet (10 mg total) by mouth in the morning  30 tablet 3    omeprazole (PriLOSEC) 20 mg delayed release capsule Take 2 capsules (40 mg total) by mouth in the morning   30 capsule 1    acetaminophen (TYLENOL) 500 mg tablet Take 1 tablet (500 mg total) by mouth every 6 (six) hours as needed for mild pain 30 tablet 0    albuterol (PROVENTIL HFA,VENTOLIN HFA) 90 mcg/act inhaler Inhale 2 puffs every 6 (six) hours as needed for wheezing or shortness of breath 6 7 g 0    amLODIPine (NORVASC) 10 mg tablet Take 1 tablet (10 mg total) by mouth daily 90 tablet 1    ascorbic acid (VITAMIN C) 250 mg tablet Take 1 tablet (250 mg total) by mouth daily 90 tablet 1    Blood Pressure KIT by Does not apply route daily (Patient not taking: Reported on 7/15/2021) 1 each 0    cetirizine (ZyrTEC) 10 mg tablet Take 1 tablet (10 mg total) by mouth daily 30 tablet 3    ferrous sulfate 324 (65 Fe) mg Take 1 tablet (324 mg total) by mouth every other day 60 tablet 1    fluticasone (FLONASE) 50 mcg/act nasal spray 1 spray into each nostril daily 15 8 mL 1    ketotifen (ZADITOR) 0 025 % ophthalmic solution Administer 1 drop to both eyes 2 (two) times a day (Patient not taking: Reported on 12/21/2020) 5 mL 4    metFORMIN (GLUCOPHAGE-XR) 500 mg 24 hr tablet Take 1 tablet (500 mg total) by mouth daily with dinner 60 tablet 1    ondansetron (Zofran ODT) 4 mg disintegrating tablet Take 1 tablet (4 mg total) by mouth every 6 (six) hours as needed for nausea or vomiting for up to 6 doses 6 tablet 0    sertraline (ZOLOFT) 100 mg tablet Take 100 mg by mouth daily (Patient not taking: Reported on 7/15/2021)      sertraline (ZOLOFT) 50 mg tablet  (Patient not taking: Reported on 11/11/2021 )      silver sulfadiazine (SILVADENE,SSD) 1 % cream Apply topically daily (Patient not taking: Reported on 12/21/2020) 50 g 0    Vitamin E 100 units TABS Take 0 5 tablets (50 Units total) by mouth daily 30 tablet 0     No current facility-administered medications for this visit  Current Outpatient Medications on File Prior to Visit   Medication Sig    acetaminophen (TYLENOL) 500 mg tablet Take 1 tablet (500 mg total) by mouth every 6 (six) hours as needed for mild pain    albuterol (PROVENTIL HFA,VENTOLIN HFA) 90 mcg/act inhaler Inhale 2 puffs every 6 (six) hours as needed for wheezing or shortness of breath    amLODIPine (NORVASC) 10 mg tablet Take 1 tablet (10 mg total) by mouth daily    ascorbic acid (VITAMIN C) 250 mg tablet Take 1 tablet (250 mg total) by mouth daily    Blood Pressure KIT by Does not apply route daily (Patient not taking: Reported on 7/15/2021)    cetirizine (ZyrTEC) 10 mg tablet Take 1 tablet (10 mg total) by mouth daily    ferrous sulfate 324 (65 Fe) mg Take 1 tablet (324 mg total) by mouth every other day    fluticasone (FLONASE) 50 mcg/act nasal spray 1 spray into each nostril daily    ketotifen (ZADITOR) 0 025 % ophthalmic solution Administer 1 drop to both eyes 2 (two) times a day (Patient not taking: Reported on 12/21/2020)    metFORMIN (GLUCOPHAGE-XR) 500 mg 24 hr tablet Take 1 tablet (500 mg total) by mouth daily with dinner    ondansetron (Zofran ODT) 4 mg disintegrating tablet Take 1 tablet (4 mg total) by mouth every 6 (six) hours as needed for nausea or vomiting for up to 6 doses    sertraline (ZOLOFT) 100 mg tablet Take 100 mg by mouth daily (Patient not taking: Reported on 7/15/2021)    sertraline (ZOLOFT) 50 mg tablet  (Patient not taking: Reported on 11/11/2021 )    silver sulfadiazine (SILVADENE,SSD) 1 % cream Apply topically daily (Patient not taking: Reported on 12/21/2020)    Vitamin E 100 units TABS Take 0 5 tablets (50 Units total) by mouth daily    [DISCONTINUED] atorvastatin (LIPITOR) 10 mg tablet TAKE 1 TABLET BY MOUTH ONCE DAILY   (Patient not taking: Reported on 7/15/2021)    [DISCONTINUED] omeprazole (PriLOSEC) 40 MG capsule Take 1 capsule (40 mg total) by mouth daily     No current facility-administered medications on file prior to visit  She is allergic to bee venom, nabumetone, orange syrup, prochlorperazine, chlorpheniramine, codeine, diphenhydramine, pseudoephedrine, and tetanus toxoid       Review of Systems   Constitutional: Negative for activity change, appetite change and fever  HENT: Negative for congestion and sore throat  Eyes: Negative for visual disturbance  Respiratory: Negative for cough, shortness of breath and wheezing  Cardiovascular: Negative for chest pain and palpitations  Gastrointestinal: Positive for abdominal pain (GERD)  Negative for abdominal distention, constipation, diarrhea and vomiting  Genitourinary: Positive for menstrual problem and pelvic pain  Negative for dysuria, flank pain, frequency, urgency, vaginal bleeding, vaginal discharge and vaginal pain  Musculoskeletal: Negative for arthralgias  Skin: Negative for rash  Neurological: Negative for headaches  Objective:      /88   Pulse 100   Temp 97 8 °F (36 6 °C) (Temporal)   Resp 18   Ht 5' 7" (1 702 m)   Wt (!) 137 kg (302 lb)   LMP 04/23/2022   SpO2 99%   BMI 47 30 kg/m²          Physical Exam  Constitutional:       General: She is not in acute distress  Appearance: Normal appearance  She is well-developed  She is obese  She is not ill-appearing, toxic-appearing or diaphoretic  HENT:      Head: Normocephalic and atraumatic  Right Ear: Tympanic membrane, ear canal and external ear normal  There is no impacted cerumen  Left Ear: Tympanic membrane, ear canal and external ear normal  There is no impacted cerumen  Nose: Nose normal  No congestion  Mouth/Throat:      Mouth: Mucous membranes are moist       Pharynx: Oropharynx is clear  No oropharyngeal exudate  Eyes:      General: No scleral icterus  Right eye: No discharge  Left eye: No discharge  Extraocular Movements: Extraocular movements intact  Conjunctiva/sclera: Conjunctivae normal       Pupils: Pupils are equal, round, and reactive to light  Neck:      Thyroid: No thyromegaly  Cardiovascular:      Rate and Rhythm: Normal rate and regular rhythm  Pulses: Normal pulses  Heart sounds: Normal heart sounds  No murmur heard  No gallop  Pulmonary:      Effort: Pulmonary effort is normal  No respiratory distress  Breath sounds: Normal breath sounds  No wheezing  Abdominal:      General: Bowel sounds are normal  There is no distension  Palpations: Abdomen is soft  There is no mass  Tenderness: There is abdominal tenderness (mild with deep palpation of the epigastric region)  There is no right CVA tenderness, left CVA tenderness, guarding or rebound  Hernia: No hernia is present  Musculoskeletal:      Cervical back: Normal range of motion and neck supple  No rigidity  No muscular tenderness  Right lower leg: No edema  Left lower leg: No edema  Lymphadenopathy:      Cervical: No cervical adenopathy  Skin:     General: Skin is warm  Findings: No erythema or rash  Neurological:      General: No focal deficit present  Mental Status: She is alert     Psychiatric:         Mood and Affect: Mood normal

## 2022-05-19 NOTE — PATIENT INSTRUCTIONS
GERD (Gastroesophageal Reflux Disease)   WHAT YOU NEED TO KNOW:   Gastroesophageal reflux disease (GERD) is reflux that happens more than 2 times a week for a few weeks  Reflux means acid and food in your stomach back up into your esophagus  GERD can cause other health problems over time if it is not treated  DISCHARGE INSTRUCTIONS:   Call your local emergency number (911 in the [address] [facility]) if:   You have severe chest pain and sudden trouble breathing  Return to the emergency department if:   You have trouble breathing after you vomit  You have trouble swallowing, or pain with swallowing  Your bowel movements are black, bloody, or tarry-looking  Your vomit looks like coffee grounds or has blood in it  Call your doctor or gastroenterologist if:   You feel full and cannot burp or vomit  You vomit large amounts, or you vomit often  You are losing weight without trying  Your symptoms get worse or do not improve with treatment  You have questions or concerns about your condition or care  Medicines:   Medicines  are used to decrease stomach acid  Medicine may also be used to help your lower esophageal sphincter and stomach contract (tighten) more  Take your medicine as directed  Contact your healthcare provider if you think your medicine is not helping or if you have side effects  Tell him of her if you are allergic to any medicine  Keep a list of the medicines, vitamins, and herbs you take  Include the amounts, and when and why you take them  Bring the list or the pill bottles to follow-up visits  Carry your medicine list with you in case of an emergency  Manage GERD:       Do not have foods or drinks that may increase heartburn  These include chocolate, peppermint, fried or fatty foods, drinks that contain caffeine, or carbonated drinks (soda)  Other foods include spicy foods, onions, tomatoes, and tomato-based foods   Do not have foods or drinks that can irritate your esophagus, such as citrus fruits, juices, and alcohol  Do not eat large meals  When you eat a lot of food at one time, your stomach needs more acid to digest it  Eat 6 small meals each day instead of 3 large ones, and eat slowly  Do not eat meals 2 to 3 hours before bedtime  Elevate the head of your bed  Place 6-inch blocks under the head of your bed frame  You may also use more than one pillow under your head and shoulders while you sleep  Maintain a healthy weight  If you are overweight, weight loss may help relieve symptoms of GERD  Do not smoke  Smoking weakens the lower esophageal sphincter and increases the risk of GERD  Ask your healthcare provider for information if you currently smoke and need help to quit  E-cigarettes or smokeless tobacco still contain nicotine  Talk to your healthcare provider before you use these products  Do not put pressure on your abdomen  Pressure pushes acid up into your esophagus  Do not wear clothing that is tight around your waist  Do not bend over  Bend at the knees if you need to pick something up  Follow up with your doctor or gastroenterologist as directed:  Write down your questions so you remember to ask them during your visits  © Copyright [employer] 2022 Information is for End User's use only and may not be sold, redistributed or otherwise used for commercial purposes  All illustrations and images included in CareNotes® are the copyrighted property of [employer]  or Reyna Vinson  The above information is an  only  It is not intended as medical advice for individual conditions or treatments  Talk to your doctor, nurse or pharmacist before following any medical regimen to see if it is safe and effective for you

## 2022-05-19 NOTE — ASSESSMENT & PLAN NOTE
>1 year duration; Famotidine previously used not helping anymore  Patient has been trying to avoid triggers such as specific foods  Patient unable to complete stool antigen testing and prefers to do urea breath test for H  Pylori  - Urea breath test ordered  Patient instructed to not use PPI 14 days prior   - Once completed, patient may start PPI that is prescribed today  - Wedge pillow recommended

## 2022-06-20 ENCOUNTER — TELEMEDICINE (OUTPATIENT)
Dept: FAMILY MEDICINE CLINIC | Facility: CLINIC | Age: 37
End: 2022-06-20

## 2022-06-20 DIAGNOSIS — K21.9 GASTROESOPHAGEAL REFLUX DISEASE WITHOUT ESOPHAGITIS: Primary | ICD-10-CM

## 2022-06-20 DIAGNOSIS — F41.9 ANXIETY: ICD-10-CM

## 2022-06-20 DIAGNOSIS — I10 BENIGN ESSENTIAL HTN: ICD-10-CM

## 2022-06-20 PROCEDURE — G2012 BRIEF CHECK IN BY MD/QHP: HCPCS | Performed by: INTERNAL MEDICINE

## 2022-06-20 NOTE — PROGRESS NOTES
Virtual Brief Visit    Patient is located in the following state in which I hold an active license [place]      Assessment/Plan:    Problem List Items Addressed This Visit        Digestive    Gastroesophageal reflux disease without esophagitis - Primary     >1 year duration; Famotidine previously used not helping anymore  Patient has been trying to avoid triggers such as specific foods  Patient unable to complete stool antigen testing and prefers to do urea breath test for H  Pylori  - Urea breath test ordered to be completed at [facility] before 3 PM  Patient instructed to not use PPI 14 days prior   - Once completed, patient may start PPI that is prescribed today  - Wedge pillow recommended  Cardiovascular and Mediastinum    Benign essential HTN     Previously on Amlodipine however not taking;  BP at last office visit was normal   - Recommended to check BP at home and keep log  Other    Anxiety     With likely PTSD after mother's and sister's death; No SI/HI; Previously talked to therapist however no longer speaking to one  This weekend, PTSD and anxiety symptoms increased as she was thinking about her sister  Felt better after speaking to me about it  Currently on Zoloft 50 mg daily   - Made myself available to speak to patient  - Increase Sertraline to 100 mg daily   - Patient agreeable to plan  Subjective:    HPI:  Patient is a very pleasant 62-year-old female who presents to the Clinic virtually for follow-up on GERD  Patient states that she takes PPI p r n  And currently waiting to do H pylori urea breath test   She was awaiting confirmation of which lab to perform test that  She otherwise feels okay however recently with increased anxiety and symptoms of PTSD secondary to sister passing away    Due to father's day this weekend, the reality of it has been present and she has been feeling like her sister may still be alive be although she knows that her sister has passed away  She denies any suicidal or homicidal ideations and feels well 1 speaks to myself about the situation  Does not currently follow with a therapist or psychiatrist but does take sertraline 50 mg daily  ROS: Negative except for above mentioned  Objective:    Physical Exam: Unable to perform due to virtual visit  Recent Visits  No visits were found meeting these conditions  Showing recent visits within past 7 days and meeting all other requirements  Today's Visits  Date Type Provider Dept   06/20/22 Telemedicine Macey Lugo MD  [facility]   Showing today's visits and meeting all other requirements  Future Appointments  No visits were found meeting these conditions    Showing future appointments within next 150 days and meeting all other requirements         I spent 20 minutes directly with the patient during this visit

## 2022-06-20 NOTE — ASSESSMENT & PLAN NOTE
>1 year duration; Famotidine previously used not helping anymore  Patient has been trying to avoid triggers such as specific foods  Patient unable to complete stool antigen testing and prefers to do urea breath test for H  Pylori  - Urea breath test ordered to be completed at [facility] before 3 PM  Patient instructed to not use PPI 14 days prior   - Once completed, patient may start PPI that is prescribed today  - Wedge pillow recommended

## 2022-06-20 NOTE — ASSESSMENT & PLAN NOTE
With likely PTSD after mother's and sister's death; No SI/HI; Previously talked to therapist however no longer speaking to one  This weekend, PTSD and anxiety symptoms increased as she was thinking about her sister  Felt better after speaking to me about it  Currently on Zoloft 50 mg daily   - Made myself available to speak to patient  - Increase Sertraline to 100 mg daily   - Patient agreeable to plan

## 2022-06-20 NOTE — ASSESSMENT & PLAN NOTE
Previously on Amlodipine however not taking;  BP at last office visit was normal   - Recommended to check BP at home and keep log

## 2022-06-24 ENCOUNTER — APPOINTMENT (OUTPATIENT)
Dept: LAB | Facility: HOSPITAL | Age: 37
End: 2022-06-24
Payer: COMMERCIAL

## 2022-06-24 DIAGNOSIS — K21.9 GASTROESOPHAGEAL REFLUX DISEASE WITHOUT ESOPHAGITIS: ICD-10-CM

## 2022-06-24 DIAGNOSIS — D64.89 ANEMIA DUE TO OTHER CAUSE, NOT CLASSIFIED: ICD-10-CM

## 2022-06-24 LAB
BASOPHILS # BLD AUTO: 0.05 THOUSANDS/ΜL (ref 0–0.1)
BASOPHILS NFR BLD AUTO: 1 % (ref 0–1)
CHOLEST SERPL-MCNC: 222 MG/DL
EOSINOPHIL # BLD AUTO: 0.08 THOUSAND/ΜL (ref 0–0.61)
EOSINOPHIL NFR BLD AUTO: 1 % (ref 0–6)
ERYTHROCYTE [DISTWIDTH] IN BLOOD BY AUTOMATED COUNT: 12.8 % (ref 11.6–15.1)
FERRITIN SERPL-MCNC: 41 NG/ML (ref 8–388)
FOLATE SERPL-MCNC: 8.6 NG/ML (ref 3.1–17.5)
HCT VFR BLD AUTO: 35.6 % (ref 34.8–46.1)
HDLC SERPL-MCNC: 42 MG/DL
HGB BLD-MCNC: 11 G/DL (ref 11.5–15.4)
IMM GRANULOCYTES # BLD AUTO: 0.06 THOUSAND/UL (ref 0–0.2)
IMM GRANULOCYTES NFR BLD AUTO: 1 % (ref 0–2)
IRON SATN MFR SERPL: 13 % (ref 15–50)
IRON SERPL-MCNC: 38 UG/DL (ref 50–170)
LDLC SERPL CALC-MCNC: 160 MG/DL (ref 0–100)
LYMPHOCYTES # BLD AUTO: 2.83 THOUSANDS/ΜL (ref 0.6–4.47)
LYMPHOCYTES NFR BLD AUTO: 31 % (ref 14–44)
MCH RBC QN AUTO: 26.6 PG (ref 26.8–34.3)
MCHC RBC AUTO-ENTMCNC: 30.9 G/DL (ref 31.4–37.4)
MCV RBC AUTO: 86 FL (ref 82–98)
MONOCYTES # BLD AUTO: 0.34 THOUSAND/ΜL (ref 0.17–1.22)
MONOCYTES NFR BLD AUTO: 4 % (ref 4–12)
NEUTROPHILS # BLD AUTO: 5.72 THOUSANDS/ΜL (ref 1.85–7.62)
NEUTS SEG NFR BLD AUTO: 62 % (ref 43–75)
NRBC BLD AUTO-RTO: 0 /100 WBCS
PLATELET # BLD AUTO: 318 THOUSANDS/UL (ref 149–390)
PMV BLD AUTO: 11.8 FL (ref 8.9–12.7)
RBC # BLD AUTO: 4.13 MILLION/UL (ref 3.81–5.12)
TIBC SERPL-MCNC: 296 UG/DL (ref 250–450)
TRIGL SERPL-MCNC: 98 MG/DL
VIT B12 SERPL-MCNC: 293 PG/ML (ref 100–900)
WBC # BLD AUTO: 9.08 THOUSAND/UL (ref 4.31–10.16)

## 2022-06-24 PROCEDURE — 82728 ASSAY OF FERRITIN: CPT

## 2022-06-24 PROCEDURE — 83918 ORGANIC ACIDS TOTAL QUANT: CPT

## 2022-06-24 PROCEDURE — 85025 COMPLETE CBC W/AUTO DIFF WBC: CPT

## 2022-06-24 PROCEDURE — 80061 LIPID PANEL: CPT

## 2022-06-24 PROCEDURE — 82746 ASSAY OF FOLIC ACID SERUM: CPT

## 2022-06-24 PROCEDURE — 83013 H PYLORI (C-13) BREATH: CPT

## 2022-06-24 PROCEDURE — 82607 VITAMIN B-12: CPT

## 2022-06-24 PROCEDURE — 83540 ASSAY OF IRON: CPT

## 2022-06-24 PROCEDURE — 83014 H PYLORI DRUG ADMIN: CPT

## 2022-06-24 PROCEDURE — 36415 COLL VENOUS BLD VENIPUNCTURE: CPT

## 2022-06-24 PROCEDURE — 83550 IRON BINDING TEST: CPT

## 2022-06-27 LAB — SCAN RESULT: NORMAL

## 2022-06-28 ENCOUNTER — TELEPHONE (OUTPATIENT)
Dept: FAMILY MEDICINE CLINIC | Facility: CLINIC | Age: 37
End: 2022-06-28

## 2022-06-28 DIAGNOSIS — D50.9 IRON DEFICIENCY ANEMIA, UNSPECIFIED IRON DEFICIENCY ANEMIA TYPE: ICD-10-CM

## 2022-06-28 DIAGNOSIS — A04.8 H. PYLORI INFECTION: Primary | ICD-10-CM

## 2022-06-28 PROBLEM — Z34.90 PREGNANCY AT EARLY STAGE: Status: RESOLVED | Noted: 2021-07-15 | Resolved: 2022-06-28

## 2022-06-28 LAB — METHYLMALONATE SERPL-SCNC: 138 NMOL/L (ref 0–378)

## 2022-06-28 RX ORDER — PANTOPRAZOLE SODIUM 40 MG/1
40 TABLET, DELAYED RELEASE ORAL EVERY 12 HOURS
Qty: 28 TABLET | Refills: 0 | Status: SHIPPED | OUTPATIENT
Start: 2022-06-28 | End: 2022-07-12

## 2022-06-28 RX ORDER — AMOXICILLIN 500 MG/1
1000 TABLET, FILM COATED ORAL 2 TIMES DAILY
Qty: 56 TABLET | Refills: 0 | Status: SHIPPED | OUTPATIENT
Start: 2022-06-28 | End: 2022-07-12

## 2022-06-28 RX ORDER — CLARITHROMYCIN 500 MG/1
500 TABLET, COATED ORAL EVERY 12 HOURS SCHEDULED
Qty: 28 TABLET | Refills: 0 | Status: SHIPPED | OUTPATIENT
Start: 2022-06-28 | End: 2022-07-12

## 2022-06-28 RX ORDER — SACCHAROMYCES BOULARDII 250 MG
250 CAPSULE ORAL 2 TIMES DAILY
Qty: 40 CAPSULE | Refills: 0 | Status: SHIPPED | OUTPATIENT
Start: 2022-06-28 | End: 2022-07-18

## 2022-06-28 NOTE — TELEPHONE ENCOUNTER
Spoke to patient via telephone and discussed results below:    H  pylori infection  Urea breath test positive on 6/24/2022  Patient symptomatic with epigastric discomfort and GERD symptoms   - Triple therapy started on 6/28/2022  (Amoxicillin 1000 mg BID for 14 days, Clarithromycin 500 mg BID for 14 days and Pantoprazole 40 mg BID for 14 days   - Follow-up testing 1 month after completion of treatment  Iron deficiency anemia  Mild with Hgb at 11 0; Iron studies confirm iron deficiency  Likely due to menses vs  Decrease absorption   - Patient not interested in iron pills but will increase dietary intake and was counseled on foods that are high in iron  - Will also be treating H  Pylori which may be causing decreased iron absorption as well  Patient in agreement and understanding of treatment  She was also counseled to hold Statin during triple therapy treatment  She was also sent probiotics given prolonged bacteria use

## 2022-06-28 NOTE — ASSESSMENT & PLAN NOTE
Mild with Hgb at 11 0; Iron studies confirm iron deficiency  Likely due to menses vs  Decrease absorption   - Patient not interested in iron pills but will increase dietary intake and was counseled on foods that are high in iron  - Will also be treating H  Pylori which may be causing decreased iron absorption as well

## 2022-06-28 NOTE — ASSESSMENT & PLAN NOTE
Urea breath test positive on 6/24/2022  Patient symptomatic with epigastric discomfort and GERD symptoms   - Triple therapy started on 6/28/2022  (Amoxicillin 1000 mg BID for 14 days, Clarithromycin 500 mg BID for 14 days and Pantoprazole 40 mg BID for 14 days   - Follow-up testing 1 month after completion of treatment

## 2022-07-13 ENCOUNTER — OFFICE VISIT (OUTPATIENT)
Dept: FAMILY MEDICINE CLINIC | Facility: CLINIC | Age: 37
End: 2022-07-13

## 2022-07-13 VITALS
BODY MASS INDEX: 45.99 KG/M2 | HEART RATE: 69 BPM | SYSTOLIC BLOOD PRESSURE: 124 MMHG | WEIGHT: 293 LBS | DIASTOLIC BLOOD PRESSURE: 80 MMHG | TEMPERATURE: 97.6 F | OXYGEN SATURATION: 99 % | HEIGHT: 67 IN | RESPIRATION RATE: 16 BRPM

## 2022-07-13 DIAGNOSIS — E11.9 TYPE 2 DIABETES MELLITUS WITHOUT COMPLICATION, WITHOUT LONG-TERM CURRENT USE OF INSULIN (HCC): Primary | ICD-10-CM

## 2022-07-13 DIAGNOSIS — J30.1 SEASONAL ALLERGIC RHINITIS DUE TO POLLEN: ICD-10-CM

## 2022-07-13 DIAGNOSIS — A04.8 H. PYLORI INFECTION: ICD-10-CM

## 2022-07-13 DIAGNOSIS — E78.2 MIXED HYPERLIPIDEMIA: ICD-10-CM

## 2022-07-13 DIAGNOSIS — G63 PERIPHERAL NEUROPATHY DUE TO METABOLIC DISORDER (HCC): ICD-10-CM

## 2022-07-13 DIAGNOSIS — E11.9 ENCOUNTER FOR COMPREHENSIVE DIABETIC FOOT EXAMINATION, TYPE 2 DIABETES MELLITUS (HCC): ICD-10-CM

## 2022-07-13 DIAGNOSIS — E88.9 PERIPHERAL NEUROPATHY DUE TO METABOLIC DISORDER (HCC): ICD-10-CM

## 2022-07-13 PROBLEM — K04.7 DENTAL ABSCESS: Status: RESOLVED | Noted: 2020-10-23 | Resolved: 2022-07-13

## 2022-07-13 LAB — SL AMB POCT HEMOGLOBIN AIC: 10.1 (ref ?–6.5)

## 2022-07-13 PROCEDURE — 3079F DIAST BP 80-89 MM HG: CPT | Performed by: FAMILY MEDICINE

## 2022-07-13 PROCEDURE — 83036 HEMOGLOBIN GLYCOSYLATED A1C: CPT | Performed by: FAMILY MEDICINE

## 2022-07-13 PROCEDURE — 99214 OFFICE O/P EST MOD 30 MIN: CPT | Performed by: FAMILY MEDICINE

## 2022-07-13 PROCEDURE — 3074F SYST BP LT 130 MM HG: CPT | Performed by: FAMILY MEDICINE

## 2022-07-13 RX ORDER — FLUTICASONE PROPIONATE 50 MCG
1 SPRAY, SUSPENSION (ML) NASAL DAILY
Qty: 15.8 ML | Refills: 1 | Status: SHIPPED | OUTPATIENT
Start: 2022-07-13 | End: 2022-08-11

## 2022-07-13 RX ORDER — CETIRIZINE HYDROCHLORIDE 10 MG/1
10 TABLET ORAL DAILY
Qty: 30 TABLET | Refills: 3 | Status: SHIPPED | OUTPATIENT
Start: 2022-07-13

## 2022-07-13 NOTE — ASSESSMENT & PLAN NOTE
· Lipid panel 06/24/2022- Cholesterol-222, triglycerides-98, HDL-42, LDL-160  · Advised that she should continue atorvastatin 10 mg after completing her triple therapy for H pylori

## 2022-07-13 NOTE — ASSESSMENT & PLAN NOTE
· BP today 124/80    At goal per [facility] 8  · Previous and amlodipine however blood pressure seems to be controlled without medication currently  · Will continue to monitor throughout office visits and if needed will do a ambulatory blood pressure monitoring

## 2022-07-13 NOTE — PROGRESS NOTES
[facility]   [address], [address] [place] [address]  Phone#  [phone]  Fax#  [phone]    ASSESSMENT and PLAN  Benign essential HTN  · BP today 124/80  At goal per JNC 8  · Previous and amlodipine however blood pressure seems to be controlled without medication currently  · Will continue to monitor throughout office visits and if needed will do a ambulatory blood pressure monitoring    Type 2 diabetes mellitus without complication, without long-term current use of insulin ([facility])    Lab Results   Component Value Date    HGBA1C 10 1 (A) 07/13/2022   · Last A1c prior was 6 8  · Never treated with oral medication  Discussions about starting metformin were initially had however A1c was mildly controlled with diet and exercise  Does have a strong family history for diabetes  · Actively trying to lose weight  Has lost 6 lb since last visit  · Will retest A1c  · Advised that we will likely start metformin 500 mg b i d  and increased to 1000 mg b i d  When able to tolerate  · Patient extremely motivated to lose weight and get A1c down  Has had extremely difficult past couple of years with passing of mother and sister  Discussed extensively diet and exercise lifestyle modifications  Encourage to join a gym not only for physical benefits but also mental benefits  · Actively hydrating with 64 oz of water daily advised to continue  · Is complaining of some neuropathic pain in her bilateral lower extremities specifically feet  Please see neuropathy problem  · Will call her once result comes back and discuss metformin again    Seasonal allergic rhinitis due to pollen  · Continue Zyrtec 10 mg, Flonase 1 puff each nostril daily times 7-10 days    H  pylori infection  · Urea breath test positive on 6/24/2022  · Patient symptomatic with epigastric discomfort and GERD symptoms  · Triple therapy started on 6/28/2022   (Amoxicillin 1000 mg BID for 14 days, Clarithromycin 500 mg BID for 14 days and Pantoprazole 40 mg BID for 14 days  · Per patient, has 2 days left of treatment  Advised to not miss any doses  Will follow-up testing in 1 month     Mixed hyperlipidemia  · Lipid panel 06/24/2022- Cholesterol-222, triglycerides-98, HDL-42, LDL-160  · Advised that she should continue atorvastatin 10 mg after completing her triple therapy for H pylori    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 (HCC)  · One week history of bilateral lower extremity paresthesias with occasional muscle cramping  · Has been drinking 64 oz of water daily, likely secondary to progressively worsening type 2 diabetes mellitus  · Not likely due to foot wear as patient wears sneakers with well-supported arches on a daily basis  · Diminished sensation on the dorsal aspect of her bilateral feet on diabetic foot exam  · Negative Homans sign  Calves are equal bilaterally in size  Does have a family history of blood clots specifically on her mother side  Wells score for DVT 0  Likely diagnosis is  · Will check a BMP, magnesium  · Discussed starting gabapentin 3 times daily however do not wish to add more medications to her current regimen  · Will call her with lab results and discuss further at that time    Diagnoses and all orders for this visit:    Type 2 diabetes mellitus without complication, without long-term current use of insulin ([facility] [address] )  -     POCT hemoglobin A1c  -     HEMOGLOBIN A1C W/ EAG ESTIMATION; Future    Seasonal allergic rhinitis due to pollen  Comments:  Refill patient's flonase and ketotifen  Will start patient on Zyrtec as well and recommended to get a humidifer in room for bedtime   Orders:  -     fluticasone (FLONASE) 50 mcg/act nasal spray; 1 spray into each nostril daily  -     cetirizine (ZyrTEC) 10 mg tablet; Take 1 tablet (10 mg total) by mouth daily    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 (HCC)  -     Magnesium; Future  -     Basic metabolic panel;  Future    Encounter for comprehensive diabetic foot examination, type 2 diabetes mellitus ([address] )    H  pylori infection    Mixed hyperlipidemia      HISTORY OF PRESENT ILLNESS  Jonathan Mcclain is a very pleasant 39 y o  female with a significant PMHx of GERD, type 2 diabetes mellitus, mild intermittent asthma, hypertension, anxiety, depression,  who presents to the clinic for  management of their chronic medical conditions  Patient's medical conditions are stable unless noted otherwise above  Patient has not had any recent hospitalizations, or medical emergencies since last visit  Patient has no further complaints other than what is mentioned in the ROS  Smoking/Alcohol/Illicit Drug Use:  Does admit to smoking 5 cigarettes per day  Works as a  for high school girls  REVIEW OF SYSTEMS  Review of Systems   Constitutional: Negative for chills, fatigue and fever  HENT: Negative for sore throat  Respiratory: Negative for cough, chest tightness and shortness of breath  Cardiovascular: Negative for chest pain and leg swelling  Gastrointestinal: Negative for constipation, diarrhea, nausea and vomiting  Endocrine: Negative for polydipsia, polyphagia and polyuria  Genitourinary: Negative for dysuria  Musculoskeletal: Negative for arthralgias  Skin: Negative for rash  Neurological: Positive for numbness  Negative for dizziness, light-headedness and headaches  Psychiatric/Behavioral: Negative for agitation and behavioral problems       PAST MEDICAL HISTORY   Past Medical History:   Diagnosis Date    Dental abscess 10/23/2020    Pseudotumor cerebri      Past Surgical History:   Procedure Laterality Date    TONSILLECTOMY       Social History     Socioeconomic History    Marital status: Single     Spouse name: Not on file    Number of children: Not on file    Years of education: Not on file    Highest education level: Not on file   Occupational History    Not on file   Tobacco Use    Smoking status: Former Smoker     Packs/day: 0 50     Types: Cigarettes    Smokeless tobacco: Never Used    Tobacco comment: pt quit 27 days ago   Vaping Use    Vaping Use: Never used   Substance and Sexual Activity    Alcohol use: No    Drug use: No    Sexual activity: Not on file   Other Topics Concern    Not on file   Social History Narrative    Not on file     Social Determinants of Health     Financial Resource Strain: Low Risk     Difficulty of Paying Living Expenses: Not hard at all   Food Insecurity: No Food Insecurity    Worried About Running Out of Food in the Last Year: Never true    Aleksey of Food in the Last Year: Never true   Transportation Needs: No Transportation Needs    Lack of Transportation (Medical): No    Lack of Transportation (Non-Medical): No   Physical Activity: Not on file   Stress: Not on file   Social Connections: Not on file   Intimate Partner Violence: Not on file   Housing Stability: Not on file     No family history on file      MEDICATIONS    Current Outpatient Medications:     cetirizine (ZyrTEC) 10 mg tablet, Take 1 tablet (10 mg total) by mouth daily, Disp: 30 tablet, Rfl: 3    fluticasone (FLONASE) 50 mcg/act nasal spray, 1 spray into each nostril daily, Disp: 15 8 mL, Rfl: 1    albuterol (PROVENTIL HFA,VENTOLIN HFA) 90 mcg/act inhaler, Inhale 2 puffs every 6 (six) hours as needed for wheezing or shortness of breath, Disp: 6 7 g, Rfl: 0    amLODIPine (NORVASC) 10 mg tablet, Take 1 tablet (10 mg total) by mouth daily, Disp: 90 tablet, Rfl: 1    ascorbic acid (VITAMIN C) 250 mg tablet, Take 1 tablet (250 mg total) by mouth daily, Disp: 90 tablet, Rfl: 1    atorvastatin (LIPITOR) 10 mg tablet, Take 1 tablet (10 mg total) by mouth in the morning , Disp: 30 tablet, Rfl: 3    Blood Pressure KIT, by Does not apply route daily (Patient not taking: Reported on 7/15/2021), Disp: 1 each, Rfl: 0    ferrous sulfate 324 (65 Fe) mg, Take 1 tablet (324 mg total) by mouth every other day, Disp: 60 tablet, Rfl: 1    metFORMIN (GLUCOPHAGE-XR) 500 mg 24 hr tablet, Take 1 tablet (500 mg total) by mouth daily with dinner, Disp: 60 tablet, Rfl: 1    ondansetron (Zofran ODT) 4 mg disintegrating tablet, Take 1 tablet (4 mg total) by mouth every 6 (six) hours as needed for nausea or vomiting for up to 6 doses, Disp: 6 tablet, Rfl: 0    pantoprazole (PROTONIX) 40 mg tablet, Take 1 tablet (40 mg total) by mouth every 12 (twelve) hours for 14 days, Disp: 28 tablet, Rfl: 0    saccharomyces boulardii (FLORASTOR) 250 mg capsule, Take 1 capsule (250 mg total) by mouth 2 (two) times a day for 20 days, Disp: 40 capsule, Rfl: 0    silver sulfadiazine (SILVADENE,SSD) 1 % cream, Apply topically daily (Patient not taking: Reported on 12/21/2020), Disp: 50 g, Rfl: 0    Vitamin E 100 units TABS, Take 0 5 tablets (50 Units total) by mouth daily, Disp: 30 tablet, Rfl: 0    PHYSICAL EXAM  Vitals:    07/13/22 1020   BP: 124/80   BP Location: Left arm   Patient Position: Sitting   Cuff Size: Large   Pulse: 69   Resp: 16   Temp: 97 6 °F (36 4 °C)   TempSrc: Temporal   SpO2: 99%   Weight: 134 kg (295 lb)   Height: 5' 7" (1 702 m)     Wt Readings from Last 3 Encounters:   07/13/22 134 kg (295 lb)   05/19/22 (!) 137 kg (302 lb)   04/07/22 (!) 139 kg (307 lb)    , Body mass index is 46 2 kg/m²  Physical Exam  Constitutional:       Appearance: She is well-developed  HENT:      Head: Normocephalic and atraumatic  Eyes:      Pupils: Pupils are equal, round, and reactive to light  Cardiovascular:      Rate and Rhythm: Normal rate and regular rhythm  Pulses: no weak pulses          Dorsalis pedis pulses are 2+ on the right side and 2+ on the left side  Heart sounds: Normal heart sounds  Pulmonary:      Effort: Pulmonary effort is normal       Breath sounds: Normal breath sounds  Abdominal:      General: Bowel sounds are normal       Palpations: Abdomen is soft     Musculoskeletal:         General: Normal range of motion  Cervical back: Normal range of motion and neck supple  Right foot: Normal range of motion  No deformity  Left foot: Normal range of motion  No deformity  Feet:    Feet:      Right foot:      Skin integrity: Skin integrity normal  No ulcer, blister, skin breakdown, erythema, warmth, callus or dry skin  Toenail Condition: Right toenails are normal       Left foot:      Skin integrity: Skin integrity normal  No ulcer, blister, skin breakdown, erythema, warmth, callus or dry skin  Toenail Condition: Left toenails are normal    Skin:     General: Skin is warm  Findings: No erythema or rash  Neurological:      Mental Status: She is alert and oriented to person, place, and time  Psychiatric:         Behavior: Behavior normal        Patient's shoes and socks removed  Right Foot/Ankle   Right Foot Inspection  Skin Exam: skin normal and skin intact  No dry skin, no warmth, no blister, no callus, no erythema, no maceration, no abnormal color, no pre-ulcer, no ulcer and no callus  Toe Exam: ROM and strength within normal limits  Sensory   Vibration: diminished  Proprioception: diminished  Monofilament testing: diminished    Vascular  Capillary refills: < 3 seconds  The right DP pulse is 2+  Left Foot/Ankle  Left Foot Inspection  Skin Exam: skin normal and skin intact  No dry skin, no warmth, no erythema, no maceration, normal color, no pre-ulcer, no ulcer and no callus  Toe Exam: ROM and strength within normal limits  Sensory   Vibration: diminished  Proprioception: diminished  Monofilament testing: diminished    Vascular  Capillary refills: < 3 seconds  The left DP pulse is 2+  Assign Risk Category  Loss of protective sensation  No weak pulses         foot exam    LABS/IMAGING  Hemoglobin A1C   Date Value Ref Range Status   07/13/2022 10 1 (A) 6 5 Final   06/09/2021 5 8 (H) Normal 3 8-5 6%; PreDiabetic 5 7-6 4%;  Diabetic >=6 5%; Glycemic control for adults with diabetes <7 0% % Final   10/05/2018 6 0 (H) <5 7 % Final     Comment:     Reference Range  Non-diabetic                     <5 7  Pre-diabetic                     5 7-6 4  Diabetic                         >=6 5  ADA target for diabetic control  <=7     HDL, Direct   Date Value Ref Range Status   06/24/2022 42 (L) >=50 mg/dL Final     Comment:     Specimen collection should occur prior to Metamizole administration due to the potential for falsley depressed results  Triglycerides   Date Value Ref Range Status   06/24/2022 98 See Comment mg/dL Final     Comment:     Triglyceride:     0-9Y            <75mg/dL     10Y-17Y         <90 mg/dL       >=18Y     Normal          <150 mg/dL     Borderline High 150-199 mg/dL     High            200-499 mg/dL        Very High       >499 mg/dL    Specimen collection should occur prior to N-Acetylcysteine or Metamizole administration due to the potential for falsely depressed results        WBC   Date Value Ref Range Status   06/24/2022 9 08 4 31 - 10 16 Thousand/uL Final   05/17/2022 10 35 (H) 4 31 - 10 16 Thousand/uL Final   04/07/2022 11 29 (H) 4 31 - 10 16 Thousand/uL Final     Hemoglobin   Date Value Ref Range Status   06/24/2022 11 0 (L) 11 5 - 15 4 g/dL Final   05/17/2022 11 2 (L) 11 5 - 15 4 g/dL Final   04/07/2022 11 0 (L) 11 5 - 15 4 g/dL Final     Platelets   Date Value Ref Range Status   06/24/2022 318 149 - 390 Thousands/uL Final   05/17/2022 367 149 - 390 Thousands/uL Final   04/07/2022 337 149 - 390 Thousands/uL Final     Potassium   Date Value Ref Range Status   05/17/2022 3 5 3 5 - 5 3 mmol/L Final   04/07/2022 3 7 3 5 - 5 3 mmol/L Final   06/05/2020 3 3 (L) 3 6 - 5 0 mmol/L Final     Chloride   Date Value Ref Range Status   05/17/2022 108 100 - 108 mmol/L Final   04/07/2022 107 100 - 108 mmol/L Final   06/05/2020 96 (L) 97 - 108 mmol/L Final     CO2   Date Value Ref Range Status   05/17/2022 24 21 - 32 mmol/L Final   04/07/2022 26 21 - 32 mmol/L Final 06/05/2020 28 22 - 30 mmol/L Final     BUN   Date Value Ref Range Status   05/17/2022 13 5 - 25 mg/dL Final   04/07/2022 14 5 - 25 mg/dL Final   06/05/2020 9 5 - 25 mg/dL Final     Creatinine   Date Value Ref Range Status   05/17/2022 0 98 0 60 - 1 30 mg/dL Final     Comment:     Standardized to IDMS reference method   04/07/2022 1 01 0 60 - 1 30 mg/dL Final     Comment:     Standardized to IDMS reference method   06/05/2020 1 19 0 60 - 1 20 mg/dL Final     Comment:     Standardized to IDMS reference method     eGFR   Date Value Ref Range Status   05/17/2022 73 ml/min/1 73sq m Final   04/07/2022 71 ml/min/1 73sq m Final   06/05/2020 68 >60 ml/min/1 73sq m Final     Calcium   Date Value Ref Range Status   05/17/2022 9 1 8 3 - 10 1 mg/dL Final   04/07/2022 9 2 8 3 - 10 1 mg/dL Final   06/05/2020 10 0 8 4 - 10 2 mg/dL Final     AST   Date Value Ref Range Status   05/17/2022 10 5 - 45 U/L Final     Comment:     Specimen collection should occur prior to Sulfasalazine administration due to the potential for falsely depressed results  04/07/2022 9 5 - 45 U/L Final     Comment:     Specimen collection should occur prior to Sulfasalazine administration due to the potential for falsely depressed results  06/05/2020 17 14 - 36 U/L Final     Comment:       Specimen collection should occur prior to Sulfasalazine administration due to the potential for falsely depressed results  ALT   Date Value Ref Range Status   05/17/2022 17 12 - 78 U/L Final     Comment:     Specimen collection should occur prior to Sulfasalazine and/or Sulfapyridine administration due to the potential for falsely depressed results  04/07/2022 14 12 - 78 U/L Final     Comment:     Specimen collection should occur prior to Sulfasalazine and/or Sulfapyridine administration due to the potential for falsely depressed results      06/05/2020 8 (L) 9 - 52 U/L Final     Comment:       Specimen collection should occur prior to Sulfasalazine administration due to the potential for falsely depressed results  Alkaline Phosphatase   Date Value Ref Range Status   05/17/2022 84 46 - 116 U/L Final   04/07/2022 79 46 - 116 U/L Final   06/05/2020 97 43 - 122 U/L Final     No results found for: TSH    This note has been dictated using Xiant software  It may contain errors, including improperly dictated words  Please contact physician directly for any questions       Janice White MD   PGY-3

## 2022-07-13 NOTE — ASSESSMENT & PLAN NOTE
Lab Results   Component Value Date    HGBA1C 10 1 (A) 07/13/2022   · Last A1c prior was 6 8  · Never treated with oral medication  Discussions about starting metformin were initially had however A1c was mildly controlled with diet and exercise  Does have a strong family history for diabetes  · Actively trying to lose weight  Has lost 6 lb since last visit  · Will retest A1c  · Advised that we will likely start metformin 500 mg b i d  and increased to 1000 mg b i d  When able to tolerate  · Patient extremely motivated to lose weight and get A1c down  Has had extremely difficult past couple of years with passing of mother and sister  Discussed extensively diet and exercise lifestyle modifications  Encourage to join a gym not only for physical benefits but also mental benefits  · Actively hydrating with 64 oz of water daily advised to continue  · Is complaining of some neuropathic pain in her bilateral lower extremities specifically feet    Please see neuropathy problem  · Will call her once result comes back and discuss metformin again

## 2022-07-13 NOTE — ASSESSMENT & PLAN NOTE
· One week history of bilateral lower extremity paresthesias with occasional muscle cramping  · Has been drinking 64 oz of water daily, likely secondary to progressively worsening type 2 diabetes mellitus  · Not likely due to foot wear as patient wears sneakers with well-supported arches on a daily basis  · Diminished sensation on the dorsal aspect of her bilateral feet on diabetic foot exam  · Negative Homans sign  Calves are equal bilaterally in size  Does have a family history of blood clots specifically on her mother side  Wells score for DVT 0   Likely diagnosis is  · Will check a BMP, magnesium  · Discussed starting gabapentin 3 times daily however do not wish to add more medications to her current regimen  · Will call her with lab results and discuss further at that time

## 2022-07-13 NOTE — ASSESSMENT & PLAN NOTE
· Urea breath test positive on 6/24/2022  · Patient symptomatic with epigastric discomfort and GERD symptoms  · Triple therapy started on 6/28/2022  (Amoxicillin 1000 mg BID for 14 days, Clarithromycin 500 mg BID for 14 days and Pantoprazole 40 mg BID for 14 days  · Per patient, has 2 days left of treatment  Advised to not miss any doses    Will follow-up testing in 1 month

## 2022-07-15 ENCOUNTER — TELEPHONE (OUTPATIENT)
Dept: FAMILY MEDICINE CLINIC | Facility: CLINIC | Age: 37
End: 2022-07-15

## 2022-07-15 DIAGNOSIS — E11.9 TYPE 2 DIABETES MELLITUS WITHOUT COMPLICATION, WITHOUT LONG-TERM CURRENT USE OF INSULIN (HCC): Primary | ICD-10-CM

## 2022-07-15 NOTE — TELEPHONE ENCOUNTER
Spoke with Marlena Mejia for medical advise and she advise for the pt to avoid sugary food and drinks and to continue to just monitor it because she is not having any symptoms  Informed pt to go to ED if her sugar goes up more and if she starts having symptoms

## 2022-07-15 NOTE — TELEPHONE ENCOUNTER
Spoke to pt regarding high glucose levels #333  Per pt he had white bread and grapes  Pt denied any symptoms but states she was feeling a little bit weak  Pt states she wasn't able to take  metformin on 7/13/22 but went today to  the medication  Advise pt to start medication today at dinner time to take 1 tablets, since metformin can cause stomach discomfort, to take 1 tab tomorrow in the morning and 1 at dinner time, if she is feeling ok by Sunday, I spoke to Dr Ferrara Hamper she can start 2 tabs in the AM and 2 tabs in the PM  Advise pt to limit the amount of carb and add more vegetables to her diet  Pt was instructed to keep a log of her sugar levels  Pt needs a glucometer sent to the pharmacy

## 2022-07-15 NOTE — TELEPHONE ENCOUNTER
Pt states she checked her bs and it was 316 and she would like to know what she should do   Please advise

## 2022-07-16 ENCOUNTER — NURSE TRIAGE (OUTPATIENT)
Dept: OTHER | Facility: OTHER | Age: 37
End: 2022-07-16

## 2022-07-16 ENCOUNTER — HOSPITAL ENCOUNTER (EMERGENCY)
Facility: HOSPITAL | Age: 37
Discharge: HOME | End: 2022-07-16
Attending: EMERGENCY MEDICINE
Payer: COMMERCIAL

## 2022-07-16 VITALS
OXYGEN SATURATION: 100 % | TEMPERATURE: 99.1 F | SYSTOLIC BLOOD PRESSURE: 148 MMHG | DIASTOLIC BLOOD PRESSURE: 90 MMHG | HEART RATE: 90 BPM | RESPIRATION RATE: 16 BRPM

## 2022-07-16 DIAGNOSIS — E11.65 HYPERGLYCEMIA DUE TO TYPE 2 DIABETES MELLITUS (HCC): Primary | ICD-10-CM

## 2022-07-16 DIAGNOSIS — E11.9 TYPE 2 DIABETES MELLITUS WITHOUT COMPLICATION, WITHOUT LONG-TERM CURRENT USE OF INSULIN (HCC): Primary | ICD-10-CM

## 2022-07-16 DIAGNOSIS — R03.0 ELEVATED BLOOD PRESSURE READING: ICD-10-CM

## 2022-07-16 LAB
ALBUMIN SERPL BCP-MCNC: 3.8 G/DL (ref 3.5–5)
ALP SERPL-CCNC: 90 U/L (ref 46–116)
ALT SERPL W P-5'-P-CCNC: 19 U/L (ref 12–78)
ANION GAP SERPL CALCULATED.3IONS-SCNC: 9 MMOL/L (ref 4–13)
AST SERPL W P-5'-P-CCNC: 9 U/L (ref 5–45)
BACTERIA UR QL AUTO: NORMAL /HPF
BASOPHILS # BLD AUTO: 0.06 THOUSANDS/ΜL (ref 0–0.1)
BASOPHILS NFR BLD AUTO: 1 % (ref 0–1)
BILIRUB SERPL-MCNC: 0.21 MG/DL (ref 0.2–1)
BILIRUB UR QL STRIP: NEGATIVE
BUN SERPL-MCNC: 20 MG/DL (ref 5–25)
CALCIUM SERPL-MCNC: 9.3 MG/DL (ref 8.3–10.1)
CHLORIDE SERPL-SCNC: 101 MMOL/L (ref 100–108)
CLARITY UR: CLEAR
CO2 SERPL-SCNC: 22 MMOL/L (ref 21–32)
COLOR UR: ABNORMAL
CREAT SERPL-MCNC: 1.16 MG/DL (ref 0.6–1.3)
EOSINOPHIL # BLD AUTO: 0.12 THOUSAND/ΜL (ref 0–0.61)
EOSINOPHIL NFR BLD AUTO: 1 % (ref 0–6)
ERYTHROCYTE [DISTWIDTH] IN BLOOD BY AUTOMATED COUNT: 12.7 % (ref 11.6–15.1)
EXT PREG TEST URINE: NEGATIVE
EXT. CONTROL ED NAV: NORMAL
GFR SERPL CREATININE-BSD FRML MDRD: 60 ML/MIN/1.73SQ M
GLUCOSE SERPL-MCNC: 322 MG/DL (ref 65–140)
GLUCOSE SERPL-MCNC: 330 MG/DL (ref 65–140)
GLUCOSE UR STRIP-MCNC: ABNORMAL MG/DL
HCT VFR BLD AUTO: 37.4 % (ref 34.8–46.1)
HGB BLD-MCNC: 12 G/DL (ref 11.5–15.4)
HGB UR QL STRIP.AUTO: NEGATIVE
IMM GRANULOCYTES # BLD AUTO: 0.03 THOUSAND/UL (ref 0–0.2)
IMM GRANULOCYTES NFR BLD AUTO: 0 % (ref 0–2)
KETONES UR STRIP-MCNC: ABNORMAL MG/DL
LEUKOCYTE ESTERASE UR QL STRIP: ABNORMAL
LYMPHOCYTES # BLD AUTO: 3.15 THOUSANDS/ΜL (ref 0.6–4.47)
LYMPHOCYTES NFR BLD AUTO: 29 % (ref 14–44)
MCH RBC QN AUTO: 26.7 PG (ref 26.8–34.3)
MCHC RBC AUTO-ENTMCNC: 32.1 G/DL (ref 31.4–37.4)
MCV RBC AUTO: 83 FL (ref 82–98)
MONOCYTES # BLD AUTO: 0.47 THOUSAND/ΜL (ref 0.17–1.22)
MONOCYTES NFR BLD AUTO: 4 % (ref 4–12)
NEUTROPHILS # BLD AUTO: 6.87 THOUSANDS/ΜL (ref 1.85–7.62)
NEUTS SEG NFR BLD AUTO: 65 % (ref 43–75)
NITRITE UR QL STRIP: NEGATIVE
NON-SQ EPI CELLS URNS QL MICRO: NORMAL /HPF
NRBC BLD AUTO-RTO: 0 /100 WBCS
PH UR STRIP.AUTO: 5 [PH]
PLATELET # BLD AUTO: 325 THOUSANDS/UL (ref 149–390)
PMV BLD AUTO: 12.5 FL (ref 8.9–12.7)
POTASSIUM SERPL-SCNC: 3.8 MMOL/L (ref 3.5–5.3)
PROT SERPL-MCNC: 8.5 G/DL (ref 6.4–8.2)
PROT UR STRIP-MCNC: ABNORMAL MG/DL
RBC # BLD AUTO: 4.5 MILLION/UL (ref 3.81–5.12)
RBC #/AREA URNS AUTO: NORMAL /HPF
SODIUM SERPL-SCNC: 132 MMOL/L (ref 136–145)
SP GR UR STRIP.AUTO: 1.04 (ref 1–1.03)
UROBILINOGEN UR STRIP-ACNC: <2 MG/DL
WBC # BLD AUTO: 10.7 THOUSAND/UL (ref 4.31–10.16)
WBC #/AREA URNS AUTO: NORMAL /HPF

## 2022-07-16 PROCEDURE — 99283 EMERGENCY DEPT VISIT LOW MDM: CPT

## 2022-07-16 PROCEDURE — 96365 THER/PROPH/DIAG IV INF INIT: CPT

## 2022-07-16 PROCEDURE — 82948 REAGENT STRIP/BLOOD GLUCOSE: CPT

## 2022-07-16 PROCEDURE — 96366 THER/PROPH/DIAG IV INF ADDON: CPT

## 2022-07-16 PROCEDURE — 85025 COMPLETE CBC W/AUTO DIFF WBC: CPT | Performed by: EMERGENCY MEDICINE

## 2022-07-16 PROCEDURE — 81001 URINALYSIS AUTO W/SCOPE: CPT | Performed by: EMERGENCY MEDICINE

## 2022-07-16 PROCEDURE — 99284 EMERGENCY DEPT VISIT MOD MDM: CPT | Performed by: EMERGENCY MEDICINE

## 2022-07-16 PROCEDURE — 36415 COLL VENOUS BLD VENIPUNCTURE: CPT | Performed by: EMERGENCY MEDICINE

## 2022-07-16 PROCEDURE — 80053 COMPREHEN METABOLIC PANEL: CPT | Performed by: EMERGENCY MEDICINE

## 2022-07-16 PROCEDURE — 81025 URINE PREGNANCY TEST: CPT | Performed by: EMERGENCY MEDICINE

## 2022-07-16 RX ORDER — LANCETS 33 GAUGE
EACH MISCELLANEOUS 2 TIMES DAILY
Qty: 100 EACH | Refills: 3 | Status: SHIPPED | OUTPATIENT
Start: 2022-07-16

## 2022-07-16 RX ORDER — BLOOD-GLUCOSE METER
EACH MISCELLANEOUS 2 TIMES DAILY
Qty: 1 KIT | Refills: 0 | Status: SHIPPED | OUTPATIENT
Start: 2022-07-16

## 2022-07-16 RX ORDER — BLOOD SUGAR DIAGNOSTIC
STRIP MISCELLANEOUS
Qty: 100 EACH | Refills: 3 | Status: SHIPPED | OUTPATIENT
Start: 2022-07-16

## 2022-07-16 RX ORDER — SODIUM CHLORIDE, SODIUM GLUCONATE, SODIUM ACETATE, POTASSIUM CHLORIDE, MAGNESIUM CHLORIDE, SODIUM PHOSPHATE, DIBASIC, AND POTASSIUM PHOSPHATE .53; .5; .37; .037; .03; .012; .00082 G/100ML; G/100ML; G/100ML; G/100ML; G/100ML; G/100ML; G/100ML
1000 INJECTION, SOLUTION INTRAVENOUS ONCE
Status: COMPLETED | OUTPATIENT
Start: 2022-07-16 | End: 2022-07-16

## 2022-07-16 RX ADMIN — SODIUM CHLORIDE, SODIUM GLUCONATE, SODIUM ACETATE, POTASSIUM CHLORIDE, MAGNESIUM CHLORIDE, SODIUM PHOSPHATE, DIBASIC, AND POTASSIUM PHOSPHATE 1000 ML: .53; .5; .37; .037; .03; .012; .00082 INJECTION, SOLUTION INTRAVENOUS at 16:12

## 2022-07-16 NOTE — TELEPHONE ENCOUNTER
Reason for Disposition   [1] Prescription prescribed recently is not at pharmacy AND [2] triager has access to patient's EMR AND [3] prescription is recorded in the EMR    Answer Assessment - Initial Assessment Questions  1  NAME of MEDICATION: "What medicine are you calling about?"      Glucometer, test strips and lancets    2  QUESTION: "What is your question?" (e g , medication refill, side effect)    Not at pharmacy    3  PRESCRIBING HCP: "Who prescribed it?" Reason: if prescribed by specialist, call should be referred to that group  Dr Denise Schwartz  4  SYMPTOMS: "Do you have any symptoms?"    denies  5  SEVERITY: If symptoms are present, ask "Are they mild, moderate or severe?"  No    6  PREGNANCY:  "Is there any chance that you are pregnant?" "When was your last menstrual period?"      No    Protocols used: MEDICATION QUESTION CALL-ADULT-    Paged on call provider of patients med request to pharmacy

## 2022-07-16 NOTE — TELEPHONE ENCOUNTER
Regarding: Glucometer and supplies  ----- Message from Vicente Su sent at 7/16/2022  9:44 AM EDT -----  Patient was ordered a new glucose meter and supplies by [address] [place] [address] yesterday, and was told to pick them up at a [facility]     Someone sent the referral to [facility]? ?      Glucometer (Order [id number])  Glucometer test strips (Order [id number])  Lancets (Order [id number])

## 2022-07-16 NOTE — DISCHARGE INSTRUCTIONS
You were evaluated in the emergency department for: hyperglycemia  You can access your current and pending results through [facility]  A radiologist will take a second look at your X-Rays, if you had any, and you will be contacted with any new findings  You should follow-up with your primary care provider, as soon as possible, for re-evaluation  You have also been referred to endo crinology and you should follow up with them as well  Your workup revealed no emergent features at this time; however, many disease processes are dynamic:    Please, return to the emergency department if you experience new or worsening symptoms, fever, chest pain, shortness of breath, difficulty breathing, dizziness, abdominal pain, persistent nausea/vomiting, syncope or passing out, blood in your urine or stool, coughing up blood, leg swelling/pain, urinary retention, bowel or bladder incontinence, numbness between your legs  Additionally, your blood pressure was measured to be high  This is something that you should discuss with your primary care provider and have re-checked within one week

## 2022-07-16 NOTE — ED ATTENDING ATTESTATION
7/16/2022  I, Edmond Corey MD, saw and evaluated the patient  I have discussed the patient with the resident/non-physician practitioner and agree with the resident's/non-physician practitioner's findings, Plan of Care, and MDM as documented in the resident's/non-physician practitioner's note, except where noted  All available labs and Radiology studies were reviewed  I was present for key portions of any procedure(s) performed by the resident/non-physician practitioner and I was immediately available to provide assistance  At this point I agree with the current assessment done in the Emergency Department    I have conducted an independent evaluation of this patient a history and physical is as follows:  Recent dx of aodm    Started on metformin  Today    Took BS  Today for the first time   Yesterday  300s and today 400s    No fever      No dysuria    Has polyuria and  Polydipsia  Now the patient has a glucose of for 330   Exam the patient is in no acute distress HEENT exam is unremarkable lungs clear heart regular abdomen soft nontender extremities nontender neurologic exam nonfocal  Impression:  Hyperglycemia asymptomatic    ED Course         Critical Care Time  Procedures

## 2022-07-16 NOTE — ED PROVIDER NOTES
History  Chief Complaint   Patient presents with    Hyperglycemia - no symptoms     Diagnosed with DM 2 days ago, started taking metformin last night, reports BG at noon was over 400, called PCP and told to come to ED  Offers no complaints, reports "I know nothing about diabetes, I dont know anything I should be doing "     Patient is a 48-year-old female, with a history significant for tobacco use, obesity, recently diagnosed diabetes mellitus type 2 (diagnosis two days ago, prescribed metformin, patient just started checking her blood sugar and taking metformin last evening, A1C ), who presents to the ED today, at the recommendation of her primary doctor, due to hyperglycemia greater than 400  Patient states that she took her blood glucose this morning shortly after eating hash brown  Currently, she states that she is at her baseline state of health and is without concerns except for not understanding diabetes or to management  She still reports polyuria, polydipsia, and neuropathy in her lower extremities but that this is unchanged from her baseline  Yesterday, patient stated that she had "jitters" but this resolved shortly after p o  intake  Her blood sugar was in the low 300s at that time  Patient denies fever, chest pain, shortness of breath, vomiting, abdominal pain  Her A1c calculation, patient's average glucose in the mid 200s  Patient is without other concerns at this time     - No language barrier    - History obtained from patient  - There are no limitations to the history obtained  - Previous charting was reviewed          Prior to Admission Medications   Prescriptions Last Dose Informant Patient Reported? Taking?    Blood Glucose Monitoring Suppl (OneTouch Verio) w/Device KIT   No No   Sig: Use 2 (two) times a day Testing sugars 2xs daily   Blood Pressure KIT   No No   Sig: by Does not apply route daily   Patient not taking: Reported on 3/86/5371   OneTouch Delica Lancets 51Z MISC   No No Sig: Use 2 (two) times a day Testing sugars 2xs daily   Vitamin E 100 units TABS   No No   Sig: Take 0 5 tablets (50 Units total) by mouth daily   albuterol (PROVENTIL HFA,VENTOLIN HFA) 90 mcg/act inhaler   No No   Sig: Inhale 2 puffs every 6 (six) hours as needed for wheezing or shortness of breath   amLODIPine (NORVASC) 10 mg tablet   No No   Sig: Take 1 tablet (10 mg total) by mouth daily   ascorbic acid (VITAMIN C) 250 mg tablet   No No   Sig: Take 1 tablet (250 mg total) by mouth daily   atorvastatin (LIPITOR) 10 mg tablet   No No   Sig: Take 1 tablet (10 mg total) by mouth in the morning  cetirizine (ZyrTEC) 10 mg tablet   No No   Sig: Take 1 tablet (10 mg total) by mouth daily   ferrous sulfate 324 (65 Fe) mg   No No   Sig: Take 1 tablet (324 mg total) by mouth every other day   fluticasone (FLONASE) 50 mcg/act nasal spray   No No   Si spray into each nostril daily   glucose blood (OneTouch Verio) test strip   No No   Sig: Testing sugars 2xs daily   metFORMIN (GLUCOPHAGE) 500 mg tablet   No No   Sig: Take 1 tablet (500 mg total) by mouth 2 (two) times a day with meals   ondansetron (Zofran ODT) 4 mg disintegrating tablet   No No   Sig: Take 1 tablet (4 mg total) by mouth every 6 (six) hours as needed for nausea or vomiting for up to 6 doses   pantoprazole (PROTONIX) 40 mg tablet   No No   Sig: Take 1 tablet (40 mg total) by mouth every 12 (twelve) hours for 14 days   saccharomyces boulardii (FLORASTOR) 250 mg capsule   No No   Sig: Take 1 capsule (250 mg total) by mouth 2 (two) times a day for 20 days   silver sulfadiazine (SILVADENE,SSD) 1 % cream   No No   Sig: Apply topically daily   Patient not taking: Reported on 2020      Facility-Administered Medications: None       Past Medical History:   Diagnosis Date    Dental abscess 10/23/2020    Pseudotumor cerebri        Past Surgical History:   Procedure Laterality Date    TONSILLECTOMY         History reviewed  No pertinent family history    I have reviewed and agree with the history as documented  E-Cigarette/Vaping    E-Cigarette Use Never User      E-Cigarette/Vaping Substances    Nicotine No     THC No     CBD No     Flavoring No     Other No     Unknown No      Social History     Tobacco Use    Smoking status: Current Every Day Smoker     Packs/day: 0 25     Types: Cigarettes    Smokeless tobacco: Never Used    Tobacco comment: pt quit 27 days ago   Vaping Use    Vaping Use: Never used   Substance Use Topics    Alcohol use: No    Drug use: No        Review of Systems   Constitutional: Negative for fever  HENT: Negative for trouble swallowing  Eyes: Negative for visual disturbance  Respiratory: Negative for shortness of breath  Cardiovascular: Negative for chest pain  Gastrointestinal: Negative for abdominal pain  Endocrine: Positive for polydipsia and polyuria  Genitourinary: Negative for dysuria  Musculoskeletal: Negative for gait problem  Skin: Negative for rash  Allergic/Immunologic: Positive for environmental allergies  Neurological: Positive for numbness (No new numbness: patient reports peripheral neuropathy)  Negative for weakness  Hematological: Negative for adenopathy  Psychiatric/Behavioral: Negative for confusion  All other systems reviewed and are negative  Physical Exam  ED Triage Vitals   Temperature Pulse Respirations Blood Pressure SpO2   07/16/22 1459 07/16/22 1459 07/16/22 1459 07/16/22 1502 07/16/22 1459   99 1 °F (37 3 °C) (!) 107 18 (!) 163/103 100 %      Temp src Heart Rate Source Patient Position - Orthostatic VS BP Location FiO2 (%)   -- -- -- -- --             Pain Score       07/16/22 1459       No Pain             Orthostatic Vital Signs  Vitals:    07/16/22 1459 07/16/22 1502 07/16/22 1746   BP:  (!) 163/103 148/90   Pulse: (!) 107  90       Physical Exam  Vitals and nursing note reviewed  Constitutional:       General: She is not in acute distress       Appearance: She is obese  She is not ill-appearing, toxic-appearing or diaphoretic  Comments: Patient appears comfortable during my evaluation    HENT:      Head: Normocephalic and atraumatic  Right Ear: External ear normal       Left Ear: External ear normal       Nose: Nose normal  No rhinorrhea  Mouth/Throat:      Mouth: Mucous membranes are moist       Pharynx: Oropharynx is clear  No oropharyngeal exudate or posterior oropharyngeal erythema  Eyes:      General: No scleral icterus  Right eye: No discharge  Left eye: No discharge  Conjunctiva/sclera: Conjunctivae normal       Pupils: Pupils are equal, round, and reactive to light  Neck:      Comments: Patient is spontaneously rotating their neck to the left and right during the history and physical exam interaction without difficulty or apparent discomfort    Cardiovascular:      Rate and Rhythm: Normal rate and regular rhythm  Pulses: Normal pulses  Heart sounds: Normal heart sounds  No murmur heard  No friction rub  No gallop  Comments: 2+ Radial  Pulmonary:      Effort: Pulmonary effort is normal  No respiratory distress  Breath sounds: Normal breath sounds  No stridor  No wheezing, rhonchi or rales  Abdominal:      General: Abdomen is flat  There is no distension  Palpations: Abdomen is soft  Tenderness: There is no abdominal tenderness  There is no right CVA tenderness, left CVA tenderness, guarding or rebound  Musculoskeletal:         General: No deformity  Cervical back: Neck supple  No tenderness  No muscular tenderness  Lymphadenopathy:      Cervical: No cervical adenopathy  Skin:     General: Skin is warm and dry  Capillary Refill: Capillary refill takes less than 2 seconds  Neurological:      Mental Status: She is alert  Comments: Patient is speaking clearly in complete sentences  Patient is answering appropriately and able follow commands    Patient is moving all four extremities spontaneously  No facial droop  Tongue midline        Psychiatric:         Mood and Affect: Mood normal          Behavior: Behavior normal          ED Medications  Medications   multi-electrolyte (ISOLYTE-S PH 7 4) bolus 1,000 mL (0 mL Intravenous Stopped 7/16/22 1745)       Diagnostic Studies  Results Reviewed     Procedure Component Value Units Date/Time    Urine Microscopic [[id number]]  (Normal) Collected: 07/16/22 1611    Lab Status: Final result Specimen: Urine, Other Updated: 07/16/22 1658     RBC, UA None Seen /hpf      WBC, UA 1-2 /hpf      Epithelial Cells Occasional /hpf      Bacteria, UA None Seen /hpf     Comprehensive metabolic panel [[id number]]  (Abnormal) Collected: 07/16/22 1611    Lab Status: Final result Specimen: Blood from Arm, Left Updated: 07/16/22 1649     Sodium 132 mmol/L      Potassium 3 8 mmol/L      Chloride 101 mmol/L      CO2 22 mmol/L      ANION GAP 9 mmol/L      BUN 20 mg/dL      Creatinine 1 16 mg/dL      Glucose 322 mg/dL      Calcium 9 3 mg/dL      AST 9 U/L      ALT 19 U/L      Alkaline Phosphatase 90 U/L      Total Protein 8 5 g/dL      Albumin 3 8 g/dL      Total Bilirubin 0 21 mg/dL      eGFR 60 ml/min/1 73sq m     Narrative:      [place] guidelines for Chronic Kidney Disease (CKD):     Stage 1 with normal or high GFR (GFR > 90 mL/min/1 73 square meters)    Stage 2 Mild CKD (GFR = 60-89 mL/min/1 73 square meters)    Stage 3A Moderate CKD (GFR = 45-59 mL/min/1 73 square meters)    Stage 3B Moderate CKD (GFR = 30-44 mL/min/1 73 square meters)    Stage 4 Severe CKD (GFR = 15-29 mL/min/1 73 square meters)    Stage 5 End Stage CKD (GFR <15 mL/min/1 73 square meters)  Note: GFR calculation is accurate only with a steady state creatinine    UA w Reflex to Microscopic w Reflex to Culture [[id number]]  (Abnormal) Collected: 07/16/22 1611    Lab Status: Final result Specimen: Urine, Other Updated: 07/16/22 1640     Color, UA Light Yellow Clarity, UA Clear     Specific Gravity, UA 1 038     pH, UA 5 0     Leukocytes, UA Elevated glucose may cause decreased leukocyte values  See urine microscopic for [facility] result/     Nitrite, UA Negative     Protein, UA Trace mg/dl      Glucose, UA >=1000 (1%) mg/dl      Ketones, UA 20 (1+) mg/dl      Urobilinogen, UA <2 0 mg/dl      Bilirubin, UA Negative     Occult Blood, UA Negative    CBC and differential [[id number]]  (Abnormal) Collected: 07/16/22 1611    Lab Status: Final result Specimen: Blood from Arm, Left Updated: 07/16/22 1626     WBC 10 70 Thousand/uL      RBC 4 50 Million/uL      Hemoglobin 12 0 g/dL      Hematocrit 37 4 %      MCV 83 fL      MCH 26 7 pg      MCHC 32 1 g/dL      RDW 12 7 %      MPV 12 5 fL      Platelets 762 Thousands/uL      nRBC 0 /100 WBCs      Neutrophils Relative 65 %      Immat GRANS % 0 %      Lymphocytes Relative 29 %      Monocytes Relative 4 %      Eosinophils Relative 1 %      Basophils Relative 1 %      Neutrophils Absolute 6 87 Thousands/µL      Immature Grans Absolute 0 03 Thousand/uL      Lymphocytes Absolute 3 15 Thousands/µL      Monocytes Absolute 0 47 Thousand/µL      Eosinophils Absolute 0 12 Thousand/µL      Basophils Absolute 0 06 Thousands/µL     POCT pregnancy, urine [[id number]]  (Normal) Resulted: 07/16/22 1615    Lab Status: Final result Updated: 07/16/22 1615     EXT PREG TEST UR (Ref: Negative) negative     Control valid    Fingerstick Glucose (POCT) [[id number]]  (Abnormal) Collected: 07/16/22 1530    Lab Status: Final result Updated: 07/16/22 1534     POC Glucose 330 mg/dl                  No orders to display         Procedures  Procedures      ED Course  ED Course as of 07/16/22 1821   Sat Jul 16, 2022   1656 Ketones, UA(!): 20 (1+)   1656 Anion Gap: 9   1657 PREGNANCY TEST URINE: negative   1711 Bacteria, UA: None Seen   1722 Results reviewed with patient    After receiving discharge instructions and return precautions, patient asked questions regarding the nature of diabetes and different interventions that she can take  These were answered to her satisfaction  She had no further questions or concerns                                       MDM  Number of Diagnoses or Management Options  Elevated blood pressure reading  Hyperglycemia due to type 2 diabetes mellitus ([facility] [address] )  Diagnosis management comments: Patient is a 18-year-old female, with a history significant for tobacco use, obesity, recently diagnosed diabetes mellitus type 2 (diagnosis two days ago, prescribed metformin, patient just started checking her blood sugar and taking metformin last evening, A1C ), who presents to the ED today, at the recommendation of her primary doctor, due to hyperglycemia greater than 400  Patient states that she took her blood glucose this morning shortly after eating hash brown  Currently, she states that she is at her baseline state of health and is without concerns except for not understanding diabetes or to management  She still reports polyuria, polydipsia, and neuropathy in her lower extremities but that this is unchanged from her baseline  Patient is currently afebrile, slightly tachycardic, and hemodynamically stable  Her physical exam is unremarkable  This presentation is concerning for:  Hyperglycemia due to diabetes mellitus type 2 without complication  I also considered electrolyte abnormality, dehydration  Low clinical suspicion for DKA/HHS at this time based upon history and physical exam   Will investigate with CBC, CMP, UA  Will manage with fluids, endocrine referral   Patient education provided regarding diabetes and its management        Disposition  Final diagnoses:   Hyperglycemia due to type 2 diabetes mellitus (HCC)   Elevated blood pressure reading     Time reflects when diagnosis was documented in both MDM as applicable and the Disposition within this note     Time User Action Codes Description Comment    7/16/2022  3:59 PM Lexie Vela [E11 [id number]] Hyperglycemia due to type 2 diabetes mellitus ([facility] [address] )     7/16/2022  3:59 PM William Vela Add [R03 0] Elevated blood pressure reading       ED Disposition     ED Disposition   Discharge    Condition   Stable    Date/Time   Sat Jul 16, 2022  5:13 PM    Comment   Roseann Moreno discharge to home/self care                 Follow-up Information     Follow up With Specialties Details Why Contact Info Additional [address] [facility] Schedule an appointment as soon as possible for a visit   [address], [address] [address]  [address] [facility] [address], [address], [address] [place], [place], [address]    [address] [facility] [place] [facility] Schedule an appointment as soon as possible for a visit   [address] [facility] [address] [phone] [address] [facility] [place], [address] [place], [place], [address] [facility]          Discharge Medication List as of 7/16/2022  5:13 PM      CONTINUE these medications which have NOT CHANGED    Details   albuterol (PROVENTIL HFA,VENTOLIN HFA) 90 mcg/act inhaler Inhale 2 puffs every 6 (six) hours as needed for wheezing or shortness of breath, Starting Thu 4/7/2022, Normal      amLODIPine (NORVASC) 10 mg tablet Take 1 tablet (10 mg total) by mouth daily, Starting Thu 4/7/2022, Normal      ascorbic acid (VITAMIN C) 250 mg tablet Take 1 tablet (250 mg total) by mouth daily, Starting Wed 4/13/2022, Normal      atorvastatin (LIPITOR) 10 mg tablet Take 1 tablet (10 mg total) by mouth in the morning , Starting Thu 5/19/2022, Normal      Blood Glucose Monitoring Suppl (OneTouch Verio) w/Device KIT Use 2 (two) times a day Testing sugars 2xs daily, Starting Sat 7/16/2022, Normal      Blood Pressure KIT by Does not apply route daily, Starting Mon 1/28/2019, Print      cetirizine (ZyrTEC) 10 mg tablet Take 1 tablet (10 mg total) by mouth daily, Starting Wed 7/13/2022, Normal      ferrous sulfate 324 (65 Fe) mg Take 1 tablet (324 mg total) by mouth every other day, Starting Wed 4/13/2022, Normal      fluticasone (FLONASE) 50 mcg/act nasal spray 1 spray into each nostril daily, Starting Wed 7/13/2022, Normal      glucose blood (OneTouch Verio) test strip Testing sugars 2xs daily, Normal      metFORMIN (GLUCOPHAGE) 500 mg tablet Take 1 tablet (500 mg total) by mouth 2 (two) times a day with meals, Starting Fri 7/15/2022, Normal      ondansetron (Zofran ODT) 4 mg disintegrating tablet Take 1 tablet (4 mg total) by mouth every 6 (six) hours as needed for nausea or vomiting for up to 6 doses, Starting Tue 5/17/2022, Normal      OneTouch Delica Lancets 44C MISC Use 2 (two) times a day Testing sugars 2xs daily, Starting Sat 7/16/2022, Normal      pantoprazole (PROTONIX) 40 mg tablet Take 1 tablet (40 mg total) by mouth every 12 (twelve) hours for 14 days, Starting Tue 6/28/2022, Until Tue 7/12/2022, Normal      saccharomyces boulardii (FLORASTOR) 250 mg capsule Take 1 capsule (250 mg total) by mouth 2 (two) times a day for 20 days, Starting Tue 6/28/2022, Until Mon 7/18/2022, Normal      silver sulfadiazine (SILVADENE,SSD) 1 % cream Apply topically daily, Starting Tue 8/11/2020, Normal      Vitamin E 100 units TABS Take 0 5 tablets (50 Units total) by mouth daily, Starting Tue 8/11/2020, Normal               PDMP Review       Value Time User    PDMP Reviewed  Yes 4/30/2020  3:56 PM Perry Sommers MD           ED Provider  Attending physically available and evaluated Chayo Diana I managed the patient along with the ED Attending      Electronically Signed by         Lizandro Burroughs MD  07/16/22 7998

## 2022-07-18 ENCOUNTER — TELEPHONE (OUTPATIENT)
Dept: FAMILY MEDICINE CLINIC | Facility: CLINIC | Age: 37
End: 2022-07-18

## 2022-07-18 NOTE — TELEPHONE ENCOUNTER
Pt contacted office requesting a sooner appt than 8/11/22 with Dr Sarthak Alejandra for the reason of her BS being high in the 300's  Pt was seen in the ER on 7/16/22  Please advice   Pt was informed will be receiving a phone call from nurse in regards of her request

## 2022-07-18 NOTE — TELEPHONE ENCOUNTER
I called pt and offered her an appt for 7/25/22 at 8am pt also still wanted to keep her appt in august

## 2022-07-24 NOTE — ASSESSMENT & PLAN NOTE
· Urea breath test positive on 6/24/2022  · Patient symptomatic with epigastric discomfort and GERD symptoms  · Triple therapy started on 6/28/2022  (Amoxicillin 1000 mg BID for 14 days, Clarithromycin 500 mg BID for 14 days and Pantoprazole 40 mg BID for 14 days -completed  · Will get repeat urea breath test in 2 weeks    Order placed

## 2022-07-24 NOTE — ASSESSMENT & PLAN NOTE
Lab Results   Component Value Date    HGBA1C 10 1 (A) 07/13/2022   · Last A1c prior was 6 8  · Fasting improved in 1 week  Sugars are as follow 325->326->446->288->259->242->267->254  · Actively trying to lose weight then change dietary habits  Has lost 6 lb since last visit  · Will retest A1c   · Pamphlets about program administered along with Education about nutrition  · Continue good oral hydration  · Diabetic foot exam performed 7/13  · Encouraged A1c recheck  · Endocrinology referral placed while in ED and scheduled for October  However do not know if it is needed at this time  · Has started Metformin 500 mg BID and will increased to 1000 mg b i d    At next visit   · Will do diabetes cares intake on 08/11

## 2022-07-24 NOTE — ASSESSMENT & PLAN NOTE
· Improving  · One week history of bilateral lower extremity paresthesias  · myles with occasional muscle cramping  · Has been drinking 64 oz of water daily, likely secondary to progressively worsening type 2 diabetes mellitus  · Not likely due to foot wear as patient wears sneakers with well-supported arches on a daily basis  · Diminished sensation on the dorsal aspect of her bilateral feet on diabetic foot exam  · Negative Homans sign  Calves are equal bilaterally in size  Does have a family history of blood clots specifically on her mother side  Wells score for DVT 0  Likely diagnosis is  · CMP normal per recent ED visit    Advised take get magnesium level  · Discussed starting gabapentin 3 times daily however do not wish to add more medications to her current regimen

## 2022-07-24 NOTE — ASSESSMENT & PLAN NOTE
· Lipid panel 06/24/2022- Cholesterol-222, triglycerides-98, HDL-42, LDL-160  · Continue atorvastatin 10 mg

## 2022-07-24 NOTE — PROGRESS NOTES
[facility]   [address] [facility]  [place], [address]  Phone#  [phone]  Fax#  [phone]    ASSESSMENT and PLAN  Type 2 diabetes mellitus without complication, without long-term current use of insulin ([facility] )    Lab Results   Component Value Date    HGBA1C 10 1 (A) 07/13/2022   · Last A1c prior was 6 8  · Fasting improved in 1 week  Sugars are as follow 325->326->446->288->259->242->267->254  · Actively trying to lose weight then change dietary habits  Has lost 6 lb since last visit  · Will retest A1c   · Pamphlets about program administered along with Education about nutrition  · Continue good oral hydration  · Diabetic foot exam performed 7/13  · Encouraged A1c recheck  · Endocrinology referral placed while in ED and scheduled for October  However do not know if it is needed at this time  · Has started Metformin 500 mg BID and will increased to 1000 mg b i d  At next visit   · Will do diabetes cares intake on 08/11    H  pylori infection  · Urea breath test positive on 6/24/2022  · Patient symptomatic with epigastric discomfort and GERD symptoms  · Triple therapy started on 6/28/2022  (Amoxicillin 1000 mg BID for 14 days, Clarithromycin 500 mg BID for 14 days and Pantoprazole 40 mg BID for 14 days -completed  · Will get repeat urea breath test in 2 weeks  Order placed    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 ([facility] )  · Improving  · One week history of bilateral lower extremity paresthesias  · myles with occasional muscle cramping  · Has been drinking 64 oz of water daily, likely secondary to progressively worsening type 2 diabetes mellitus  · Not likely due to foot wear as patient wears sneakers with well-supported arches on a daily basis  · Diminished sensation on the dorsal aspect of her bilateral feet on diabetic foot exam  · Negative Homans sign  Calves are equal bilaterally in size    Does have a family history of blood clots specifically on her mother side  Wells score for DVT 0  Likely diagnosis is  · CMP normal per recent ED visit  Advised take get magnesium level  · Discussed starting gabapentin 3 times daily however do not wish to add more medications to her current regimen    Mixed hyperlipidemia  · Lipid panel 06/24/2022- Cholesterol-222, triglycerides-98, HDL-42, LDL-160  · Continue atorvastatin 10 mg     Diagnoses and all orders for this visit:    Type 2 diabetes mellitus without complication, without long-term current use of insulin (HCC)  -     H  pylori breath test; Future    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 ([facility])      HISTORY OF PRESENT ILLNESS  Catrina Martin is a very pleasant 39 y o  female with a significant PMHx of GERD, type 2 diabetes mellitus, mild intermittent asthma, hypertension, anxiety, depression,  who presents to the clinic follow-up of diabetes mellitus type 2,  Since last visit, patient was recently seen the emergency department for hyperglycemia  She knows her blood sugars ranging in the 400s and was concerned decided to come to the emergency department  In the emergency department she received 1 L  Bolus and was discharged  She was started on metformin 500 mg b i d  As discussed  Today patient states, the numbness has improved and she has been documenting her blood sugars  She states that she has altered her diet however her weight has remained stable  Smoking/Alcohol/Illicit Drug Use:  Does admit to smoking 5 cigarettes per day  Works as a  for high school girls  REVIEW OF SYSTEMS  Review of Systems   Constitutional: Negative for chills, fatigue and fever  HENT: Negative for sore throat  Respiratory: Negative for cough, chest tightness and shortness of breath  Cardiovascular: Negative for chest pain and leg swelling  Gastrointestinal: Negative for constipation, diarrhea, nausea and vomiting  Endocrine: Negative for polydipsia, polyphagia and polyuria  Genitourinary: Negative for dysuria  Musculoskeletal: Negative for arthralgias  Skin: Negative for rash  Neurological: Negative for dizziness, light-headedness, numbness and headaches  Psychiatric/Behavioral: Negative for agitation and behavioral problems  PAST MEDICAL HISTORY   Past Medical History:   Diagnosis Date    Dental abscess 10/23/2020    Pseudotumor cerebri      Past Surgical History:   Procedure Laterality Date    TONSILLECTOMY       Social History     Socioeconomic History    Marital status: Single     Spouse name: Not on file    Number of children: Not on file    Years of education: Not on file    Highest education level: Not on file   Occupational History    Not on file   Tobacco Use    Smoking status: Current Every Day Smoker     Packs/day: 0 25     Types: Cigarettes    Smokeless tobacco: Never Used    Tobacco comment: pt quit 27 days ago   Vaping Use    Vaping Use: Never used   Substance and Sexual Activity    Alcohol use: No    Drug use: No    Sexual activity: Not on file   Other Topics Concern    Not on file   Social History Narrative    Not on file     Social Determinants of Health     Financial Resource Strain: Low Risk     Difficulty of Paying Living Expenses: Not hard at all   Food Insecurity: No Food Insecurity    Worried About Running Out of Food in the Last Year: Never true    Aleksey of Food in the Last Year: Never true   Transportation Needs: No Transportation Needs    Lack of Transportation (Medical): No    Lack of Transportation (Non-Medical): No   Physical Activity: Not on file   Stress: Not on file   Social Connections: Not on file   Intimate Partner Violence: Not on file   Housing Stability: Not on file     No family history on file      MEDICATIONS    Current Outpatient Medications:     albuterol (PROVENTIL HFA,VENTOLIN HFA) 90 mcg/act inhaler, Inhale 2 puffs every 6 (six) hours as needed for wheezing or shortness of breath, Disp: 6 7 g, Rfl: 0   amLODIPine (NORVASC) 10 mg tablet, Take 1 tablet (10 mg total) by mouth daily, Disp: 90 tablet, Rfl: 1    ascorbic acid (VITAMIN C) 250 mg tablet, Take 1 tablet (250 mg total) by mouth daily, Disp: 90 tablet, Rfl: 1    atorvastatin (LIPITOR) 10 mg tablet, Take 1 tablet (10 mg total) by mouth in the morning , Disp: 30 tablet, Rfl: 3    Blood Glucose Monitoring Suppl (OneTouch Verio) w/Device KIT, Use 2 (two) times a day Testing sugars 2xs daily, Disp: 1 kit, Rfl: 0    Blood Pressure KIT, by Does not apply route daily (Patient not taking: Reported on 7/15/2021), Disp: 1 each, Rfl: 0    cetirizine (ZyrTEC) 10 mg tablet, Take 1 tablet (10 mg total) by mouth daily, Disp: 30 tablet, Rfl: 3    ferrous sulfate 324 (65 Fe) mg, Take 1 tablet (324 mg total) by mouth every other day, Disp: 60 tablet, Rfl: 1    fluticasone (FLONASE) 50 mcg/act nasal spray, 1 spray into each nostril daily, Disp: 15 8 mL, Rfl: 1    glucose blood (OneTouch Verio) test strip, Testing sugars 2xs daily, Disp: 100 each, Rfl: 3    metFORMIN (GLUCOPHAGE) 500 mg tablet, Take 1 tablet (500 mg total) by mouth 2 (two) times a day with meals, Disp: 60 tablet, Rfl: 0    ondansetron (Zofran ODT) 4 mg disintegrating tablet, Take 1 tablet (4 mg total) by mouth every 6 (six) hours as needed for nausea or vomiting for up to 6 doses, Disp: 6 tablet, Rfl: 0    OneTouch Delica Lancets 01B MISC, Use 2 (two) times a day Testing sugars 2xs daily, Disp: 100 each, Rfl: 3    pantoprazole (PROTONIX) 40 mg tablet, Take 1 tablet (40 mg total) by mouth every 12 (twelve) hours for 14 days, Disp: 28 tablet, Rfl: 0    silver sulfadiazine (SILVADENE,SSD) 1 % cream, Apply topically daily (Patient not taking: Reported on 12/21/2020), Disp: 50 g, Rfl: 0    Vitamin E 100 units TABS, Take 0 5 tablets (50 Units total) by mouth daily, Disp: 30 tablet, Rfl: 0    PHYSICAL EXAM  Vitals:    07/25/22 0807   BP: 122/70   BP Location: Left arm   Patient Position: Sitting   Cuff Size: Standard   Pulse: 104   Resp: 18   Temp: 97 8 °F (36 6 °C)   TempSrc: Temporal   SpO2: 99%   Weight: 134 kg (294 lb 11 2 oz)   Height: 5' 7" (1 702 m)     Wt Readings from Last 3 Encounters:   07/25/22 134 kg (294 lb 11 2 oz)   07/13/22 134 kg (295 lb)   05/19/22 (!) 137 kg (302 lb)    , Body mass index is 46 16 kg/m²  Physical Exam  Constitutional:       Appearance: She is well-developed  HENT:      Head: Normocephalic and atraumatic  Eyes:      Pupils: Pupils are equal, round, and reactive to light  Cardiovascular:      Rate and Rhythm: Normal rate and regular rhythm  Heart sounds: Normal heart sounds  Pulmonary:      Effort: Pulmonary effort is normal       Breath sounds: Normal breath sounds  Abdominal:      General: Bowel sounds are normal       Palpations: Abdomen is soft  Musculoskeletal:         General: Normal range of motion  Cervical back: Normal range of motion and neck supple  Skin:     General: Skin is warm  Findings: No erythema or rash  Neurological:      Mental Status: She is alert and oriented to person, place, and time  Psychiatric:         Behavior: Behavior normal         foot exam    LABS/IMAGING  Hemoglobin A1C   Date Value Ref Range Status   07/13/2022 10 1 (A) 6 5 Final   06/09/2021 5 8 (H) Normal 3 8-5 6%; PreDiabetic 5 7-6 4%; Diabetic >=6 5%; Glycemic control for adults with diabetes <7 0% % Final   10/05/2018 6 0 (H) <5 7 % Final     Comment:     Reference Range  Non-diabetic                     <5 7  Pre-diabetic                     5 7-6 4  Diabetic                         >=6 5  ADA target for diabetic control  <=7     HDL, Direct   Date Value Ref Range Status   06/24/2022 42 (L) >=50 mg/dL Final     Comment:     Specimen collection should occur prior to Metamizole administration due to the potential for falsley depressed results       Triglycerides   Date Value Ref Range Status   06/24/2022 98 See Comment mg/dL Final     Comment: Triglyceride:     0-9Y            <75mg/dL     10Y-17Y         <90 mg/dL       >=18Y     Normal          <150 mg/dL     Borderline High 150-199 mg/dL     High            200-499 mg/dL        Very High       >499 mg/dL    Specimen collection should occur prior to N-Acetylcysteine or Metamizole administration due to the potential for falsely depressed results        WBC   Date Value Ref Range Status   07/16/2022 10 70 (H) 4 31 - 10 16 Thousand/uL Final   06/24/2022 9 08 4 31 - 10 16 Thousand/uL Final   05/17/2022 10 35 (H) 4 31 - 10 16 Thousand/uL Final     Hemoglobin   Date Value Ref Range Status   07/16/2022 12 0 11 5 - 15 4 g/dL Final   06/24/2022 11 0 (L) 11 5 - 15 4 g/dL Final   05/17/2022 11 2 (L) 11 5 - 15 4 g/dL Final     Platelets   Date Value Ref Range Status   07/16/2022 325 149 - 390 Thousands/uL Final   06/24/2022 318 149 - 390 Thousands/uL Final   05/17/2022 367 149 - 390 Thousands/uL Final     Potassium   Date Value Ref Range Status   07/16/2022 3 8 3 5 - 5 3 mmol/L Final   05/17/2022 3 5 3 5 - 5 3 mmol/L Final   04/07/2022 3 7 3 5 - 5 3 mmol/L Final     Chloride   Date Value Ref Range Status   07/16/2022 101 100 - 108 mmol/L Final   05/17/2022 108 100 - 108 mmol/L Final   04/07/2022 107 100 - 108 mmol/L Final     CO2   Date Value Ref Range Status   07/16/2022 22 21 - 32 mmol/L Final   05/17/2022 24 21 - 32 mmol/L Final   04/07/2022 26 21 - 32 mmol/L Final     BUN   Date Value Ref Range Status   07/16/2022 20 5 - 25 mg/dL Final   05/17/2022 13 5 - 25 mg/dL Final   04/07/2022 14 5 - 25 mg/dL Final     Creatinine   Date Value Ref Range Status   07/16/2022 1 16 0 60 - 1 30 mg/dL Final     Comment:     Standardized to IDMS reference method   05/17/2022 0 98 0 60 - 1 30 mg/dL Final     Comment:     Standardized to IDMS reference method   04/07/2022 1 01 0 60 - 1 30 mg/dL Final     Comment:     Standardized to IDMS reference method     eGFR   Date Value Ref Range Status   07/16/2022 60 ml/min/1 73sq m Final 05/17/2022 73 ml/min/1 73sq m Final   04/07/2022 71 ml/min/1 73sq m Final     Calcium   Date Value Ref Range Status   07/16/2022 9 3 8 3 - 10 1 mg/dL Final   05/17/2022 9 1 8 3 - 10 1 mg/dL Final   04/07/2022 9 2 8 3 - 10 1 mg/dL Final     AST   Date Value Ref Range Status   07/16/2022 9 5 - 45 U/L Final     Comment:     Specimen collection should occur prior to Sulfasalazine administration due to the potential for falsely depressed results  05/17/2022 10 5 - 45 U/L Final     Comment:     Specimen collection should occur prior to Sulfasalazine administration due to the potential for falsely depressed results  04/07/2022 9 5 - 45 U/L Final     Comment:     Specimen collection should occur prior to Sulfasalazine administration due to the potential for falsely depressed results  ALT   Date Value Ref Range Status   07/16/2022 19 12 - 78 U/L Final     Comment:     Specimen collection should occur prior to Sulfasalazine and/or Sulfapyridine administration due to the potential for falsely depressed results  05/17/2022 17 12 - 78 U/L Final     Comment:     Specimen collection should occur prior to Sulfasalazine and/or Sulfapyridine administration due to the potential for falsely depressed results  04/07/2022 14 12 - 78 U/L Final     Comment:     Specimen collection should occur prior to Sulfasalazine and/or Sulfapyridine administration due to the potential for falsely depressed results  Alkaline Phosphatase   Date Value Ref Range Status   07/16/2022 90 46 - 116 U/L Final   05/17/2022 84 46 - 116 U/L Final   04/07/2022 79 46 - 116 U/L Final     No results found for: TSH    This note has been dictated using [employer] software  It may contain errors, including improperly dictated words  Please contact physician directly for any questions       Lobo Day MD   PGY-3

## 2022-07-25 ENCOUNTER — OFFICE VISIT (OUTPATIENT)
Dept: FAMILY MEDICINE CLINIC | Facility: CLINIC | Age: 37
End: 2022-07-25

## 2022-07-25 ENCOUNTER — TELEPHONE (OUTPATIENT)
Dept: FAMILY MEDICINE CLINIC | Facility: CLINIC | Age: 37
End: 2022-07-25

## 2022-07-25 VITALS
SYSTOLIC BLOOD PRESSURE: 122 MMHG | OXYGEN SATURATION: 99 % | BODY MASS INDEX: 45.99 KG/M2 | TEMPERATURE: 97.8 F | WEIGHT: 293 LBS | DIASTOLIC BLOOD PRESSURE: 70 MMHG | HEIGHT: 67 IN | HEART RATE: 104 BPM | RESPIRATION RATE: 18 BRPM

## 2022-07-25 DIAGNOSIS — E11.9 TYPE 2 DIABETES MELLITUS WITHOUT COMPLICATION, WITHOUT LONG-TERM CURRENT USE OF INSULIN (HCC): Primary | ICD-10-CM

## 2022-07-25 DIAGNOSIS — G63 PERIPHERAL NEUROPATHY DUE TO METABOLIC DISORDER (HCC): ICD-10-CM

## 2022-07-25 DIAGNOSIS — E88.9 PERIPHERAL NEUROPATHY DUE TO METABOLIC DISORDER (HCC): ICD-10-CM

## 2022-07-25 PROCEDURE — 3074F SYST BP LT 130 MM HG: CPT | Performed by: FAMILY MEDICINE

## 2022-07-25 PROCEDURE — 99213 OFFICE O/P EST LOW 20 MIN: CPT | Performed by: FAMILY MEDICINE

## 2022-07-25 PROCEDURE — 3078F DIAST BP <80 MM HG: CPT | Performed by: FAMILY MEDICINE

## 2022-08-04 ENCOUNTER — APPOINTMENT (EMERGENCY)
Dept: RADIOLOGY | Facility: HOSPITAL | Age: 37
End: 2022-08-04
Payer: COMMERCIAL

## 2022-08-04 ENCOUNTER — HOSPITAL ENCOUNTER (EMERGENCY)
Facility: HOSPITAL | Age: 37
Discharge: HOME | End: 2022-08-04
Attending: EMERGENCY MEDICINE | Admitting: EMERGENCY MEDICINE
Payer: COMMERCIAL

## 2022-08-04 VITALS
TEMPERATURE: 102.7 F | SYSTOLIC BLOOD PRESSURE: 136 MMHG | RESPIRATION RATE: 20 BRPM | DIASTOLIC BLOOD PRESSURE: 59 MMHG | OXYGEN SATURATION: 98 % | HEART RATE: 101 BPM

## 2022-08-04 DIAGNOSIS — U07.1 COVID: Primary | ICD-10-CM

## 2022-08-04 DIAGNOSIS — M25.552 LEFT HIP PAIN: ICD-10-CM

## 2022-08-04 LAB
ALBUMIN SERPL BCP-MCNC: 3.7 G/DL (ref 3.5–5)
ALP SERPL-CCNC: 65 U/L (ref 46–116)
ALT SERPL W P-5'-P-CCNC: 13 U/L (ref 12–78)
ANION GAP SERPL CALCULATED.3IONS-SCNC: 7 MMOL/L (ref 4–13)
APTT PPP: 23 SECONDS (ref 23–37)
AST SERPL W P-5'-P-CCNC: 5 U/L (ref 5–45)
BASOPHILS # BLD AUTO: 0.02 THOUSANDS/ΜL (ref 0–0.1)
BASOPHILS NFR BLD AUTO: 0 % (ref 0–1)
BILIRUB SERPL-MCNC: 0.16 MG/DL (ref 0.2–1)
BUN SERPL-MCNC: 10 MG/DL (ref 5–25)
CALCIUM SERPL-MCNC: 9 MG/DL (ref 8.3–10.1)
CHLORIDE SERPL-SCNC: 107 MMOL/L (ref 96–108)
CO2 SERPL-SCNC: 23 MMOL/L (ref 21–32)
CREAT SERPL-MCNC: 1.02 MG/DL (ref 0.6–1.3)
CRP SERPL HS-MCNC: 33.5 MG/L
EOSINOPHIL # BLD AUTO: 0.05 THOUSAND/ΜL (ref 0–0.61)
EOSINOPHIL NFR BLD AUTO: 1 % (ref 0–6)
ERYTHROCYTE [DISTWIDTH] IN BLOOD BY AUTOMATED COUNT: 13.1 % (ref 11.6–15.1)
ERYTHROCYTE [SEDIMENTATION RATE] IN BLOOD: 31 MM/HOUR (ref 0–19)
FLUAV RNA RESP QL NAA+PROBE: NEGATIVE
FLUBV RNA RESP QL NAA+PROBE: NEGATIVE
GFR SERPL CREATININE-BSD FRML MDRD: 70 ML/MIN/1.73SQ M
GLUCOSE SERPL-MCNC: 185 MG/DL (ref 65–140)
HCT VFR BLD AUTO: 33.6 % (ref 34.8–46.1)
HGB BLD-MCNC: 10.8 G/DL (ref 11.5–15.4)
IMM GRANULOCYTES # BLD AUTO: 0.08 THOUSAND/UL (ref 0–0.2)
IMM GRANULOCYTES NFR BLD AUTO: 1 % (ref 0–2)
INR PPP: 1.01 (ref 0.84–1.19)
LACTATE SERPL-SCNC: 1.5 MMOL/L (ref 0.5–2)
LYMPHOCYTES # BLD AUTO: 0.68 THOUSANDS/ΜL (ref 0.6–4.47)
LYMPHOCYTES NFR BLD AUTO: 9 % (ref 14–44)
MCH RBC QN AUTO: 27.3 PG (ref 26.8–34.3)
MCHC RBC AUTO-ENTMCNC: 32.1 G/DL (ref 31.4–37.4)
MCV RBC AUTO: 85 FL (ref 82–98)
MONOCYTES # BLD AUTO: 0.53 THOUSAND/ΜL (ref 0.17–1.22)
MONOCYTES NFR BLD AUTO: 7 % (ref 4–12)
NEUTROPHILS # BLD AUTO: 6.13 THOUSANDS/ΜL (ref 1.85–7.62)
NEUTS SEG NFR BLD AUTO: 82 % (ref 43–75)
NRBC BLD AUTO-RTO: 0 /100 WBCS
PLATELET # BLD AUTO: 218 THOUSANDS/UL (ref 149–390)
PMV BLD AUTO: 12.1 FL (ref 8.9–12.7)
POTASSIUM SERPL-SCNC: 3.7 MMOL/L (ref 3.5–5.3)
PROCALCITONIN SERPL-MCNC: 0.08 NG/ML
PROT SERPL-MCNC: 7.7 G/DL (ref 6.4–8.4)
PROTHROMBIN TIME: 13.5 SECONDS (ref 11.6–14.5)
RBC # BLD AUTO: 3.95 MILLION/UL (ref 3.81–5.12)
RSV RNA RESP QL NAA+PROBE: NEGATIVE
SARS-COV-2 RNA RESP QL NAA+PROBE: POSITIVE
SODIUM SERPL-SCNC: 137 MMOL/L (ref 135–147)
WBC # BLD AUTO: 7.49 THOUSAND/UL (ref 4.31–10.16)

## 2022-08-04 PROCEDURE — 83605 ASSAY OF LACTIC ACID: CPT | Performed by: EMERGENCY MEDICINE

## 2022-08-04 PROCEDURE — 72193 CT PELVIS W/DYE: CPT

## 2022-08-04 PROCEDURE — 85610 PROTHROMBIN TIME: CPT | Performed by: EMERGENCY MEDICINE

## 2022-08-04 PROCEDURE — 0241U HB NFCT DS VIR RESP RNA 4 TRGT: CPT | Performed by: EMERGENCY MEDICINE

## 2022-08-04 PROCEDURE — 85652 RBC SED RATE AUTOMATED: CPT | Performed by: EMERGENCY MEDICINE

## 2022-08-04 PROCEDURE — 99284 EMERGENCY DEPT VISIT MOD MDM: CPT

## 2022-08-04 PROCEDURE — 99285 EMERGENCY DEPT VISIT HI MDM: CPT | Performed by: EMERGENCY MEDICINE

## 2022-08-04 PROCEDURE — 84145 PROCALCITONIN (PCT): CPT | Performed by: EMERGENCY MEDICINE

## 2022-08-04 PROCEDURE — 73502 X-RAY EXAM HIP UNI 2-3 VIEWS: CPT

## 2022-08-04 PROCEDURE — 80053 COMPREHEN METABOLIC PANEL: CPT | Performed by: EMERGENCY MEDICINE

## 2022-08-04 PROCEDURE — 86141 C-REACTIVE PROTEIN HS: CPT | Performed by: EMERGENCY MEDICINE

## 2022-08-04 PROCEDURE — 96366 THER/PROPH/DIAG IV INF ADDON: CPT

## 2022-08-04 PROCEDURE — 85730 THROMBOPLASTIN TIME PARTIAL: CPT | Performed by: EMERGENCY MEDICINE

## 2022-08-04 PROCEDURE — 93005 ELECTROCARDIOGRAM TRACING: CPT

## 2022-08-04 PROCEDURE — 96365 THER/PROPH/DIAG IV INF INIT: CPT

## 2022-08-04 PROCEDURE — 36415 COLL VENOUS BLD VENIPUNCTURE: CPT | Performed by: EMERGENCY MEDICINE

## 2022-08-04 PROCEDURE — G1004 CDSM NDSC: HCPCS

## 2022-08-04 PROCEDURE — 85025 COMPLETE CBC W/AUTO DIFF WBC: CPT | Performed by: EMERGENCY MEDICINE

## 2022-08-04 RX ORDER — ACETAMINOPHEN 325 MG/1
975 TABLET ORAL ONCE
Status: COMPLETED | OUTPATIENT
Start: 2022-08-04 | End: 2022-08-04

## 2022-08-04 RX ORDER — LIDOCAINE 50 MG/G
1 PATCH TOPICAL ONCE
Status: DISCONTINUED | OUTPATIENT
Start: 2022-08-04 | End: 2022-08-05 | Stop reason: HOSPADM

## 2022-08-04 RX ADMIN — ACETAMINOPHEN 975 MG: 325 TABLET ORAL at 18:40

## 2022-08-04 RX ADMIN — IOHEXOL 97 ML: 350 INJECTION, SOLUTION INTRAVENOUS at 20:13

## 2022-08-04 RX ADMIN — LIDOCAINE 5% 1 PATCH: 700 PATCH TOPICAL at 18:40

## 2022-08-04 RX ADMIN — SODIUM CHLORIDE, SODIUM LACTATE, POTASSIUM CHLORIDE, AND CALCIUM CHLORIDE 1000 ML: .6; .31; .03; .02 INJECTION, SOLUTION INTRAVENOUS at 19:08
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
## 2022-08-05 LAB
ATRIAL RATE: 109 BPM
P AXIS: 31 DEGREES
PR INTERVAL: 118 MS
QRS AXIS: 58 DEGREES
QRSD INTERVAL: 82 MS
QT INTERVAL: 320 MS
QTC INTERVAL: 430 MS
T WAVE AXIS: 32 DEGREES
VENTRICULAR RATE: 109 BPM

## 2022-08-05 PROCEDURE — 93010 ELECTROCARDIOGRAM REPORT: CPT | Performed by: INTERNAL MEDICINE

## 2022-08-05 NOTE — DISCHARGE INSTRUCTIONS
You have been seen for COVID and hip pain  You should return to the ED if you develop high fevers, trouble breathing, inability to walk, or other worsening symptoms  Follow up with your primary care physician  See Orthopedics if the hip pain persists  Take Tylenol or NSAIDs (Motrin, Advil, Aleve, or ibuprofen) for pain

## 2022-08-11 ENCOUNTER — OFFICE VISIT (OUTPATIENT)
Dept: FAMILY MEDICINE CLINIC | Facility: CLINIC | Age: 37
End: 2022-08-11

## 2022-08-11 VITALS
HEIGHT: 67 IN | RESPIRATION RATE: 18 BRPM | TEMPERATURE: 96.7 F | BODY MASS INDEX: 45.04 KG/M2 | WEIGHT: 287 LBS | SYSTOLIC BLOOD PRESSURE: 110 MMHG | DIASTOLIC BLOOD PRESSURE: 70 MMHG | HEART RATE: 110 BPM | OXYGEN SATURATION: 98 %

## 2022-08-11 DIAGNOSIS — E11.9 TYPE 2 DIABETES MELLITUS WITHOUT COMPLICATION, WITHOUT LONG-TERM CURRENT USE OF INSULIN (HCC): Primary | ICD-10-CM

## 2022-08-11 DIAGNOSIS — Z00.00 HEALTHCARE MAINTENANCE: ICD-10-CM

## 2022-08-11 DIAGNOSIS — D50.9 IRON DEFICIENCY ANEMIA, UNSPECIFIED IRON DEFICIENCY ANEMIA TYPE: ICD-10-CM

## 2022-08-11 DIAGNOSIS — A04.8 H. PYLORI INFECTION: ICD-10-CM

## 2022-08-11 DIAGNOSIS — I10 BENIGN ESSENTIAL HTN: ICD-10-CM

## 2022-08-11 PROBLEM — K57.90 DIVERTICULOSIS: Status: ACTIVE | Noted: 2022-08-11

## 2022-08-11 PROBLEM — E87.6 HYPOKALEMIA: Status: RESOLVED | Noted: 2020-06-08 | Resolved: 2022-08-11

## 2022-08-11 PROCEDURE — 99213 OFFICE O/P EST LOW 20 MIN: CPT | Performed by: FAMILY MEDICINE

## 2022-08-11 PROCEDURE — 3078F DIAST BP <80 MM HG: CPT | Performed by: FAMILY MEDICINE

## 2022-08-11 PROCEDURE — 3074F SYST BP LT 130 MM HG: CPT | Performed by: FAMILY MEDICINE

## 2022-08-11 NOTE — ASSESSMENT & PLAN NOTE
· Most recent CBC with Hgb at 10 8 (previous 12)  · Not currently taking Iron supplementation  · Encouraged diet with Iron-foods discussed  · Will recheck CBC in 3 months

## 2022-08-11 NOTE — ASSESSMENT & PLAN NOTE
· Urea breath test positive on 6/24/2022  · Patient symptomatic with epigastric discomfort and GERD symptoms  · Triple therapy started on 6/28/2022   (Amoxicillin 1000 mg BID for 14 days, Clarithromycin 500 mg BID for 14 days and Pantoprazole 40 mg BID for 14 days -completed  · Will get repeat urea breath test

## 2022-08-11 NOTE — ASSESSMENT & PLAN NOTE
· BP today 110/70    At goal per JNC 8  · Previous and amlodipine however blood pressure seems to be controlled without medication currently  · Will take off Amlodipine off medication list  · Continue to monitor BP's during office visits

## 2022-08-11 NOTE — PROGRESS NOTES
[facility]   [address]  [place], [address]  Phone#  [phone]  Fax#  [phone]    ASSESSMENT and PLAN  Type 2 diabetes mellitus without complication, without long-term current use of insulin ([facility] [address] )    Lab Results   Component Value Date    HGBA1C 10 1 (A) 07/13/2022   · Last A1c prior was 6 8  · Fasting sugars consistently in the 250's  Asked to bring log to next appointment   · Actively trying to lose weight then change dietary habits  Has lost close to 20 pounds  · Pamphlets about program administered along with Education about nutrition  · Unsure if [facility] is actively enrolling patients but will put necessary referrals in  · Continue good oral hydration  · Diabetic foot exam performed 7/13  · Ophthalmologic evaluation within the next month secondary to Pseudotumor cerebri  · Endocrinology referral placed in ED, scheduled for October  · Referral to Diabetes Education, Nutrition, Medical Fitness  Consent not signed  · Increase Metformin to 1,000 mg BID from 500  Will see how she tolerates  · RTC in 1 month with blood glucose logs  · Next A1C in 2 months    Benign essential HTN  · BP today 110/70  At goal per J[facility] 8  · Previous and amlodipine however blood pressure seems to be controlled without medication currently  · Will take off Amlodipine off medication list  · Continue to monitor BP's during office visits    H  pylori infection  · Urea breath test positive on 6/24/2022  · Patient symptomatic with epigastric discomfort and GERD symptoms  · Triple therapy started on 6/28/2022   (Amoxicillin 1000 mg BID for 14 days, Clarithromycin 500 mg BID for 14 days and Pantoprazole 40 mg BID for 14 days -completed  · Will get repeat urea breath test    Iron deficiency anemia  · Most recent CBC with Hgb at 10 8 (previous 12)  · Not currently taking Iron supplementation  · Encouraged diet with Iron-foods discussed  · Will recheck CBC in 3 months Healthcare maintenance  · Last Pap 5/26/2022: ASCUS with negative HPV  Recommend repeat HPV + Cytology in 3 years    Diverticulosis  · CT Pelvis 8/2022: Diverticulosis      Diagnoses and all orders for this visit:    Type 2 diabetes mellitus without complication, without long-term current use of insulin (HCC)  -     metFORMIN (GLUCOPHAGE) 1000 MG tablet; Take 1 tablet (1,000 mg total) by mouth 2 (two) times a day with meals  -     Ambulatory Referral to Diabetic Education  -     Ambulatory Referral to Nutrition Services; Future    Benign essential HTN    Healthcare maintenance    H  pylori infection    Iron deficiency anemia, unspecified iron deficiency anemia type        HISTORY OF PRESENT ILLNESS  Catrina Martin is a 44 y o  female with a significant PMHx of DMII, GERD,  who presents to the clinic for  management of their chronic medical conditions  Patient's medical conditions are stable unless noted otherwise above  Patient was recently emergency department on 08/04 with left hip pain  X-ray was unremarkable however she was referred to orthopedics  She has an appointment tomorrow  At that time she was subsequently tested for covid and was tested positive however she did not have any symptoms  She states today that she just has a loss of appetite that occurred over the course of last 2 weeks  She checked her sugars this morning which ranging from 240-50  Smoking/alcohol/illicit drug use: down to 4/5 cigarettes per day from 1/2 pack    REVIEW OF SYSTEMS  Review of Systems   Constitutional: Negative for chills, fatigue and fever  HENT: Negative for sore throat  Respiratory: Negative for cough, chest tightness and shortness of breath  Cardiovascular: Negative for chest pain and leg swelling  Gastrointestinal: Negative for constipation, diarrhea, nausea and vomiting  Endocrine: Negative for polydipsia, polyphagia and polyuria  Genitourinary: Negative for dysuria     Musculoskeletal: Negative for arthralgias  Skin: Negative for rash  Neurological: Negative for dizziness, light-headedness and headaches  Psychiatric/Behavioral: Negative for agitation and behavioral problems  PAST MEDICAL HISTORY   Past Medical History:   Diagnosis Date    Dental abscess 10/23/2020    Hypokalemia 6/8/2020    Pseudotumor cerebri      Past Surgical History:   Procedure Laterality Date    TONSILLECTOMY       Social History     Socioeconomic History    Marital status: Single     Spouse name: Not on file    Number of children: Not on file    Years of education: Not on file    Highest education level: Not on file   Occupational History    Not on file   Tobacco Use    Smoking status: Current Every Day Smoker     Packs/day: 0 25     Types: Cigarettes    Smokeless tobacco: Never Used    Tobacco comment: pt quit 27 days ago   Vaping Use    Vaping Use: Never used   Substance and Sexual Activity    Alcohol use: No    Drug use: No    Sexual activity: Not on file   Other Topics Concern    Not on file   Social History Narrative    Not on file     Social Determinants of Health     Financial Resource Strain: Low Risk     Difficulty of Paying Living Expenses: Not hard at all   Food Insecurity: No Food Insecurity    Worried About Running Out of Food in the Last Year: Never true    Aleksey of Food in the Last Year: Never true   Transportation Needs: No Transportation Needs    Lack of Transportation (Medical): No    Lack of Transportation (Non-Medical): No   Physical Activity: Not on file   Stress: Not on file   Social Connections: Not on file   Intimate Partner Violence: Not on file   Housing Stability: Not on file     No family history on file      MEDICATIONS    Current Outpatient Medications:     metFORMIN (GLUCOPHAGE) 1000 MG tablet, Take 1 tablet (1,000 mg total) by mouth 2 (two) times a day with meals, Disp: 90 tablet, Rfl: 0    albuterol (PROVENTIL HFA,VENTOLIN HFA) 90 mcg/act inhaler, Inhale 2 puffs every 6 (six) hours as needed for wheezing or shortness of breath, Disp: 6 7 g, Rfl: 0    atorvastatin (LIPITOR) 10 mg tablet, Take 1 tablet (10 mg total) by mouth in the morning , Disp: 30 tablet, Rfl: 3    Blood Glucose Monitoring Suppl (OneTouch Verio) w/Device KIT, Use 2 (two) times a day Testing sugars 2xs daily, Disp: 1 kit, Rfl: 0    Blood Pressure KIT, by Does not apply route daily (Patient not taking: Reported on 7/15/2021), Disp: 1 each, Rfl: 0    cetirizine (ZyrTEC) 10 mg tablet, Take 1 tablet (10 mg total) by mouth daily, Disp: 30 tablet, Rfl: 3    ferrous sulfate 324 (65 Fe) mg, Take 1 tablet (324 mg total) by mouth every other day, Disp: 60 tablet, Rfl: 1    glucose blood (OneTouch Verio) test strip, Testing sugars 2xs daily, Disp: 100 each, Rfl: 3    OneTouch Delica Lancets 11E MISC, Use 2 (two) times a day Testing sugars 2xs daily, Disp: 100 each, Rfl: 3    PHYSICAL EXAM  Vitals:    08/11/22 0947   BP: 110/70   BP Location: Left arm   Patient Position: Sitting   Cuff Size: Large   Pulse: (!) 110   Resp: 18   Temp: (!) 96 7 °F (35 9 °C)   TempSrc: Temporal   SpO2: 98%   Weight: 130 kg (287 lb)   Height: 5' 7" (1 702 m)     Wt Readings from Last 3 Encounters:   08/11/22 130 kg (287 lb)   07/25/22 134 kg (294 lb 11 2 oz)   07/13/22 134 kg (295 lb)    , Body mass index is 44 95 kg/m²  Physical Exam  Constitutional:       Appearance: She is well-developed  HENT:      Head: Normocephalic and atraumatic  Eyes:      Pupils: Pupils are equal, round, and reactive to light  Cardiovascular:      Rate and Rhythm: Normal rate and regular rhythm  Heart sounds: Normal heart sounds  Pulmonary:      Effort: Pulmonary effort is normal       Breath sounds: Normal breath sounds  Abdominal:      General: Bowel sounds are normal       Palpations: Abdomen is soft  Musculoskeletal:         General: Normal range of motion  Cervical back: Normal range of motion and neck supple     Skin: General: Skin is warm  Findings: No erythema or rash  Neurological:      Mental Status: She is alert and oriented to person, place, and time  Psychiatric:         Behavior: Behavior normal          LABS/IMAGING  Hemoglobin A1C   Date Value Ref Range Status   07/13/2022 10 1 (A) 6 5 Final   06/09/2021 5 8 (H) Normal 3 8-5 6%; PreDiabetic 5 7-6 4%; Diabetic >=6 5%; Glycemic control for adults with diabetes <7 0% % Final   10/05/2018 6 0 (H) <5 7 % Final     Comment:     Reference Range  Non-diabetic                     <5 7  Pre-diabetic                     5 7-6 4  Diabetic                         >=6 5  ADA target for diabetic control  <=7     HDL, Direct   Date Value Ref Range Status   06/24/2022 42 (L) >=50 mg/dL Final     Comment:     Specimen collection should occur prior to Metamizole administration due to the potential for falsley depressed results  Triglycerides   Date Value Ref Range Status   06/24/2022 98 See Comment mg/dL Final     Comment:     Triglyceride:     0-9Y            <75mg/dL     10Y-17Y         <90 mg/dL       >=18Y     Normal          <150 mg/dL     Borderline High 150-199 mg/dL     High            200-499 mg/dL        Very High       >499 mg/dL    Specimen collection should occur prior to N-Acetylcysteine or Metamizole administration due to the potential for falsely depressed results        WBC   Date Value Ref Range Status   08/04/2022 7 49 4 31 - 10 16 Thousand/uL Final   07/16/2022 10 70 (H) 4 31 - 10 16 Thousand/uL Final   06/24/2022 9 08 4 31 - 10 16 Thousand/uL Final     Hemoglobin   Date Value Ref Range Status   08/04/2022 10 8 (L) 11 5 - 15 4 g/dL Final   07/16/2022 12 0 11 5 - 15 4 g/dL Final   06/24/2022 11 0 (L) 11 5 - 15 4 g/dL Final     Platelets   Date Value Ref Range Status   08/04/2022 218 149 - 390 Thousands/uL Final   07/16/2022 325 149 - 390 Thousands/uL Final   06/24/2022 318 149 - 390 Thousands/uL Final     Potassium   Date Value Ref Range Status   08/04/2022 3 7 3 5 - 5 3 mmol/L Final   07/16/2022 3 8 3 5 - 5 3 mmol/L Final   05/17/2022 3 5 3 5 - 5 3 mmol/L Final     Chloride   Date Value Ref Range Status   08/04/2022 107 96 - 108 mmol/L Final   07/16/2022 101 100 - 108 mmol/L Final   05/17/2022 108 100 - 108 mmol/L Final     CO2   Date Value Ref Range Status   08/04/2022 23 21 - 32 mmol/L Final   07/16/2022 22 21 - 32 mmol/L Final   05/17/2022 24 21 - 32 mmol/L Final     BUN   Date Value Ref Range Status   08/04/2022 10 5 - 25 mg/dL Final   07/16/2022 20 5 - 25 mg/dL Final   05/17/2022 13 5 - 25 mg/dL Final     Creatinine   Date Value Ref Range Status   08/04/2022 1 02 0 60 - 1 30 mg/dL Final     Comment:     Standardized to IDMS reference method   07/16/2022 1 16 0 60 - 1 30 mg/dL Final     Comment:     Standardized to IDMS reference method   05/17/2022 0 98 0 60 - 1 30 mg/dL Final     Comment:     Standardized to IDMS reference method     eGFR   Date Value Ref Range Status   08/04/2022 70 ml/min/1 73sq m Final   07/16/2022 60 ml/min/1 73sq m Final   05/17/2022 73 ml/min/1 73sq m Final     Calcium   Date Value Ref Range Status   08/04/2022 9 0 8 3 - 10 1 mg/dL Final   07/16/2022 9 3 8 3 - 10 1 mg/dL Final   05/17/2022 9 1 8 3 - 10 1 mg/dL Final     AST   Date Value Ref Range Status   08/04/2022 5 5 - 45 U/L Final     Comment:     Specimen collection should occur prior to Sulfasalazine administration due to the potential for falsely depressed results  07/16/2022 9 5 - 45 U/L Final     Comment:     Specimen collection should occur prior to Sulfasalazine administration due to the potential for falsely depressed results  05/17/2022 10 5 - 45 U/L Final     Comment:     Specimen collection should occur prior to Sulfasalazine administration due to the potential for falsely depressed results        ALT   Date Value Ref Range Status   08/04/2022 13 12 - 78 U/L Final     Comment:     Specimen collection should occur prior to Sulfasalazine and/or Sulfapyridine administration due to the potential for falsely depressed results  07/16/2022 19 12 - 78 U/L Final     Comment:     Specimen collection should occur prior to Sulfasalazine and/or Sulfapyridine administration due to the potential for falsely depressed results  05/17/2022 17 12 - 78 U/L Final     Comment:     Specimen collection should occur prior to Sulfasalazine and/or Sulfapyridine administration due to the potential for falsely depressed results  Alkaline Phosphatase   Date Value Ref Range Status   08/04/2022 65 46 - 116 U/L Final   07/16/2022 90 46 - 116 U/L Final   05/17/2022 84 46 - 116 U/L Final     No results found for: TSH    This note has been dictated using GeoMe software  It may contain errors, including improperly dictated words  Please contact physician directly for any questions       Cezar Hernandez MD   PGY-3

## 2022-08-11 NOTE — LETTER
Medical Fitness Referral    Patient: Kevin Padron  : 1985  MRN: [id number]  Address: [address] [place] [address]  Phone Number: [phone]  E-mail: [email or web address]    [x] Medical Disorders (Ex  Diabetes)   [x] Cardiovascular Disorders (Ex  Hypertension)   [x] Pulmonary Disorders (Ex  Asthma)   [] Cancer Disorders (Ex  Breast, Prostate)   [] Immunological & Hematological Disorders (Rheumatoid Arthritis)   [] Neurological Disorders (Ex  Parkinson's Disease)   [] Cognitive Disorders (Ex  Dementia)   [] Children & Adolescents (Ex  BMI)   [] Older Adults (Ex  Balance & Ambulation)   [] Female Specific Disorders (Ex  Osteoporosis)       Diagnoses:   1  Type 2 diabetes mellitus without complication, without long-term current use of insulin (HCC)  metFORMIN (GLUCOPHAGE) 1000 MG tablet    Ambulatory Referral to Diabetic Education    Ambulatory Referral to Nutrition Services   2  Benign essential HTN     3   Healthcare maintenance       Additional Comments:     Service Requested:  [x] Medical Fitness Consult- Screening For Appropriateness of Medical Fitness Program   [] Medical Fitness Program- Assessment of Body Composition, Aerobic, Anaerobic, Muscle Strength & Endurance, Flexibility, Neuromuscular & Functional Fitness   [] Medical Fitness Assessment- Individualized Evidence-Based Exercise Program       Requesting Provider: Arthur Sun MD  Date: 22

## 2022-08-12 ENCOUNTER — OFFICE VISIT (OUTPATIENT)
Dept: OBGYN CLINIC | Facility: HOSPITAL | Age: 37
End: 2022-08-12
Attending: EMERGENCY MEDICINE
Payer: COMMERCIAL

## 2022-08-12 VITALS
BODY MASS INDEX: 45.14 KG/M2 | DIASTOLIC BLOOD PRESSURE: 91 MMHG | SYSTOLIC BLOOD PRESSURE: 130 MMHG | HEART RATE: 102 BPM | HEIGHT: 67 IN | WEIGHT: 287.6 LBS

## 2022-08-12 DIAGNOSIS — S73.192A TEAR OF LEFT ACETABULAR LABRUM, INITIAL ENCOUNTER: Primary | ICD-10-CM

## 2022-08-12 DIAGNOSIS — M25.552 LEFT HIP PAIN: ICD-10-CM

## 2022-08-12 PROCEDURE — 3075F SYST BP GE 130 - 139MM HG: CPT | Performed by: PHYSICIAN ASSISTANT

## 2022-08-12 PROCEDURE — 3080F DIAST BP >= 90 MM HG: CPT | Performed by: PHYSICIAN ASSISTANT

## 2022-08-12 PROCEDURE — 99213 OFFICE O/P EST LOW 20 MIN: CPT | Performed by: PHYSICIAN ASSISTANT
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
## 2022-08-15 ENCOUNTER — HOSPITAL ENCOUNTER (OUTPATIENT)
Dept: RADIOLOGY | Facility: HOSPITAL | Age: 37
Discharge: HOME | End: 2022-08-15

## 2022-08-15 DIAGNOSIS — M25.552 LEFT HIP PAIN: ICD-10-CM

## 2022-08-29 ENCOUNTER — HOSPITAL ENCOUNTER (OUTPATIENT)
Dept: RADIOLOGY | Facility: HOSPITAL | Age: 37
Discharge: HOME | End: 2022-08-29

## 2022-09-09 ENCOUNTER — OFFICE VISIT (OUTPATIENT)
Dept: FAMILY MEDICINE CLINIC | Facility: CLINIC | Age: 37
End: 2022-09-09

## 2022-09-09 VITALS
BODY MASS INDEX: 45.67 KG/M2 | DIASTOLIC BLOOD PRESSURE: 80 MMHG | RESPIRATION RATE: 16 BRPM | HEART RATE: 102 BPM | TEMPERATURE: 97.4 F | SYSTOLIC BLOOD PRESSURE: 130 MMHG | OXYGEN SATURATION: 99 % | WEIGHT: 291 LBS | HEIGHT: 67 IN

## 2022-09-09 DIAGNOSIS — Z23 ENCOUNTER FOR IMMUNIZATION: ICD-10-CM

## 2022-09-09 DIAGNOSIS — D50.9 IRON DEFICIENCY ANEMIA, UNSPECIFIED IRON DEFICIENCY ANEMIA TYPE: ICD-10-CM

## 2022-09-09 DIAGNOSIS — Z00.00 HEALTHCARE MAINTENANCE: ICD-10-CM

## 2022-09-09 DIAGNOSIS — A04.8 H. PYLORI INFECTION: ICD-10-CM

## 2022-09-09 DIAGNOSIS — E11.9 TYPE 2 DIABETES MELLITUS WITHOUT COMPLICATION, WITHOUT LONG-TERM CURRENT USE OF INSULIN (HCC): Primary | ICD-10-CM

## 2022-09-09 LAB — SL AMB POCT GLUCOSE BLD: 187

## 2022-09-09 PROCEDURE — 3075F SYST BP GE 130 - 139MM HG: CPT | Performed by: FAMILY MEDICINE

## 2022-09-09 PROCEDURE — 82948 REAGENT STRIP/BLOOD GLUCOSE: CPT | Performed by: FAMILY MEDICINE

## 2022-09-09 PROCEDURE — 90682 RIV4 VACC RECOMBINANT DNA IM: CPT | Performed by: FAMILY MEDICINE

## 2022-09-09 PROCEDURE — 3079F DIAST BP 80-89 MM HG: CPT | Performed by: FAMILY MEDICINE

## 2022-09-09 PROCEDURE — 90471 IMMUNIZATION ADMIN: CPT | Performed by: FAMILY MEDICINE

## 2022-09-09 PROCEDURE — 99213 OFFICE O/P EST LOW 20 MIN: CPT | Performed by: FAMILY MEDICINE

## 2022-09-09 NOTE — PROGRESS NOTES
[facility]   [address], [address]  Phone#  [phone]  Fax#  [phone]    ASSESSMENT and PLAN  H  pylori infection  · Urea breath test positive on 6/24/2022  s  · Triple therapy started on 6/28/2022  (Amoxicillin 1000 mg BID for 14 days, Clarithromycin 500 mg BID for 14 days and Pantoprazole 40 mg BID for 14 days -completed  · Will get repeat urea breath test-called and notified patient who will get this tomorrow     Type 2 diabetes mellitus without complication, without long-term current use of insulin ([facility] [address] )    Lab Results   Component Value Date    HGBA1C 10 1 (A) 07/13/2022   · Last A1c prior was 6 8  · Fasting sugars have improved from 250's to 175-190  · Actively trying to lose weight then change dietary habits  Has lost close to 20 pounds  · Diabetic foot exam performed 7/13  · Ophthalmologic evaluation 9/13 with  Ophthalmology  · Endocrinology referral placed in ED, scheduled for October  · Continue Metformin 1,000 mg BID  Not tolerating as well as the 500  Will try and split doses  If not she will go back down to 500 mg BID   · Will check A1C in 1 month  Would like to add at this time a GLP-1    Benign essential HTN  · BP today 130/80  At goal per JNC 8  · Previous and amlodipine however blood pressure seems to be controlled without medication currently  · Amlodipine off medication list  · Continue to monitor BP's during office visits    Healthcare maintenance  · Last Pap 5/26/2022: ASCUS with negative HPV   Recommend repeat HPV + Cytology in 3 years  · Flu vaccine administered today    Iron deficiency anemia  · Most recent CBC with Hgb at 10 8 (previous 12)  · Not currently taking Iron supplementation  · Encouraged diet with Iron-foods discussed  · Will recheck CBC in 1 month    Diagnoses and all orders for this visit:    Type 2 diabetes mellitus without complication, without long-term current use of insulin ([facility])  -     POCT blood glucose    Encounter for immunization  -     influenza vaccine, quadrivalent, recombinant, PF, 0 5 mL, for patients 18 yr+ (FLUBLOK)    H  pylori infection    Healthcare maintenance    Iron deficiency anemia, unspecified iron deficiency anemia type      HISTORY OF PRESENT ILLNESS  Theodore Chambers is a 44 y o  female with a significant PMHx of DMII, HTN, CARLOS, HTN, Depression, Anxiety History  who presents to the clinic for  management of their chronic medical conditions  Patient doing well, stating that fasting blood sugars have been improving  Patient's medical conditions are stable unless noted otherwise above  Patient has not had any recent hospitalizations, or medical emergencies since last visit  Patient has no further complaints other than what is mentioned in the ROS  REVIEW OF SYSTEMS  Review of Systems   Constitutional: Negative for chills, fatigue and fever  HENT: Negative for sore throat  Respiratory: Negative for cough, chest tightness and shortness of breath  Cardiovascular: Negative for chest pain and leg swelling  Gastrointestinal: Negative for constipation, diarrhea, nausea and vomiting  Endocrine: Negative for polydipsia, polyphagia and polyuria  Genitourinary: Negative for dysuria  Musculoskeletal: Negative for arthralgias  Skin: Negative for rash  Neurological: Negative for dizziness, light-headedness and headaches  Psychiatric/Behavioral: Negative for agitation and behavioral problems         PAST MEDICAL HISTORY   Past Medical History:   Diagnosis Date    Dental abscess 10/23/2020    Hypokalemia 6/8/2020    Pseudotumor cerebri      Past Surgical History:   Procedure Laterality Date    TONSILLECTOMY       Social History     Socioeconomic History    Marital status: Single     Spouse name: Not on file    Number of children: Not on file    Years of education: Not on file    Highest education level: Not on file   Occupational History    Not on file   Tobacco Use    Smoking status: Current Every Day Smoker     Packs/day: 0 25     Types: Cigarettes    Smokeless tobacco: Never Used    Tobacco comment: pt quit 27 days ago   Vaping Use    Vaping Use: Never used   Substance and Sexual Activity    Alcohol use: No    Drug use: No    Sexual activity: Not on file   Other Topics Concern    Not on file   Social History Narrative    Not on file     Social Determinants of Health     Financial Resource Strain: Low Risk     Difficulty of Paying Living Expenses: Not hard at all   Food Insecurity: No Food Insecurity    Worried About Running Out of Food in the Last Year: Never true    Aleksey of Food in the Last Year: Never true   Transportation Needs: No Transportation Needs    Lack of Transportation (Medical): No    Lack of Transportation (Non-Medical): No   Physical Activity: Not on file   Stress: Not on file   Social Connections: Not on file   Intimate Partner Violence: Not on file   Housing Stability: Not on file     No family history on file      MEDICATIONS    Current Outpatient Medications:     albuterol (PROVENTIL HFA,VENTOLIN HFA) 90 mcg/act inhaler, Inhale 2 puffs every 6 (six) hours as needed for wheezing or shortness of breath, Disp: 6 7 g, Rfl: 0    atorvastatin (LIPITOR) 10 mg tablet, Take 1 tablet (10 mg total) by mouth in the morning , Disp: 30 tablet, Rfl: 3    Blood Glucose Monitoring Suppl (OneTouch Verio) w/Device KIT, Use 2 (two) times a day Testing sugars 2xs daily, Disp: 1 kit, Rfl: 0    Blood Pressure KIT, by Does not apply route daily (Patient not taking: No sig reported), Disp: 1 each, Rfl: 0    cetirizine (ZyrTEC) 10 mg tablet, Take 1 tablet (10 mg total) by mouth daily, Disp: 30 tablet, Rfl: 3    ferrous sulfate 324 (65 Fe) mg, Take 1 tablet (324 mg total) by mouth every other day, Disp: 60 tablet, Rfl: 1    glucose blood (OneTouch Verio) test strip, Testing sugars 2xs daily, Disp: 100 each, Rfl: 3    metFORMIN (GLUCOPHAGE) 1000 MG tablet, Take 1 tablet (1,000 mg total) by mouth 2 (two) times a day with meals, Disp: 90 tablet, Rfl: 0    OneTouch Delica Lancets 20S MISC, Use 2 (two) times a day Testing sugars 2xs daily, Disp: 100 each, Rfl: 3    PHYSICAL EXAM  Vitals:    09/09/22 1050   BP: 130/80   BP Location: Left arm   Patient Position: Sitting   Cuff Size: Extra-Large   Pulse: 102   Resp: 16   Temp: (!) 97 4 °F (36 3 °C)   TempSrc: Temporal   SpO2: 99%   Weight: 132 kg (291 lb)   Height: 5' 7" (1 702 m)     Wt Readings from Last 3 Encounters:   09/09/22 132 kg (291 lb)   08/12/22 130 kg (287 lb 9 6 oz)   08/11/22 130 kg (287 lb)    , Body mass index is 45 58 kg/m²  Physical Exam  Constitutional:       Appearance: She is well-developed  HENT:      Head: Normocephalic and atraumatic  Eyes:      Pupils: Pupils are equal, round, and reactive to light  Cardiovascular:      Rate and Rhythm: Normal rate and regular rhythm  Heart sounds: Normal heart sounds  Pulmonary:      Effort: Pulmonary effort is normal       Breath sounds: Normal breath sounds  Abdominal:      General: Bowel sounds are normal       Palpations: Abdomen is soft  Musculoskeletal:         General: Normal range of motion  Cervical back: Normal range of motion and neck supple  Skin:     General: Skin is warm  Findings: No erythema or rash  Neurological:      Mental Status: She is alert and oriented to person, place, and time  Psychiatric:         Behavior: Behavior normal          LABS/IMAGING  Hemoglobin A1C   Date Value Ref Range Status   07/13/2022 10 1 (A) 6 5 Final   06/09/2021 5 8 (H) Normal 3 8-5 6%; PreDiabetic 5 7-6 4%;  Diabetic >=6 5%; Glycemic control for adults with diabetes <7 0% % Final   10/05/2018 6 0 (H) <5 7 % Final     Comment:     Reference Range  Non-diabetic                     <5 7  Pre-diabetic                     5 7-6 4  Diabetic                         >=6 5  ADA target for diabetic control  <=7     HDL, Direct   Date Value Ref Range Status   06/24/2022 42 (L) >=50 mg/dL Final     Comment:     Specimen collection should occur prior to Metamizole administration due to the potential for falsley depressed results  Triglycerides   Date Value Ref Range Status   06/24/2022 98 See Comment mg/dL Final     Comment:     Triglyceride:     0-9Y            <75mg/dL     10Y-17Y         <90 mg/dL       >=18Y     Normal          <150 mg/dL     Borderline High 150-199 mg/dL     High            200-499 mg/dL        Very High       >499 mg/dL    Specimen collection should occur prior to N-Acetylcysteine or Metamizole administration due to the potential for falsely depressed results        WBC   Date Value Ref Range Status   08/04/2022 7 49 4 31 - 10 16 Thousand/uL Final   07/16/2022 10 70 (H) 4 31 - 10 16 Thousand/uL Final   06/24/2022 9 08 4 31 - 10 16 Thousand/uL Final     Hemoglobin   Date Value Ref Range Status   08/04/2022 10 8 (L) 11 5 - 15 4 g/dL Final   07/16/2022 12 0 11 5 - 15 4 g/dL Final   06/24/2022 11 0 (L) 11 5 - 15 4 g/dL Final     Platelets   Date Value Ref Range Status   08/04/2022 218 149 - 390 Thousands/uL Final   07/16/2022 325 149 - 390 Thousands/uL Final   06/24/2022 318 149 - 390 Thousands/uL Final     Potassium   Date Value Ref Range Status   08/04/2022 3 7 3 5 - 5 3 mmol/L Final   07/16/2022 3 8 3 5 - 5 3 mmol/L Final   05/17/2022 3 5 3 5 - 5 3 mmol/L Final     Chloride   Date Value Ref Range Status   08/04/2022 107 96 - 108 mmol/L Final   07/16/2022 101 100 - 108 mmol/L Final   05/17/2022 108 100 - 108 mmol/L Final     CO2   Date Value Ref Range Status   08/04/2022 23 21 - 32 mmol/L Final   07/16/2022 22 21 - 32 mmol/L Final   05/17/2022 24 21 - 32 mmol/L Final     BUN   Date Value Ref Range Status   08/04/2022 10 5 - 25 mg/dL Final   07/16/2022 20 5 - 25 mg/dL Final   05/17/2022 13 5 - 25 mg/dL Final     Creatinine   Date Value Ref Range Status   08/04/2022 1 02 0 60 - 1 30 mg/dL Final     Comment:     Standardized to IDMS reference method   07/16/2022 1 16 0 60 - 1 30 mg/dL Final     Comment:     Standardized to IDMS reference method   05/17/2022 0 98 0 60 - 1 30 mg/dL Final     Comment:     Standardized to IDMS reference method     eGFR   Date Value Ref Range Status   08/04/2022 70 ml/min/1 73sq m Final   07/16/2022 60 ml/min/1 73sq m Final   05/17/2022 73 ml/min/1 73sq m Final     Calcium   Date Value Ref Range Status   08/04/2022 9 0 8 3 - 10 1 mg/dL Final   07/16/2022 9 3 8 3 - 10 1 mg/dL Final   05/17/2022 9 1 8 3 - 10 1 mg/dL Final     AST   Date Value Ref Range Status   08/04/2022 5 5 - 45 U/L Final     Comment:     Specimen collection should occur prior to Sulfasalazine administration due to the potential for falsely depressed results  07/16/2022 9 5 - 45 U/L Final     Comment:     Specimen collection should occur prior to Sulfasalazine administration due to the potential for falsely depressed results  05/17/2022 10 5 - 45 U/L Final     Comment:     Specimen collection should occur prior to Sulfasalazine administration due to the potential for falsely depressed results  ALT   Date Value Ref Range Status   08/04/2022 13 12 - 78 U/L Final     Comment:     Specimen collection should occur prior to Sulfasalazine and/or Sulfapyridine administration due to the potential for falsely depressed results  07/16/2022 19 12 - 78 U/L Final     Comment:     Specimen collection should occur prior to Sulfasalazine and/or Sulfapyridine administration due to the potential for falsely depressed results  05/17/2022 17 12 - 78 U/L Final     Comment:     Specimen collection should occur prior to Sulfasalazine and/or Sulfapyridine administration due to the potential for falsely depressed results  Alkaline Phosphatase   Date Value Ref Range Status   08/04/2022 65 46 - 116 U/L Final   07/16/2022 90 46 - 116 U/L Final   05/17/2022 84 46 - 116 U/L Final     No results found for: TSH    This note has been dictated using Virtual Command software   It may contain errors, including improperly dictated words  Please contact physician directly for any questions       Janice White MD   PGY-3

## 2022-09-11 NOTE — ASSESSMENT & PLAN NOTE
· BP today 130/80    At goal per JNC 8  · Previous and amlodipine however blood pressure seems to be controlled without medication currently  · Amlodipine off medication list  · Continue to monitor BP's during office visits

## 2022-09-11 NOTE — ASSESSMENT & PLAN NOTE
· Last Pap 5/26/2022: ASCUS with negative HPV   Recommend repeat HPV + Cytology in 3 years  · Flu vaccine administered today

## 2022-09-11 NOTE — ASSESSMENT & PLAN NOTE
· Urea breath test positive on 6/24/2022  s  · Triple therapy started on 6/28/2022   (Amoxicillin 1000 mg BID for 14 days, Clarithromycin 500 mg BID for 14 days and Pantoprazole 40 mg BID for 14 days -completed  · Will get repeat urea breath test-called and notified patient who will get this tomorrow

## 2022-09-11 NOTE — ASSESSMENT & PLAN NOTE
· Most recent CBC with Hgb at 10 8 (previous 12)  · Not currently taking Iron supplementation  · Encouraged diet with Iron-foods discussed  · Will recheck CBC in 1 month

## 2022-09-11 NOTE — ASSESSMENT & PLAN NOTE
Lab Results   Component Value Date    HGBA1C 10 1 (A) 07/13/2022   · Last A1c prior was 6 8  · Fasting sugars have improved from 250's to 175-190  · Actively trying to lose weight then change dietary habits  Has lost close to 20 pounds  · Diabetic foot exam performed 7/13  · Ophthalmologic evaluation 9/13 with  Ophthalmology  · Endocrinology referral placed in ED, scheduled for October  · Continue Metformin 1,000 mg BID  Not tolerating as well as the 500  Will try and split doses  If not she will go back down to 500 mg BID   · Will check A1C in 1 month   Would like to add at this time a GLP-1

## 2022-10-03 LAB
LEFT EYE DIABETIC RETINOPATHY: NORMAL
RIGHT EYE DIABETIC RETINOPATHY: NORMAL

## 2022-10-10 ENCOUNTER — APPOINTMENT (OUTPATIENT)
Dept: LAB | Facility: CLINIC | Age: 37
End: 2022-10-10
Payer: COMMERCIAL

## 2022-10-10 ENCOUNTER — OFFICE VISIT (OUTPATIENT)
Dept: FAMILY MEDICINE CLINIC | Facility: CLINIC | Age: 37
End: 2022-10-10

## 2022-10-10 VITALS
OXYGEN SATURATION: 97 % | RESPIRATION RATE: 18 BRPM | HEART RATE: 121 BPM | HEIGHT: 67 IN | BODY MASS INDEX: 45.78 KG/M2 | WEIGHT: 291.7 LBS | SYSTOLIC BLOOD PRESSURE: 144 MMHG | DIASTOLIC BLOOD PRESSURE: 104 MMHG | TEMPERATURE: 98.2 F

## 2022-10-10 DIAGNOSIS — G63 PERIPHERAL NEUROPATHY DUE TO METABOLIC DISORDER (HCC): ICD-10-CM

## 2022-10-10 DIAGNOSIS — A04.8 H. PYLORI INFECTION: ICD-10-CM

## 2022-10-10 DIAGNOSIS — E11.9 TYPE 2 DIABETES MELLITUS WITHOUT COMPLICATION, WITHOUT LONG-TERM CURRENT USE OF INSULIN (HCC): ICD-10-CM

## 2022-10-10 DIAGNOSIS — H04.123 DRY EYES: ICD-10-CM

## 2022-10-10 DIAGNOSIS — E88.9 PERIPHERAL NEUROPATHY DUE TO METABOLIC DISORDER (HCC): ICD-10-CM

## 2022-10-10 DIAGNOSIS — I10 BENIGN ESSENTIAL HTN: ICD-10-CM

## 2022-10-10 DIAGNOSIS — Z00.00 ANNUAL PHYSICAL EXAM: ICD-10-CM

## 2022-10-10 DIAGNOSIS — E11.9 TYPE 2 DIABETES MELLITUS WITHOUT COMPLICATION, WITHOUT LONG-TERM CURRENT USE OF INSULIN (HCC): Primary | ICD-10-CM

## 2022-10-10 DIAGNOSIS — E78.2 MIXED HYPERLIPIDEMIA: ICD-10-CM

## 2022-10-10 LAB
ANION GAP SERPL CALCULATED.3IONS-SCNC: 7 MMOL/L (ref 4–13)
BUN SERPL-MCNC: 13 MG/DL (ref 5–25)
CALCIUM SERPL-MCNC: 9.4 MG/DL (ref 8.3–10.1)
CHLORIDE SERPL-SCNC: 109 MMOL/L (ref 96–108)
CO2 SERPL-SCNC: 23 MMOL/L (ref 21–32)
CREAT SERPL-MCNC: 0.95 MG/DL (ref 0.6–1.3)
GFR SERPL CREATININE-BSD FRML MDRD: 76 ML/MIN/1.73SQ M
GLUCOSE P FAST SERPL-MCNC: 135 MG/DL (ref 65–99)
MAGNESIUM SERPL-MCNC: 1.9 MG/DL (ref 1.6–2.6)
POTASSIUM SERPL-SCNC: 3.7 MMOL/L (ref 3.5–5.3)
SL AMB POCT HEMOGLOBIN AIC: 8.1 (ref ?–6.5)
SODIUM SERPL-SCNC: 139 MMOL/L (ref 135–147)

## 2022-10-10 PROCEDURE — 83735 ASSAY OF MAGNESIUM: CPT

## 2022-10-10 PROCEDURE — 83036 HEMOGLOBIN GLYCOSYLATED A1C: CPT

## 2022-10-10 PROCEDURE — 80048 BASIC METABOLIC PNL TOTAL CA: CPT

## 2022-10-10 PROCEDURE — 36415 COLL VENOUS BLD VENIPUNCTURE: CPT

## 2022-10-10 PROCEDURE — 83036 HEMOGLOBIN GLYCOSYLATED A1C: CPT | Performed by: FAMILY MEDICINE

## 2022-10-10 PROCEDURE — 99395 PREV VISIT EST AGE 18-39: CPT | Performed by: FAMILY MEDICINE

## 2022-10-10 RX ORDER — SEMAGLUTIDE 1.34 MG/ML
0.5 INJECTION, SOLUTION SUBCUTANEOUS WEEKLY
Qty: 1.5 ML | Refills: 3 | Status: CANCELLED | OUTPATIENT
Start: 2022-10-10

## 2022-10-10 RX ORDER — ATORVASTATIN CALCIUM 10 MG/1
10 TABLET, FILM COATED ORAL DAILY
Qty: 30 TABLET | Refills: 3 | Status: SHIPPED | OUTPATIENT
Start: 2022-10-10

## 2022-10-10 RX ORDER — CARBOXYMETHYLCELLULOSE SODIUM 5 MG/ML
2 SOLUTION/ DROPS OPHTHALMIC 3 TIMES DAILY PRN
Qty: 1 EACH | Refills: 2 | Status: SHIPPED | OUTPATIENT
Start: 2022-10-10

## 2022-10-10 NOTE — ASSESSMENT & PLAN NOTE
Lab Results   Component Value Date    HGBA1C 8 1 (A) 10/10/2022   · Congratulated patient on improved A1C (Last A1c prior was 10 1)  · Diabetic foot exam performed 7/13  · UTD on Pneumonia vaccine   · Ophthalmologic evaluation 9/13 with  Ophthalmology diabetic retinopathy  · Continue Metformin 500 mg BID  Previously on 1,000 mg BID but decreased dose secondary to GI intolerance   · Discussed adding GLP-1 weekly injection   Patient prefers to continue current plan

## 2022-10-10 NOTE — PROGRESS NOTES
[address] [place] [address]  Annual Well Adult Visit      Assessment/Plan   H  pylori infection  · Urea breath test positive on 6/24/2022  · Triple therapy completed (Amoxicillin 1000 mg BID for 14 days, Clarithromycin 500 mg BID for 14 days and Pantoprazole 40 mg BID for 14 days  · Repeat urea breath test to ensure resolution    Benign essential HTN  · BP today 144/104; not at goal  Stated she was rushing in to get here  Previous blood pressures have been controlled  · Previous and amlodipine however blood pressure seems to be controlled without medication currently  · Continue to monitor BP's during office visits  Can always restart Amlodipine     Type 2 diabetes mellitus without complication, without long-term current use of insulin ([facility] )    Lab Results   Component Value Date    HGBA1C 8 1 (A) 10/10/2022   · Congratulated patient on improved A1C (Last A1c prior was 10 1)  · Diabetic foot exam performed 7/13  · UTD on Pneumonia vaccine   · Ophthalmologic evaluation 9/13 with  Ophthalmology diabetic retinopathy  · Continue Metformin 500 mg BID  Previously on 1,000 mg BID but decreased dose secondary to GI intolerance   · Discussed adding GLP-1 weekly injection  Patient prefers to continue current plan    Mixed hyperlipidemia  · Lipid panel 06/24/2022- Cholesterol-222, triglycerides-98, HDL-42, LDL-160  · Continue atorvastatin 10 mg     1  Healthy female exam   2  Patient Counseling:   · Nutrition: Stressed importance of a well balanced diet, moderation of sodium/saturated fat, caloric balance and sufficient intake of fiber  · Exercise: Stressed the importance of regular exercise with a goal of 150 minutes per week  · Dental Health: Discussed daily flossing and brushing and regular dental visits   · Sexuality: Discussed sexually transmitted infections, use of condoms and prevention of unintended pregnancy  · Immunizations reviewed   COVID, Flu, Tdap  · Discussed benefits of screening Cervical Cancer Screening  · Discussed the patient's BMI with her  The BMI is above average; BMI management plan is completed  3  Follow up in 3 months  Subjective     Sharath Garcia is a 44 y o   female and is here for routine health maintenance  The patient reports problems - dry right nostril  History of Present Illness       Well Adult Physical   Patient here for a comprehensive physical exam       Diet and Physical Activity  Diet: well balanced diet  Weight concerns: Patient has class 3 obesity (BMI >40)  Exercise: goes for walks; discussed goal of 5,000 steps 3-5 times per week      Depression Screen  PHQ-2/9 Depression Screening    Little interest or pleasure in doing things: 0 - not at all  Feeling down, depressed, or hopeless: 0 - not at all  Trouble falling or staying asleep, or sleeping too much: 0 - not at all  Feeling tired or having little energy: 0 - not at all  Poor appetite or overeatin - not at all  Feeling bad about yourself - or that you are a failure or have let yourself or your family down: 0 - not at all  Trouble concentrating on things, such as reading the newspaper or watching television: 0 - not at all  Moving or speaking so slowly that other people could have noticed  Or the opposite - being so fidgety or restless that you have been moving around a lot more than usual: 0 - not at all  Thoughts that you would be better off dead, or of hurting yourself in some way: 0 - not at all  PHQ-9 Score: 0   PHQ-9 Interpretation: No or Minimal depression        General Health  Hearing: Normal:  bilateral  Vision: no vision problems  Dental: regular dental visits     History:  LMP: No LMP recorded (lmp unknown)       Cancer Screening  Colononoscopy N/A  Mammogram N/A   Pap Up to date   Abnormal pap? no  Smoker yes Annual screening with low-dose helical computed tomography (CT) for patients age 54 to 76 years with history of smoking at least 30 pack-years and, if a former smoker, had quit within the previous 15 years      The following portions of the patient's history were reviewed and updated as appropriate: allergies, current medications, past family history, past medical history, past social history, past surgical history and problem list     Review of Systems     Review of Systems   Constitutional: Negative for chills, fatigue and fever  HENT: Negative for sore throat  Respiratory: Negative for cough, chest tightness and shortness of breath  Cardiovascular: Negative for chest pain and leg swelling  Gastrointestinal: Negative for constipation, diarrhea, nausea and vomiting  Endocrine: Negative for polydipsia, polyphagia and polyuria  Genitourinary: Negative for dysuria  Musculoskeletal: Negative for arthralgias  Skin: Negative for rash  Neurological: Negative for dizziness, light-headedness and headaches  Psychiatric/Behavioral: Negative for agitation and behavioral problems         Past Medical History     Past Medical History:   Diagnosis Date   • Dental abscess 10/23/2020   • Hypokalemia 6/8/2020   • Pseudotumor cerebri        Past Surgical History     Past Surgical History:   Procedure Laterality Date   • TONSILLECTOMY         Social History     Social History     Socioeconomic History   • Marital status: Single     Spouse name: None   • Number of children: None   • Years of education: None   • Highest education level: None   Occupational History   • None   Tobacco Use   • Smoking status: Current Every Day Smoker     Packs/day: 0 25     Types: Cigarettes   • Smokeless tobacco: Never Used   • Tobacco comment: pt quit 27 days ago   Vaping Use   • Vaping Use: Never used   Substance and Sexual Activity   • Alcohol use: No   • Drug use: No   • Sexual activity: None   Other Topics Concern   • None   Social History Narrative   • None     Social Determinants of Health     Financial Resource Strain: Low Risk    • Difficulty of Paying Living Expenses: Not hard at all   Food Insecurity: No Food Insecurity   • Worried About Running Out of Food in the Last Year: Never true   • Ran Out of Food in the Last Year: Never true   Transportation Needs: No Transportation Needs   • Lack of Transportation (Medical): No   • Lack of Transportation (Non-Medical): No   Physical Activity: Not on file   Stress: Not on file   Social Connections: Not on file   Intimate Partner Violence: Not on file   Housing Stability: Not on file       Family History     No family history on file      Current Medications       Current Outpatient Medications:   •  atorvastatin (LIPITOR) 10 mg tablet, Take 1 tablet (10 mg total) by mouth daily, Disp: 30 tablet, Rfl: 3  •  carboxymethylcellulose 0 5 % SOLN, Administer 2 drops to both eyes 3 (three) times a day as needed for dry eyes, Disp: 1 each, Rfl: 2  •  albuterol (PROVENTIL HFA,VENTOLIN HFA) 90 mcg/act inhaler, Inhale 2 puffs every 6 (six) hours as needed for wheezing or shortness of breath, Disp: 6 7 g, Rfl: 0  •  Blood Glucose Monitoring Suppl (OneTouch Verio) w/Device KIT, Use 2 (two) times a day Testing sugars 2xs daily, Disp: 1 kit, Rfl: 0  •  Blood Pressure KIT, by Does not apply route daily (Patient not taking: No sig reported), Disp: 1 each, Rfl: 0  •  cetirizine (ZyrTEC) 10 mg tablet, Take 1 tablet (10 mg total) by mouth daily, Disp: 30 tablet, Rfl: 3  •  ferrous sulfate 324 (65 Fe) mg, Take 1 tablet (324 mg total) by mouth every other day, Disp: 60 tablet, Rfl: 1  •  glucose blood (OneTouch Verio) test strip, Testing sugars 2xs daily, Disp: 100 each, Rfl: 3  •  metFORMIN (GLUCOPHAGE) 1000 MG tablet, Take 1 tablet (1,000 mg total) by mouth 2 (two) times a day with meals, Disp: 90 tablet, Rfl: 0  •  OneTouch Delica Lancets 29F MISC, Use 2 (two) times a day Testing sugars 2xs daily, Disp: 100 each, Rfl: 3     Allergies     Allergies   Allergen Reactions   • Bee Venom Anaphylaxis     Anaphylaxis   • Nabumetone Anaphylaxis     Anaphylaxis   • Orange Syrup Anaphylaxis   • Prochlorperazine Anaphylaxis     Anaphylaxis   • Chlorpheniramine    • Codeine    • Diphenhydramine      Other reaction(s): head and neck swelling   • Pseudoephedrine    • Tetanus Toxoid        Objective     BP (!) 144/104 (BP Location: Left arm, Patient Position: Sitting, Cuff Size: Standard)   Pulse (!) 121   Temp 98 2 °F (36 8 °C) (Temporal)   Resp 18   Ht 5' 7" (1 702 m)   Wt 132 kg (291 lb 11 2 oz)   LMP  (LMP Unknown)   SpO2 97%   Breastfeeding No   BMI 45 69 kg/m²      Physical Exam  Constitutional:       Appearance: She is well-developed  HENT:      Head: Normocephalic and atraumatic  Eyes:      Pupils: Pupils are equal, round, and reactive to light  Cardiovascular:      Rate and Rhythm: Normal rate and regular rhythm  Heart sounds: Normal heart sounds  Pulmonary:      Effort: Pulmonary effort is normal       Breath sounds: Normal breath sounds  Abdominal:      General: Bowel sounds are normal       Palpations: Abdomen is soft  Musculoskeletal:         General: Normal range of motion  Cervical back: Normal range of motion and neck supple  Skin:     General: Skin is warm  Findings: No erythema or rash  Neurological:      Mental Status: She is alert and oriented to person, place, and time     Psychiatric:         Behavior: Behavior normal            No exam data present    Health Maintenance     Health Maintenance   Topic Date Due   • BMI: Followup Plan  11/11/2022   • Diabetic Foot Exam  11/10/2022 (Originally 8/9/1994)   • COVID-19 Vaccine (3 - Booster for Pfizer series) 01/10/2023 (Originally 1/16/2022)   • Pneumococcal Vaccine: Pediatrics (0 to 5 Years) and At-Risk Patients (6 to 59 Years) (2 - PCV) 08/11/2023 (Originally 10/2/2015)   • Cervical Cancer Screening  08/11/2023 (Originally 8/9/2005)   • DTaP,Tdap,and Td Vaccines (6 - Tdap) 09/09/2023 (Originally 8/9/1995)   • URINE MICROALBUMIN  04/07/2023   • HEMOGLOBIN A1C  04/10/2023   • BMI: Adult  10/10/2023   • Annual Physical  10/10/2023   • DM Eye Exam  10/03/2024   • HIV Screening  Completed   • Hepatitis C Screening  Completed   • HIB Vaccine  Completed   • IPV Vaccine  Completed   • Influenza Vaccine  Completed   • Hepatitis B Vaccine  Aged Out   • Hepatitis A Vaccine  Aged Out   • Meningococcal ACWY Vaccine  Aged Out   • HPV Vaccine  Aged Out     Immunization History   Administered Date(s) Administered   • COVID-19 PFIZER VACCINE 0 3 ML IM 07/26/2021, 08/16/2021   • DTP 1984, 1984, 02/28/1985, 04/10/1986, 08/24/1988   • Hep B, adult 10/10/2014, 11/13/2014, 04/15/2015   • Hib (PRP-T) 08/27/1986   • INFLUENZA 10/02/2014, 10/18/2018, 10/18/2018, 11/04/2019   • Influenza Split 11/30/2012, 11/30/2012   • Influenza, recombinant, quadrivalent,injectable, preservative free 11/04/2019, 09/09/2022   • MMR 01/29/1986, 06/05/1991   • OPV 1984, 1984, 04/10/1986, 08/24/1988   • Pneumococcal Polysaccharide PPV23 10/02/2014   • Tuberculin Skin Test-PPD Intradermal 04/22/2019, 04/22/2019         Lamar Nam    BMI Counseling: Body mass index is 45 69 kg/m²  The BMI is above normal  Nutrition recommendations include reducing portion sizes, decreasing overall calorie intake and 3-5 servings of fruits/vegetables daily  Exercise recommendations include moderate aerobic physical activity for 150 minutes/week

## 2022-10-10 NOTE — ASSESSMENT & PLAN NOTE
· BP today 144/104; not at goal  Stated she was rushing in to get here  Previous blood pressures have been controlled  · Previous and amlodipine however blood pressure seems to be controlled without medication currently  · Continue to monitor BP's during office visits   Can always restart Amlodipine

## 2022-10-10 NOTE — PATIENT INSTRUCTIONS

## 2022-10-10 NOTE — ASSESSMENT & PLAN NOTE
· Urea breath test positive on 6/24/2022  · Triple therapy completed (Amoxicillin 1000 mg BID for 14 days, Clarithromycin 500 mg BID for 14 days and Pantoprazole 40 mg BID for 14 days    · Repeat urea breath test to ensure resolution

## 2022-10-11 PROBLEM — Z00.00 HEALTHCARE MAINTENANCE: Status: RESOLVED | Noted: 2022-08-11 | Resolved: 2022-10-11

## 2022-10-11 LAB
EST. AVERAGE GLUCOSE BLD GHB EST-MCNC: 194 MG/DL
HBA1C MFR BLD: 8.4 %

## 2022-12-06 ENCOUNTER — HOSPITAL ENCOUNTER (EMERGENCY)
Facility: HOSPITAL | Age: 37
Discharge: HOME | End: 2022-12-06
Attending: EMERGENCY MEDICINE

## 2022-12-06 VITALS
TEMPERATURE: 98.1 F | HEART RATE: 114 BPM | RESPIRATION RATE: 18 BRPM | SYSTOLIC BLOOD PRESSURE: 162 MMHG | OXYGEN SATURATION: 100 % | DIASTOLIC BLOOD PRESSURE: 79 MMHG

## 2022-12-06 DIAGNOSIS — N93.9 VAGINAL BLEEDING: Primary | ICD-10-CM

## 2022-12-06 DIAGNOSIS — Z3A.10 10 WEEKS GESTATION OF PREGNANCY: ICD-10-CM

## 2022-12-06 LAB
BACTERIA UR QL AUTO: NORMAL /HPF
BILIRUB UR QL STRIP: NEGATIVE
CLARITY UR: CLEAR
COLOR UR: YELLOW
GLUCOSE UR STRIP-MCNC: ABNORMAL MG/DL
HGB UR QL STRIP.AUTO: NEGATIVE
KETONES UR STRIP-MCNC: ABNORMAL MG/DL
LEUKOCYTE ESTERASE UR QL STRIP: NEGATIVE
NITRITE UR QL STRIP: NEGATIVE
NON-SQ EPI CELLS URNS QL MICRO: NORMAL /HPF
PH UR STRIP.AUTO: 6 [PH]
PROT UR STRIP-MCNC: ABNORMAL MG/DL
RBC #/AREA URNS AUTO: NORMAL /HPF
SP GR UR STRIP.AUTO: 1.02 (ref 1–1.03)
UROBILINOGEN UR STRIP-ACNC: <2 MG/DL
WBC #/AREA URNS AUTO: NORMAL /HPF

## 2022-12-06 RX ORDER — ACETAMINOPHEN 325 MG/1
975 TABLET ORAL ONCE
Status: COMPLETED | OUTPATIENT
Start: 2022-12-06 | End: 2022-12-06

## 2022-12-06 RX ORDER — ONDANSETRON 4 MG/1
4 TABLET, ORALLY DISINTEGRATING ORAL ONCE
Status: COMPLETED | OUTPATIENT
Start: 2022-12-06 | End: 2022-12-06

## 2022-12-06 RX ADMIN — ACETAMINOPHEN 975 MG: 325 TABLET ORAL at 10:49

## 2022-12-06 RX ADMIN — ONDANSETRON 4 MG: 4 TABLET, ORALLY DISINTEGRATING ORAL at 10:49

## 2022-12-06 NOTE — ED PROVIDER NOTES
History  Chief Complaint   Patient presents with   • Abdominal Pain     Pt c/o consistent abdominal cramping since yesterday, pt had slight vaginal bleeding after wiping once last night and once this morning, pt told to be seen OB      HPI     25-year-old female  currently 10 weeks pregnant who presents for evaluation of lower abdominal cramping and vaginal bleeding  Patient states she has been having intermittent lower abdominal cramping with the past week  She states yesterday, she noticed small amount of vaginal bleeding after wiping  She states this morning, she noticed again a small amount of pink tinged vaginal bleeding  Denies any clots or passage of tissue  Patient has also been having nausea and vomiting during this pregnancy  She has been able to keep down small amounts of water  She has been taking B6 and Zofran  Denies fevers or chills  Denies chest pain, shortness of breath, or cough  Denies urinary symptoms  Patient follows with [facility]  She states she did have a dating ultrasound done 2 weeks ago  This showed a normal IUP  Patient states she has had prior elective  and prior stillborn  She has 4 living children  Denies complications with this pregnancy  Prior to Admission Medications   Prescriptions Last Dose Informant Patient Reported? Taking?    Blood Glucose Monitoring Suppl (OneTouch Verio) w/Device KIT   No No   Sig: Use 2 (two) times a day Testing sugars 2xs daily   Blood Pressure KIT   No No   Sig: by Does not apply route daily   Patient not taking: No sig reported   OneTouch Delica Lancets 29W MISC   No No   Sig: Use 2 (two) times a day Testing sugars 2xs daily   albuterol (PROVENTIL HFA,VENTOLIN HFA) 90 mcg/act inhaler   No No   Sig: Inhale 2 puffs every 6 (six) hours as needed for wheezing or shortness of breath   atorvastatin (LIPITOR) 10 mg tablet   No No   Sig: Take 1 tablet (10 mg total) by mouth daily   carboxymethylcellulose 0 5 % SOLN   No No Sig: Administer 2 drops to both eyes 3 (three) times a day as needed for dry eyes   cetirizine (ZyrTEC) 10 mg tablet   No No   Sig: Take 1 tablet (10 mg total) by mouth daily   ferrous sulfate 324 (65 Fe) mg   No No   Sig: Take 1 tablet (324 mg total) by mouth every other day   glucose blood (OneTouch Verio) test strip   No No   Sig: Testing sugars 2xs daily   metFORMIN (GLUCOPHAGE) 1000 MG tablet   No No   Sig: Take 1 tablet (1,000 mg total) by mouth 2 (two) times a day with meals      Facility-Administered Medications: None       Past Medical History:   Diagnosis Date   • Dental abscess 10/23/2020   • Hypokalemia 6/8/2020   • Pseudotumor cerebri        Past Surgical History:   Procedure Laterality Date   • TONSILLECTOMY         History reviewed  No pertinent family history  I have reviewed and agree with the history as documented  E-Cigarette/Vaping   • E-Cigarette Use Never User      E-Cigarette/Vaping Substances   • Nicotine No    • THC No    • CBD No    • Flavoring No    • Other No    • Unknown No      Social History     Tobacco Use   • Smoking status: Every Day     Packs/day: 0 25     Types: Cigarettes   • Smokeless tobacco: Never   • Tobacco comments:     pt quit 27 days ago   Vaping Use   • Vaping Use: Never used   Substance Use Topics   • Alcohol use: No   • Drug use: No        Review of Systems   Constitutional: Negative for appetite change, chills and fever  HENT: Negative for congestion, rhinorrhea and sore throat  Respiratory: Negative for cough and shortness of breath  Cardiovascular: Negative for chest pain  Gastrointestinal: Positive for abdominal pain, nausea and vomiting  Negative for diarrhea  Genitourinary: Positive for vaginal bleeding  Negative for dysuria, frequency, hematuria, urgency and vaginal discharge  Musculoskeletal: Negative for arthralgias and myalgias  Skin: Negative for rash     Neurological: Negative for dizziness, weakness, light-headedness, numbness and headaches  All other systems reviewed and are negative  Physical Exam  ED Triage Vitals   Temperature Pulse Respirations Blood Pressure SpO2   12/06/22 1024 12/06/22 1024 12/06/22 1024 12/06/22 1024 12/06/22 1024   98 1 °F (36 7 °C) (!) 114 18 162/79 100 %      Temp Source Heart Rate Source Patient Position - Orthostatic VS BP Location FiO2 (%)   12/06/22 1024 12/06/22 1024 12/06/22 1024 12/06/22 1024 --   Oral Monitor Lying Right arm       Pain Score       12/06/22 1049       3             Orthostatic Vital Signs  Vitals:    12/06/22 1024   BP: 162/79   Pulse: (!) 114   Patient Position - Orthostatic VS: Lying       Physical Exam  Vitals and nursing note reviewed  Constitutional:       General: She is not in acute distress  Appearance: Normal appearance  She is well-developed  She is obese  She is not ill-appearing, toxic-appearing or diaphoretic  HENT:      Head: Normocephalic and atraumatic  Right Ear: External ear normal       Left Ear: External ear normal       Nose: Nose normal       Mouth/Throat:      Mouth: Mucous membranes are moist       Pharynx: Oropharynx is clear  Eyes:      Extraocular Movements: Extraocular movements intact  Conjunctiva/sclera: Conjunctivae normal    Cardiovascular:      Rate and Rhythm: Normal rate and regular rhythm  Pulses: Normal pulses  Heart sounds: Normal heart sounds  No murmur heard  No friction rub  No gallop  Pulmonary:      Effort: Pulmonary effort is normal  No respiratory distress  Breath sounds: Normal breath sounds  No wheezing or rales  Abdominal:      General: There is no distension  Palpations: Abdomen is soft  Tenderness: There is abdominal tenderness in the suprapubic area  There is no guarding or rebound  Comments: Mild suprapubic tenderness  Musculoskeletal:         General: No tenderness  Cervical back: Neck supple  Skin:     General: Skin is warm and dry        Coloration: Skin is not pale       Findings: No erythema or rash  Neurological:      General: No focal deficit present  Mental Status: She is alert and oriented to person, place, and time  Cranial Nerves: No cranial nerve deficit  Sensory: No sensory deficit  Motor: No weakness     Psychiatric:         Mood and Affect: Mood normal          Behavior: Behavior normal          ED Medications  Medications   ondansetron (ZOFRAN-ODT) dispersible tablet 4 mg (4 mg Oral Given 12/6/22 1049)   acetaminophen (TYLENOL) tablet 975 mg (975 mg Oral Given 12/6/22 1049)       Diagnostic Studies  Results Reviewed     Procedure Component Value Units Date/Time    Urine Microscopic [[id number]]  (Normal) Collected: 12/06/22 1054    Lab Status: Final result Specimen: Urine, Clean Catch Updated: 12/06/22 1107     RBC, UA 1-2 /hpf      WBC, UA 1-2 /hpf      Epithelial Cells Occasional /hpf      Bacteria, UA None Seen /hpf     UA w Reflex to Microscopic w Reflex to Culture [[id number]]  (Abnormal) Collected: 12/06/22 1054    Lab Status: Final result Specimen: Urine, Clean Catch Updated: 12/06/22 1104     Color, UA Yellow     Clarity, UA Clear     Specific Gravity, UA 1 024     pH, UA 6 0     Leukocytes, UA Negative     Nitrite, UA Negative     Protein, UA Trace mg/dl      Glucose,  (1/2%) mg/dl      Ketones, UA Trace mg/dl      Urobilinogen, UA <2 0 mg/dl      Bilirubin, UA Negative     Occult Blood, UA Negative                 No orders to display         Procedures  POC Pelvic US    Date/Time: 12/6/2022 11:01 AM  Performed by: Lashay Clifford MD  Authorized by: Lashay Clifford MD     Patient location:  ED  Other Assisting Provider: Yes (comment) (Dr Ingrid Leon)    Procedure details:     Exam Type:  Diagnostic    Indications: pregnant with abdominal pain      Assessment for: determine estimated gestational age      Technique:  Transabdominal obstetric (HCG+) exam    Views obtained: uterus (transverse and sagittal)      Image quality: diagnostic      Image availability:  Images available in PACS, video obtained and still images obtained  Uterine findings:     Endometrial stripe: identified      Intrauterine pregnancy: identified      Single gestation: identified      Gestational sac: identified      Yolk sac: identified      Fetal pole: identified      Fetal heart rate: identified      Fetal heart rate (bpm):  174  Other findings:     Free pelvic fluid: not identified      Free peritoneal fluid: not identified    Interpretation:     Ultrasound impressions: normal      Pregnancy findings: intrauterine pregnancy (IUP)            ED Course                             SBIRT 20yo+    Flowsheet Row Most Recent Value   SBIRT (25 yo +)    In order to provide better care to our patients, we are screening all of our patients for alcohol and drug use  Would it be okay to ask you these screening questions? Unable to answer at this time Filed at: 2022 1057                MDM     42-year-old female  currently 10 weeks pregnant who presents for evaluation of lower abdominal cramping and vaginal bleeding  Differential diagnosis includes: implantation bleeding, threatened miscarriage, uti  Will obtain UA  Will assess for fetal heart rate with point-of-care ultrasound  Will give Tylenol and Zofran for symptoms  Fetal heart rate 174  UA negative for infection  Discussed with patient, will have patient follow-up with OB/GYN  Discussed with patient strict return precautions including worsening or severe bleeding or pain  Patient expressed understanding and was agreeable for discharge      Disposition  Final diagnoses:   Vaginal bleeding   10 weeks gestation of pregnancy     Time reflects when diagnosis was documented in both MDM as applicable and the Disposition within this note     Time User Action Codes Description Comment    2022 11:30 AM Yomi Pontiff Add [N93 9] Vaginal bleeding     2022 11:30 AM Yomi Pontiff Add [Z3A 10] 10 weeks gestation of pregnancy       ED Disposition     ED Disposition   Discharge    Condition   Stable    Date/Time   Tue Dec 6, 2022 11:30 AM    Comment   Angus Dacosta discharge to home/self care  Follow-up Information     Follow up With Specialties Details Why Contact Info    OBGYN              Discharge Medication List as of 12/6/2022 11:31 AM      CONTINUE these medications which have NOT CHANGED    Details   albuterol (PROVENTIL HFA,VENTOLIN HFA) 90 mcg/act inhaler Inhale 2 puffs every 6 (six) hours as needed for wheezing or shortness of breath, Starting Thu 4/7/2022, Normal      atorvastatin (LIPITOR) 10 mg tablet Take 1 tablet (10 mg total) by mouth daily, Starting Mon 10/10/2022, Normal      Blood Glucose Monitoring Suppl (OneTouch Verio) w/Device KIT Use 2 (two) times a day Testing sugars 2xs daily, Starting Sat 7/16/2022, Normal      Blood Pressure KIT by Does not apply route daily, Starting Mon 1/28/2019, Print      carboxymethylcellulose 0 5 % SOLN Administer 2 drops to both eyes 3 (three) times a day as needed for dry eyes, Starting Mon 10/10/2022, Normal      cetirizine (ZyrTEC) 10 mg tablet Take 1 tablet (10 mg total) by mouth daily, Starting Wed 7/13/2022, Normal      ferrous sulfate 324 (65 Fe) mg Take 1 tablet (324 mg total) by mouth every other day, Starting Wed 4/13/2022, Normal      glucose blood (OneTouch Verio) test strip Testing sugars 2xs daily, Normal      metFORMIN (GLUCOPHAGE) 1000 MG tablet Take 1 tablet (1,000 mg total) by mouth 2 (two) times a day with meals, Starting Thu 8/11/2022, Normal      OneTouch Delica Lancets 64F MISC Use 2 (two) times a day Testing sugars 2xs daily, Starting Sat 7/16/2022, Normal           No discharge procedures on file  PDMP Review       Value Time User    PDMP Reviewed  Yes 4/30/2020  3:56 PM Willi Sandoval MD           ED Provider  Attending physically available and evaluated Angus Dacosta   EMILY managed the patient along with the ED Attending      Electronically Signed by         Ebony Ledezma MD  12/07/22 1257

## 2022-12-06 NOTE — ED ATTENDING ATTESTATION
12/6/2022  ILaura MD, saw and evaluated the patient  I have discussed the patient with the resident/non-physician practitioner and agree with the resident's/non-physician practitioner's findings, Plan of Care, and MDM as documented in the resident's/non-physician practitioner's note, except where noted  All available labs and Radiology studies were reviewed  I was present for key portions of any procedure(s) performed by the resident/non-physician practitioner and I was immediately available to provide assistance  At this point I agree with the current assessment done in the Emergency Department  I have conducted an independent evaluation of this patient a history and physical is as follows:    ED Course     28-year-old female, G7, P4, approximately 10 weeks pregnant with previously demonstrated intrauterine pregnancy, is presenting to the emergency department for evaluation of 1 day history of suprapubic abdominal cramping and light spotting  Patient has had problems with baseline nausea and vomiting during the pregnancy and is prescribed B6 and Zofran  No fever, chest pain, cough, shortness of breath, diarrhea  No urinary symptoms including dysuria, urinary frequency, or urgency  10 systems reviewed and negative except as noted  The patient is resting comfortably on a stretcher in no acute respiratory distress  The patient appears nontoxic  HEENT reveals moist mucous membranes  Head is normocephalic and atraumatic  Conjunctiva and sclera are normal  Neck is nontender and supple with full range of motion to flexion, extension, lateral rotation  No meningismus appreciated  No masses are appreciated  Lungs are clear to auscultation bilaterally without any wheezes, rales or rhonchi  Heart is regular rate and rhythm without any murmurs, rubs or gallops  Abdomen is soft and nontender without any rebound or guarding  Extremities appear grossly normal without any significant arthropathy  Patient is awake, alert, and oriented x3  The patient has normal interaction  Cranial nerves grossly intact  Motor is 5 out of 5 bilateral upper and lower extremities  Bedside ultrasound was performed and demonstrated intrauterine pregnancy with fetal motion and fetal heart rate of 176 bpm   No evidence of subchorionic hemorrhage  Likely implantation bleeding      Labs Reviewed   UA W REFLEX TO MICROSCOPIC WITH REFLEX TO CULTURE - Abnormal       Result Value Ref Range Status    Color, UA Yellow   Final    Clarity, UA Clear   Final    Specific Gravity, UA 1 024  1 003 - 1 030 Final    pH, UA 6 0  4 5, 5 0, 5 5, 6 0, 6 5, 7 0, 7 5, 8 0 Final    Leukocytes, UA Negative  Negative Final    Nitrite, UA Negative  Negative Final    Protein, UA Trace (*) Negative mg/dl Final    Glucose,  (1/2%) (*) Negative mg/dl Final    Ketones, UA Trace (*) Negative mg/dl Final    Urobilinogen, UA <2 0  <2 0 mg/dl mg/dl Final    Bilirubin, UA Negative  Negative Final    Occult Blood, UA Negative  Negative Final   URINE MICROSCOPIC - Normal    RBC, UA 1-2  None Seen, 1-2 /hpf Final    WBC, UA 1-2  None Seen, 1-2 /hpf Final    Epithelial Cells Occasional  None Seen, Occasional /hpf Final    Bacteria, UA None Seen  None Seen, Occasional /hpf Final           Critical Care Time  Procedures

## 2023-01-09 ENCOUNTER — HOSPITAL ENCOUNTER (EMERGENCY)
Facility: HOSPITAL | Age: 38
Discharge: HOME | End: 2023-01-10
Attending: EMERGENCY MEDICINE

## 2023-01-09 VITALS
WEIGHT: 290 LBS | RESPIRATION RATE: 20 BRPM | TEMPERATURE: 97.6 F | HEART RATE: 108 BPM | SYSTOLIC BLOOD PRESSURE: 153 MMHG | HEIGHT: 67 IN | OXYGEN SATURATION: 99 % | BODY MASS INDEX: 45.52 KG/M2 | DIASTOLIC BLOOD PRESSURE: 93 MMHG

## 2023-01-09 DIAGNOSIS — K59.00 CONSTIPATION: ICD-10-CM

## 2023-01-09 DIAGNOSIS — O21.9 NAUSEA AND VOMITING DURING PREGNANCY: Primary | ICD-10-CM

## 2023-01-09 DIAGNOSIS — R10.9 ABDOMINAL PAIN: ICD-10-CM

## 2023-01-09 NOTE — Clinical Note
Ebonie Wilmer was seen and treated in our emergency department on 1/9/2023  Diagnosis:     Porfirio Forrest    She may return on this date: 01/14/2023    May return sooner if symptoms improve     If you have any questions or concerns, please don't hesitate to call        Estrellita Anderson MD    ______________________________           _______________          _______________  Hospital Representative                              Date                                Time

## 2023-01-10 LAB
ALBUMIN SERPL BCP-MCNC: 3.3 G/DL (ref 3.5–5)
ALP SERPL-CCNC: 55 U/L (ref 46–116)
ALT SERPL W P-5'-P-CCNC: 11 U/L (ref 12–78)
ANION GAP SERPL CALCULATED.3IONS-SCNC: 9 MMOL/L (ref 4–13)
AST SERPL W P-5'-P-CCNC: <5 U/L (ref 5–45)
BACTERIA UR QL AUTO: ABNORMAL /HPF
BASOPHILS # BLD AUTO: 0.04 THOUSANDS/ÂΜL (ref 0–0.1)
BASOPHILS NFR BLD AUTO: 0 % (ref 0–1)
BILIRUB SERPL-MCNC: 0.26 MG/DL (ref 0.2–1)
BILIRUB UR QL STRIP: NEGATIVE
BUN SERPL-MCNC: 13 MG/DL (ref 5–25)
CALCIUM ALBUM COR SERPL-MCNC: 9.8 MG/DL (ref 8.3–10.1)
CALCIUM SERPL-MCNC: 9.2 MG/DL (ref 8.3–10.1)
CHLORIDE SERPL-SCNC: 106 MMOL/L (ref 96–108)
CLARITY UR: CLEAR
CO2 SERPL-SCNC: 21 MMOL/L (ref 21–32)
COLOR UR: YELLOW
CREAT SERPL-MCNC: 0.65 MG/DL (ref 0.6–1.3)
EOSINOPHIL # BLD AUTO: 0.1 THOUSAND/ÂΜL (ref 0–0.61)
EOSINOPHIL NFR BLD AUTO: 1 % (ref 0–6)
ERYTHROCYTE [DISTWIDTH] IN BLOOD BY AUTOMATED COUNT: 13 % (ref 11.6–15.1)
GFR SERPL CREATININE-BSD FRML MDRD: 112 ML/MIN/1.73SQ M
GLUCOSE SERPL-MCNC: 144 MG/DL (ref 65–140)
GLUCOSE UR STRIP-MCNC: ABNORMAL MG/DL
HCT VFR BLD AUTO: 32.1 % (ref 34.8–46.1)
HGB BLD-MCNC: 10.6 G/DL (ref 11.5–15.4)
HGB UR QL STRIP.AUTO: NEGATIVE
IMM GRANULOCYTES # BLD AUTO: 0.09 THOUSAND/UL (ref 0–0.2)
IMM GRANULOCYTES NFR BLD AUTO: 1 % (ref 0–2)
KETONES UR STRIP-MCNC: ABNORMAL MG/DL
LEUKOCYTE ESTERASE UR QL STRIP: NEGATIVE
LIPASE SERPL-CCNC: 142 U/L (ref 73–393)
LYMPHOCYTES # BLD AUTO: 2.79 THOUSANDS/ÂΜL (ref 0.6–4.47)
LYMPHOCYTES NFR BLD AUTO: 26 % (ref 14–44)
MCH RBC QN AUTO: 28 PG (ref 26.8–34.3)
MCHC RBC AUTO-ENTMCNC: 33 G/DL (ref 31.4–37.4)
MCV RBC AUTO: 85 FL (ref 82–98)
MONOCYTES # BLD AUTO: 0.52 THOUSAND/ÂΜL (ref 0.17–1.22)
MONOCYTES NFR BLD AUTO: 5 % (ref 4–12)
MUCOUS THREADS UR QL AUTO: ABNORMAL
NEUTROPHILS # BLD AUTO: 7.37 THOUSANDS/ÂΜL (ref 1.85–7.62)
NEUTS SEG NFR BLD AUTO: 67 % (ref 43–75)
NITRITE UR QL STRIP: NEGATIVE
NON-SQ EPI CELLS URNS QL MICRO: ABNORMAL /HPF
NRBC BLD AUTO-RTO: 0 /100 WBCS
PH UR STRIP.AUTO: 5.5 [PH]
PLATELET # BLD AUTO: 286 THOUSANDS/UL (ref 149–390)
PMV BLD AUTO: 11.5 FL (ref 8.9–12.7)
POTASSIUM SERPL-SCNC: 3.4 MMOL/L (ref 3.5–5.3)
PROT SERPL-MCNC: 7.4 G/DL (ref 6.4–8.4)
PROT UR STRIP-MCNC: ABNORMAL MG/DL
RBC # BLD AUTO: 3.79 MILLION/UL (ref 3.81–5.12)
RBC #/AREA URNS AUTO: ABNORMAL /HPF
SODIUM SERPL-SCNC: 136 MMOL/L (ref 135–147)
SP GR UR STRIP.AUTO: 1.03 (ref 1–1.03)
UROBILINOGEN UR STRIP-ACNC: <2 MG/DL
WBC # BLD AUTO: 10.91 THOUSAND/UL (ref 4.31–10.16)
WBC #/AREA URNS AUTO: ABNORMAL /HPF

## 2023-01-10 RX ORDER — BISACODYL 10 MG
10 SUPPOSITORY, RECTAL RECTAL ONCE
Status: COMPLETED | OUTPATIENT
Start: 2023-01-10 | End: 2023-01-10

## 2023-01-10 RX ORDER — SODIUM CHLORIDE, SODIUM GLUCONATE, SODIUM ACETATE, POTASSIUM CHLORIDE, MAGNESIUM CHLORIDE, SODIUM PHOSPHATE, DIBASIC, AND POTASSIUM PHOSPHATE .53; .5; .37; .037; .03; .012; .00082 G/100ML; G/100ML; G/100ML; G/100ML; G/100ML; G/100ML; G/100ML
1000 INJECTION, SOLUTION INTRAVENOUS ONCE
Status: COMPLETED | OUTPATIENT
Start: 2023-01-10 | End: 2023-01-10

## 2023-01-10 RX ORDER — METOCLOPRAMIDE HYDROCHLORIDE 5 MG/ML
10 INJECTION INTRAMUSCULAR; INTRAVENOUS ONCE
Status: COMPLETED | OUTPATIENT
Start: 2023-01-10 | End: 2023-01-10

## 2023-01-10 RX ADMIN — Medication 10 MG: at 03:23

## 2023-01-10 RX ADMIN — METOCLOPRAMIDE 10 MG: 5 INJECTION, SOLUTION INTRAMUSCULAR; INTRAVENOUS at 00:39

## 2023-01-10 RX ADMIN — SODIUM CHLORIDE, SODIUM GLUCONATE, SODIUM ACETATE, POTASSIUM CHLORIDE, MAGNESIUM CHLORIDE, SODIUM PHOSPHATE, DIBASIC, AND POTASSIUM PHOSPHATE 1000 ML: .53; .5; .37; .037; .03; .012; .00082 INJECTION, SOLUTION INTRAVENOUS at 00:39

## 2023-01-10 NOTE — DISCHARGE INSTRUCTIONS
You were seen in the emergency department for nausea and vomiting during pregnancy and constipation  Your lab work did not show any concerning abnormalities  Your symptoms were treated with IV fluids and Reglan  Continue taking Reglan at home  You were given a Dulcolax suppository for constipation  Start using 1 5 capfuls of MiraLAX daily  Follow-up with your OB/GYN as soon as possible  If your nausea and vomiting worsens or you begin to have worsening abdominal pain please return to the emergency department

## 2023-01-10 NOTE — ED ATTENDING ATTESTATION
1/9/2023   IApril MD, saw and evaluated the patient  I have discussed the patient with the resident/non-physician practitioner and agree with the resident's/non-physician practitioner's findings, Plan of Care, and MDM as documented in the resident's/non-physician practitioner's note, except where noted  All available labs and Radiology studies were reviewed  I was present for key portions of any procedure(s) performed by the resident/non-physician practitioner and I was immediately available to provide assistance  At this point I agree with the current assessment done in the Emergency Department  I have conducted an independent evaluation of this patient a history and physical is as follows:    Chief Complaint   Patient presents with   • Abdominal Pain Pregnant     15 wks, reports vomiting and generalized weakness          44 female with PMH of HTN, DM, nausea, vomiting, soreness in the abdomen  Is currently 14w3d pregnant  Vomiting, non-bloody, non-bilious  Zofran, B6 and reglan did not help  Dull frontal headache  No fevers or chills  No URI symptoms  No shortness of breath  No vaginal bleeding  Has had constipation, only small BM over the past week  Has been taking MiraLax without much relief  Abdominal soreness    Physical Exam  /93 (BP Location: Right arm)   Pulse (!) 108   Temp 97 6 °F (36 4 °C) (Tympanic)   Resp 20   Ht 5' 7" (1 702 m)   Wt 132 kg (290 lb)   SpO2 99%   BMI 45 42 kg/m²      Vital signs and nursing notes reviewed    ** IF YOU ARE READING THIS, THE EXAM TEMPLATE BELOW HAS NOT BEEN UPDATED**    CONSTITUTIONAL: female appearing stated age resting in bed, in no acute distress  HEENT: atraumatic, normocephalic  Sclera anicteric, conjunctiva are not injected  Moist oral mucosa  CARDIOVASCULAR/CHEST: RRR, no M/R/G  2+ radial pulses  PULMONARY: Breathing comfortably on RA  Breath sounds are equal and clear to auscultation  ABDOMEN: non-distended   BS present, normoactive  Non-tender  MSK: moves all extremities, no deformities, no peripheral edema, no calf asymmetry  NEURO: Awake, alert, and oriented x 3  Face symmetric  Moves all extremities spontaneously  No focal neurologic deficits  SKIN: Warm, appears well-perfused  MENTAL STATUS: Normal affect      Labs and Imaging  Labs Reviewed - No data to display    No orders to display         Procedures  Procedures        ED Course  Medications - No data to display     DDx: morning sickness, viral illness, constipation with associated N/V, pancreatitis, cholelithiasis/cholecystitis, cholestasis of pregnancy     CBC, CMP, lipase, urinalysis     Bedside ultrasound to ensure pregnancy viability    Symptom control: reglan, IV fluids

## 2023-01-10 NOTE — ED PROVIDER NOTES
History  Chief Complaint   Patient presents with   • Abdominal Pain Pregnant     15 wks, reports vomiting and generalized weakness      59-year-old  female at 14w3d with a history of asthma, diabetes, hypertension not currently on medication, and hyperlipidemia who presents with nausea and vomiting for the past 2 days  Patient states that she has had issues with nausea throughout her pregnancy however it has been worse over the last 2 days  The patient states that yesterday it was manageable but today she has vomited 12 times  The patient states that she was able to eat once today  The patient states that she has only been able to keep down a small amount of fluid  The patient states that she has had soreness throughout her abdomen since she has been vomiting  The patient also states that she has experiencing constipation  Patient states that she has only been able to have small bowel movements for the past week  The patient has been using MiraLAX without significant relief  The patient also states that she had a dull frontal headache earlier that has improved  The patient denies fever, chills, URI symptoms, chest pain, shortness of breath, dysuria, vaginal bleeding, and leaking of fluid  Prior to Admission Medications   Prescriptions Last Dose Informant Patient Reported? Taking?    Blood Glucose Monitoring Suppl (OneTouch Verio) w/Device KIT   No No   Sig: Use 2 (two) times a day Testing sugars 2xs daily   Blood Pressure KIT   No No   Sig: by Does not apply route daily   Patient not taking: No sig reported   OneTouch Delica Lancets 65K MISC   No No   Sig: Use 2 (two) times a day Testing sugars 2xs daily   albuterol (PROVENTIL HFA,VENTOLIN HFA) 90 mcg/act inhaler   No No   Sig: Inhale 2 puffs every 6 (six) hours as needed for wheezing or shortness of breath   atorvastatin (LIPITOR) 10 mg tablet   No No   Sig: Take 1 tablet (10 mg total) by mouth daily   carboxymethylcellulose 0 5 % SOLN   No No   Sig: Administer 2 drops to both eyes 3 (three) times a day as needed for dry eyes   cetirizine (ZyrTEC) 10 mg tablet   No No   Sig: Take 1 tablet (10 mg total) by mouth daily   ferrous sulfate 324 (65 Fe) mg   No No   Sig: Take 1 tablet (324 mg total) by mouth every other day   glucose blood (OneTouch Verio) test strip   No No   Sig: Testing sugars 2xs daily   metFORMIN (GLUCOPHAGE) 1000 MG tablet   No No   Sig: Take 1 tablet (1,000 mg total) by mouth 2 (two) times a day with meals      Facility-Administered Medications: None       Past Medical History:   Diagnosis Date   • Dental abscess 10/23/2020   • Hypokalemia 6/8/2020   • Pseudotumor cerebri        Past Surgical History:   Procedure Laterality Date   • TONSILLECTOMY         No family history on file  I have reviewed and agree with the history as documented  E-Cigarette/Vaping   • E-Cigarette Use Never User      E-Cigarette/Vaping Substances   • Nicotine No    • THC No    • CBD No    • Flavoring No    • Other No    • Unknown No      Social History     Tobacco Use   • Smoking status: Every Day     Packs/day: 0 25     Types: Cigarettes   • Smokeless tobacco: Never   • Tobacco comments:     pt quit 27 days ago   Vaping Use   • Vaping Use: Never used   Substance Use Topics   • Alcohol use: No   • Drug use: No        Review of Systems   Constitutional: Negative for chills, diaphoresis and fever  HENT: Negative for congestion and sore throat  Eyes: Negative for pain, redness and visual disturbance  Respiratory: Negative for cough and shortness of breath  Cardiovascular: Negative for chest pain, palpitations and leg swelling  Gastrointestinal: Positive for abdominal pain, constipation, nausea and vomiting  Negative for blood in stool and diarrhea  Genitourinary: Negative for dysuria, flank pain, hematuria, vaginal bleeding and vaginal discharge  Musculoskeletal: Negative for arthralgias and myalgias     Skin: Negative for color change, pallor and rash  Neurological: Positive for headaches  Negative for dizziness, syncope, weakness, light-headedness and numbness  All other systems reviewed and are negative  Physical Exam  ED Triage Vitals [01/09/23 1921]   Temperature Pulse Respirations Blood Pressure SpO2   97 6 °F (36 4 °C) (!) 130 20 (!) 176/113 99 %      Temp Source Heart Rate Source Patient Position - Orthostatic VS BP Location FiO2 (%)   Tympanic Monitor Sitting Right arm --      Pain Score       --             Orthostatic Vital Signs  Vitals:    01/09/23 1921 01/09/23 2357 01/09/23 2358   BP: (!) 176/113  153/93   Pulse: (!) 130 (!) 108    Patient Position - Orthostatic VS: Sitting Lying        Physical Exam  Vitals and nursing note reviewed  Constitutional:       General: She is not in acute distress  Appearance: Normal appearance  She is not ill-appearing, toxic-appearing or diaphoretic  HENT:      Head: Normocephalic and atraumatic  Nose: Nose normal  No congestion or rhinorrhea  Mouth/Throat:      Mouth: Mucous membranes are moist       Pharynx: Oropharynx is clear  Eyes:      General: No scleral icterus  Extraocular Movements: Extraocular movements intact  Conjunctiva/sclera: Conjunctivae normal       Pupils: Pupils are equal, round, and reactive to light  Cardiovascular:      Rate and Rhythm: Normal rate and regular rhythm  Pulses: Normal pulses  Heart sounds: Normal heart sounds  No murmur heard  No friction rub  No gallop  Pulmonary:      Effort: Pulmonary effort is normal       Breath sounds: Normal breath sounds  No wheezing, rhonchi or rales  Abdominal:      General: Abdomen is flat  Palpations: Abdomen is soft  Tenderness: There is abdominal tenderness  There is no right CVA tenderness, left CVA tenderness, guarding or rebound  Comments: Mild diffuse abdominal tenderness  Musculoskeletal:         General: No swelling, tenderness, deformity or signs of injury  Normal range of motion  Cervical back: Normal range of motion and neck supple  No rigidity or tenderness  Right lower leg: No edema  Left lower leg: No edema  Lymphadenopathy:      Cervical: No cervical adenopathy  Skin:     General: Skin is warm and dry  Capillary Refill: Capillary refill takes less than 2 seconds  Coloration: Skin is not jaundiced or pale  Findings: No bruising, erythema, lesion or rash  Neurological:      General: No focal deficit present  Mental Status: She is alert and oriented to person, place, and time  ED Medications  Medications   bisacodyl (DULCOLAX) rectal suppository 10 mg (has no administration in time range)   metoclopramide (REGLAN) injection 10 mg (10 mg Intravenous Given 1/10/23 0039)   multi-electrolyte (ISOLYTE-S PH 7 4) bolus 1,000 mL (1,000 mL Intravenous New Bag 1/10/23 0039)       Diagnostic Studies  Results Reviewed     Procedure Component Value Units Date/Time    UA (URINE) with reflex to Scope [[id number]] Collected: 01/10/23 0210    Lab Status:  In process Specimen: Urine, Clean Catch Updated: 01/10/23 0215    Comprehensive metabolic panel [[id number]]  (Abnormal) Collected: 01/10/23 0039    Lab Status: Final result Specimen: Blood from Arm, Right Updated: 01/10/23 0158     Sodium 136 mmol/L      Potassium 3 4 mmol/L      Chloride 106 mmol/L      CO2 21 mmol/L      ANION GAP 9 mmol/L      BUN 13 mg/dL      Creatinine 0 65 mg/dL      Glucose 144 mg/dL      Calcium 9 2 mg/dL      Corrected Calcium 9 8 mg/dL      AST <5 U/L      ALT 11 U/L      Alkaline Phosphatase 55 U/L      Total Protein 7 4 g/dL      Albumin 3 3 g/dL      Total Bilirubin 0 26 mg/dL      eGFR 112 ml/min/1 73sq m     Narrative:      [place] guidelines for Chronic Kidney Disease (CKD):   •  Stage 1 with normal or high GFR (GFR > 90 mL/min/1 73 square meters)  •  Stage 2 Mild CKD (GFR = 60-89 mL/min/1 73 square meters)  •  Stage 3A Moderate CKD (GFR = 45-59 mL/min/1 73 square meters)  •  Stage 3B Moderate CKD (GFR = 30-44 mL/min/1 73 square meters)  •  Stage 4 Severe CKD (GFR = 15-29 mL/min/1 73 square meters)  •  Stage 5 End Stage CKD (GFR <15 mL/min/1 73 square meters)  Note: GFR calculation is accurate only with a steady state creatinine    Lipase [[id number]]  (Normal) Collected: 01/10/23 0039    Lab Status: Final result Specimen: Blood from Arm, Right Updated: 01/10/23 0118     Lipase 142 u/L     CBC and differential [[id number]]  (Abnormal) Collected: 01/10/23 0039    Lab Status: Final result Specimen: Blood from Arm, Right Updated: 01/10/23 0111     WBC 10 91 Thousand/uL      RBC 3 79 Million/uL      Hemoglobin 10 6 g/dL      Hematocrit 32 1 %      MCV 85 fL      MCH 28 0 pg      MCHC 33 0 g/dL      RDW 13 0 %      MPV 11 5 fL      Platelets 275 Thousands/uL      nRBC 0 /100 WBCs      Neutrophils Relative 67 %      Immat GRANS % 1 %      Lymphocytes Relative 26 %      Monocytes Relative 5 %      Eosinophils Relative 1 %      Basophils Relative 0 %      Neutrophils Absolute 7 37 Thousands/µL      Immature Grans Absolute 0 09 Thousand/uL      Lymphocytes Absolute 2 79 Thousands/µL      Monocytes Absolute 0 52 Thousand/µL      Eosinophils Absolute 0 10 Thousand/µL      Basophils Absolute 0 04 Thousands/µL                  No orders to display         Procedures  POC Pelvic US    Date/Time: 1/10/2023 1:30 AM  Performed by: Hasmukh Segal DO  Authorized by: Hasmukh Segal DO     Patient location:  ED  Other Assisting Provider: Yes (comment) (Dr Monica Russo)    Procedure details:     Exam Type:  Diagnostic    Indications: evaluate for IUP and pregnant with abdominal pain      Assessment for: evaluate fetal viability      Views obtained: uterus (transverse and sagittal)      Image quality: diagnostic      Image availability:  Images available in PACS  Uterine findings:     Intrauterine pregnancy: identified      Single gestation: identified      Fetal heart rate: identified      Fetal heart rate (bpm):  170  Right adnexa findings:     Right ovary:  Not visualized  Left adnexa findings:     Left ovary:  Not visualized  Other findings:     Free pelvic fluid: not identified      Free peritoneal fluid: not identified    Interpretation:     Ultrasound impressions: normal      Pregnancy findings: intrauterine pregnancy (IUP)            ED Course                             SBIRT 22yo+    Flowsheet Row Most Recent Value   SBIRT (25 yo +)    In order to provide better care to our patients, we are screening all of our patients for alcohol and drug use  Would it be okay to ask you these screening questions? Unable to answer at this time Filed at: 01/10/2023 0012                Medical Decision Making  42-year-old  female at 14w3d with a history of asthma, diabetes, hypertension not currently on medication, and hyperlipidemia who presents with nausea and vomiting for the past 2 days  Patient was initially hypertensive at 176/113 and tachycardic at 130  The remainder of the patient's vitals are within the normal limits  Repeat vitals in the room show a blood pressure of 153/93 and a heart rate of 108  On exam heart is regular rate and rhythm, lungs are clear to auscultation bilaterally, abdomen is soft with mild diffuse tenderness that the patient describes a soreness  Differential diagnosis includes but is not limited to sickness, vomiting secondary to constipation, pancreatitis, gallbladder pathology  Will order CBC, CMP, lipase, UA  We will treat the patient symptomatically with Reglan and IV fluids  Will do bedside ultrasound to assess the fetus  Bedside ultrasound shows an intrauterine pregnancy with good fetal movement  Fetal heart rate is 170  CBC shows a WBC count of 10 91 however the patient has been vomiting  The slight WBC elevation is likely due to demargination  The patient's CMP shows mild hypokalemia of 3 4  The patient is instructed to eat more potassium rich foods  Patient reports improvement in her symptoms after Reglan and IV fluids  The patient is able to tolerate p o  intake  The patient is instructed to continue to take Reglan at home  A Dulcolax suppository is ordered  The patient is instructed to increase MiraLAX dose to 1 5 capfuls per day and titrate up 1 soft bowel movement per day  The patient is instructed to follow-up with her OB/GYN  The patient is signed out to Dr Duran with UA pending with plan to discharge and treat for UTI if indicated  Abdominal pain: acute illness or injury  Constipation: acute illness or injury  Nausea and vomiting during pregnancy: acute illness or injury  Amount and/or Complexity of Data Reviewed  Labs: ordered  Risk  OTC drugs  Prescription drug management              Disposition  Final diagnoses:   Nausea and vomiting during pregnancy   Abdominal pain   Constipation     Time reflects when diagnosis was documented in both MDM as applicable and the Disposition within this note     Time User Action Codes Description Comment    1/10/2023  2:08 AM Annelise Cough, Eyal Armen A Add [O21 9] Nausea and vomiting during pregnancy     1/10/2023  2:09 AM Deming Lawtell A Add [R10 9] Abdominal pain     1/10/2023  2:09 AM Deming Sherry A Add [K59 00] Constipation       ED Disposition     None      Follow-up Information     Follow up With Specialties Details Why Contact Info Additional [address] [facility] Emergency Medicine Go to  If symptoms worsen [address] [id number]  [address] Emergency Department, [address], [address] [place] [address] and Gynecology Schedule an appointment as soon as possible for a visit   [facility] [address] [phone]             Patient's Medications Discharge Prescriptions    No medications on file     No discharge procedures on file  PDMP Review       Value Time User    PDMP Reviewed  Yes 4/30/2020  3:56 PM Oralia Arellano MD           ED Provider  Attending physically available and evaluated Jonathan Palacios I managed the patient along with the ED Attending      Electronically Signed by         Micaela Person DO  01/10/23 5728

## 2023-01-10 NOTE — ED ATTENDING ATTESTATION
1/9/2023   IMariela MD, saw and evaluated the patient  I have discussed the patient with the resident/non-physician practitioner and agree with the resident's/non-physician practitioner's findings, Plan of Care, and MDM as documented in the resident's/non-physician practitioner's note, except where noted  All available labs and Radiology studies were reviewed  I was present for key portions of any procedure(s) performed by the resident/non-physician practitioner and I was immediately available to provide assistance  At this point I agree with the current assessment done in the Emergency Department  I have conducted an independent evaluation of this patient a history and physical is as follows:    Chief Complaint   Patient presents with   • Abdominal Pain Pregnant     15 wks, reports vomiting and generalized weakness          44 female with PMH of HTN, DM, nausea, vomiting, soreness in the abdomen  Is currently 14w3d pregnant  Vomiting, non-bloody, non-bilious  Zofran, B6 and reglan did not help  Dull frontal headache  No fevers or chills  No URI symptoms  No shortness of breath  No vaginal bleeding  Has had constipation, only small BM over the past week  Has been taking MiraLax without much relief  Abdominal soreness    Physical Exam  /93 (BP Location: Right arm)   Pulse (!) 108   Temp 97 6 °F (36 4 °C) (Tympanic)   Resp 20   Ht 5' 7" (1 702 m)   Wt 132 kg (290 lb)   SpO2 99%   BMI 45 42 kg/m²      Vital signs and nursing notes reviewed    ** IF YOU ARE READING THIS, THE EXAM TEMPLATE BELOW HAS NOT BEEN UPDATED**    CONSTITUTIONAL: female appearing stated age resting in bed, in no acute distress  HEENT: atraumatic, normocephalic  Sclera anicteric, conjunctiva are not injected  Moist oral mucosa  CARDIOVASCULAR/CHEST: RRR, no M/R/G  2+ radial pulses  PULMONARY: Breathing comfortably on RA  Breath sounds are equal and clear to auscultation  ABDOMEN: non-distended   BS present, normoactive  Non-tender  MSK: moves all extremities, no deformities, no peripheral edema, no calf asymmetry  NEURO: Awake, alert, and oriented x 3  Face symmetric  Moves all extremities spontaneously  No focal neurologic deficits  SKIN: Warm, appears well-perfused  MENTAL STATUS: Normal affect      Labs and Imaging  Labs Reviewed - No data to display    No orders to display         Procedures  Procedures        ED Course  Medications - No data to display     DDx: morning sickness, viral illness, constipation with associated N/V, pancreatitis, cholelithiasis/cholecystitis, cholestasis of pregnancy     CBC, CMP, lipase, urinalysis     Bedside ultrasound to ensure pregnancy viability    Symptom control: reglan, IV fluids

## 2023-01-12 PROBLEM — J30.1 SEASONAL ALLERGIC RHINITIS DUE TO POLLEN: Status: RESOLVED | Noted: 2020-06-04 | Resolved: 2023-01-12

## 2023-01-12 PROBLEM — Z3A.14 14 WEEKS GESTATION OF PREGNANCY: Status: ACTIVE | Noted: 2023-01-12

## 2023-01-22 ENCOUNTER — HOSPITAL ENCOUNTER (EMERGENCY)
Facility: HOSPITAL | Age: 38
Discharge: HOME | End: 2023-01-22
Attending: INTERNAL MEDICINE | Admitting: INTERNAL MEDICINE

## 2023-01-22 VITALS
TEMPERATURE: 99.7 F | SYSTOLIC BLOOD PRESSURE: 149 MMHG | DIASTOLIC BLOOD PRESSURE: 85 MMHG | RESPIRATION RATE: 18 BRPM | WEIGHT: 293 LBS | HEART RATE: 126 BPM | BODY MASS INDEX: 46.41 KG/M2 | OXYGEN SATURATION: 100 %

## 2023-01-22 DIAGNOSIS — H00.011 HORDEOLUM EXTERNUM RIGHT UPPER EYELID: Primary | ICD-10-CM

## 2023-01-22 RX ORDER — ERYTHROMYCIN 5 MG/G
0.5 OINTMENT OPHTHALMIC EVERY 6 HOURS
Qty: 3.5 G | Refills: 0 | Status: SHIPPED | OUTPATIENT
Start: 2023-01-22 | End: 2023-02-01

## 2023-01-22 RX ORDER — ACETAMINOPHEN 500 MG
500 TABLET ORAL EVERY 6 HOURS PRN
Qty: 30 TABLET | Refills: 0 | Status: SHIPPED | OUTPATIENT
Start: 2023-01-22

## 2023-01-22 NOTE — Clinical Note
Gallo Lopez was seen and treated in our emergency department on 1/22/2023  Diagnosis:     Mg Garner  may return to work on return date  She may return on this date: 01/25/2023         If you have any questions or concerns, please don't hesitate to call        Yesica Doe PA-C    ______________________________           _______________          _______________  Hospital Representative                              Date                                Time

## 2023-01-22 NOTE — ED PROVIDER NOTES
History  Chief Complaint   Patient presents with   • Eye Redness      2 days- right side  26-year-old female no contact lens use presents for evaluation of right eyelid irritation, redness and swelling which began yesterday  No vision changes  No vision loss  No headaches  No difficulty moving her eye  Prior to Admission Medications   Prescriptions Last Dose Informant Patient Reported? Taking? Blood Glucose Monitoring Suppl (OneTouch Verio) w/Device KIT   No No   Sig: Use 2 (two) times a day Testing sugars 2xs daily   OneTouch Delica Lancets 97R MISC   No No   Sig: Use 2 (two) times a day Testing sugars 2xs daily   albuterol (PROVENTIL HFA,VENTOLIN HFA) 90 mcg/act inhaler   No No   Sig: Inhale 2 puffs every 6 (six) hours as needed for wheezing or shortness of breath   atorvastatin (LIPITOR) 10 mg tablet   No No   Sig: Take 1 tablet (10 mg total) by mouth daily   carboxymethylcellulose 0 5 % SOLN   No No   Sig: Administer 2 drops to both eyes 3 (three) times a day as needed for dry eyes   cetirizine (ZyrTEC) 10 mg tablet   No No   Sig: Take 1 tablet (10 mg total) by mouth daily   ferrous sulfate 324 (65 Fe) mg   No No   Sig: Take 1 tablet (324 mg total) by mouth every other day   glucose blood (OneTouch Verio) test strip   No No   Sig: Testing sugars 2xs daily   metFORMIN (GLUCOPHAGE) 1000 MG tablet   No No   Sig: Take 1 tablet (1,000 mg total) by mouth 2 (two) times a day with meals      Facility-Administered Medications: None       Past Medical History:   Diagnosis Date   • Dental abscess 10/23/2020   • Hypokalemia 6/8/2020   • Pseudotumor cerebri        Past Surgical History:   Procedure Laterality Date   • TONSILLECTOMY         History reviewed  No pertinent family history  I have reviewed and agree with the history as documented      E-Cigarette/Vaping   • E-Cigarette Use Never User      E-Cigarette/Vaping Substances   • Nicotine No    • THC No    • CBD No    • Flavoring No    • Other No • Unknown No      Social History     Tobacco Use   • Smoking status: Every Day     Packs/day: 0 25     Types: Cigarettes   • Smokeless tobacco: Never   • Tobacco comments:     pt quit 27 days ago   Vaping Use   • Vaping Use: Never used   Substance Use Topics   • Alcohol use: No   • Drug use: No       Review of Systems   Constitutional: Negative for chills, fatigue and fever  HENT: Negative for congestion, ear pain, rhinorrhea and sore throat  Eyes: Positive for pain and redness  Respiratory: Negative for chest tightness and shortness of breath  Cardiovascular: Negative for chest pain and palpitations  Gastrointestinal: Negative for abdominal pain, nausea and vomiting  Genitourinary: Negative for dysuria and hematuria  Musculoskeletal: Negative  Skin: Negative for rash  Neurological: Negative for dizziness, syncope, light-headedness and numbness  Physical Exam  Physical Exam  Vitals and nursing note reviewed  Constitutional:       Appearance: She is well-developed  HENT:      Head: Normocephalic  Eyes:      General: No scleral icterus  Comments: Was are equal and round reactive to light  Normal extraocular movements without pain  Very scant scleral injection  No discharge appreciated  Cardiovascular:      Rate and Rhythm: Normal rate and regular rhythm  Pulmonary:      Effort: Pulmonary effort is normal       Breath sounds: Normal breath sounds  No stridor  Abdominal:      General: There is no distension  Palpations: Abdomen is soft  Tenderness: There is no abdominal tenderness  Musculoskeletal:         General: Normal range of motion  Skin:     General: Skin is warm and dry  Capillary Refill: Capillary refill takes less than 2 seconds  Neurological:      Mental Status: She is alert and oriented to person, place, and time           Vital Signs  ED Triage Vitals [01/22/23 1005]   Temperature Pulse Respirations Blood Pressure SpO2   99 7 °F (37 6 °C) (!) 126 18 149/85 100 %      Temp src Heart Rate Source Patient Position - Orthostatic VS BP Location FiO2 (%)   -- Monitor Sitting Left arm --      Pain Score       10 - Worst Possible Pain           Vitals:    01/22/23 1005   BP: 149/85   Pulse: (!) 126   Patient Position - Orthostatic VS: Sitting         Visual Acuity  Visual Acuity    Flowsheet Row Most Recent Value   Visual acuity R eye is 20/25   Visual acuity Left eye is 20/20   Visual acuity in both eyes is 20/20   Wearing corrective eyewear/lenses? Yes          ED Medications  Medications - No data to display    Diagnostic Studies  Results Reviewed     None                 No orders to display              Procedures  Procedures         ED Course                                             Medical Decision Making  42-year-old female presents for evaluation of right eyelid irritation and pain which began yesterday  Patient denies any vision changes and reports no crusting or discharge denies any foreign body sensation  Physical exam is reassuring and consistent with a hordeolum, patient vies use a warm compress and massage the area  Erythromycin prescribed in the event that this has a bacterial component as well as a preventative measure for conjunctivitis  Pt is pregnant, FHT noted at 80 by nursing staff, no pregnancy related complaints, patient advised to use only tylenol for pain control  Strict return to ED precautions discussed  Patient recommended to follow up promptly with appropriate outpatient provider  Patient and/or family members verbalizes understanding and agrees with plan  Patient is stable for discharge      Portions of the record may have been created with voice recognition software  Occasional wrong word or "sound a like" substitutions may have occurred due to the inherent limitations of voice recognition software  Read the chart carefully and recognize, using context, where substitutions have occurred        Hordeolum externum right upper eyelid: acute illness or injury  Risk  OTC drugs  Prescription drug management  Disposition  Final diagnoses:   Hordeolum externum right upper eyelid     Time reflects when diagnosis was documented in both MDM as applicable and the Disposition within this note     Time User Action Codes Description Comment    1/22/2023 10:49 AM Meaghan Duran [[id number]] Hordeolum externum right upper eyelid       ED Disposition     ED Disposition   Discharge    Condition   Stable    Date/Time   Sun Jan 22, 2023 10:48 AM    Comment   Ernst Jensen discharge to home/self care                 Follow-up Information     Follow up With Specialties Details Why Contact Info Additional [address] [place] [facility] Schedule an appointment as soon as possible for a visit in 2 days As needed, If symptoms worsen [facility] Emergency Department Emergency Medicine  If symptoms worsen [id number] Emergency Department          Discharge Medication List as of 1/22/2023 10:49 AM      START taking these medications    Details   acetaminophen (TYLENOL) 500 mg tablet Take 1 tablet (500 mg total) by mouth every 6 (six) hours as needed for moderate pain, Starting Sun 1/22/2023, Normal      erythromycin (ILOTYCIN) ophthalmic ointment Administer 0 5 inches to both eyes every 6 (six) hours for 10 days, Starting Sun 1/22/2023, Until Wed 2/1/2023, Normal         CONTINUE these medications which have NOT CHANGED    Details   albuterol (PROVENTIL HFA,VENTOLIN HFA) 90 mcg/act inhaler Inhale 2 puffs every 6 (six) hours as needed for wheezing or shortness of breath, Starting Thu 4/7/2022, Normal      atorvastatin (LIPITOR) 10 mg tablet Take 1 tablet (10 mg total) by mouth daily, Starting Mon 10/10/2022, Normal      Blood Glucose Monitoring Suppl (OneTouch Verio) w/Device KIT Use 2 (two) times a day Testing sugars 2xs daily, Starting Sat 7/16/2022, Normal      carboxymethylcellulose 0 5 % SOLN Administer 2 drops to both eyes 3 (three) times a day as needed for dry eyes, Starting Mon 10/10/2022, Normal      cetirizine (ZyrTEC) 10 mg tablet Take 1 tablet (10 mg total) by mouth daily, Starting Wed 7/13/2022, Normal      ferrous sulfate 324 (65 Fe) mg Take 1 tablet (324 mg total) by mouth every other day, Starting Wed 4/13/2022, Normal      glucose blood (OneTouch Verio) test strip Testing sugars 2xs daily, Normal      metFORMIN (GLUCOPHAGE) 1000 MG tablet Take 1 tablet (1,000 mg total) by mouth 2 (two) times a day with meals, Starting Thu 8/11/2022, Normal      OneTouch Delica Lancets 79W MISC Use 2 (two) times a day Testing sugars 2xs daily, Starting Sat 7/16/2022, Normal             No discharge procedures on file      PDMP Review       Value Time User    PDMP Reviewed  Yes 4/30/2020  3:56 PM Stacey Kimbrough MD          ED Provider  Electronically Signed by           Ty Nolan PA-C  01/22/23 0208

## 2023-01-28 DIAGNOSIS — E78.2 MIXED HYPERLIPIDEMIA: ICD-10-CM

## 2023-01-29 DIAGNOSIS — E11.9 TYPE 2 DIABETES MELLITUS WITHOUT COMPLICATION, WITHOUT LONG-TERM CURRENT USE OF INSULIN (HCC): ICD-10-CM

## 2023-01-30 RX ORDER — ATORVASTATIN CALCIUM 10 MG/1
TABLET, FILM COATED ORAL
Qty: 30 TABLET | Refills: 3 | Status: SHIPPED | OUTPATIENT
Start: 2023-01-30

## 2023-01-31 RX ORDER — BLOOD SUGAR DIAGNOSTIC
STRIP MISCELLANEOUS
Qty: 100 STRIP | Refills: 3 | Status: SHIPPED | OUTPATIENT
Start: 2023-01-31

## 2023-03-13 PROBLEM — Z00.00 HEALTHCARE MAINTENANCE: Status: RESOLVED | Noted: 2022-08-11 | Resolved: 2023-03-13

## 2023-04-30 RX ORDER — ASPIRIN 81 MG/1
162 TABLET ORAL DAILY
COMMUNITY
Start: 2022-12-22 | End: 2023-12-22

## 2023-04-30 RX ORDER — INSULIN GLARGINE 100 [IU]/ML
INJECTION, SOLUTION SUBCUTANEOUS
COMMUNITY
Start: 2023-04-21

## 2023-04-30 RX ORDER — OMEPRAZOLE 20 MG/1
20 CAPSULE, DELAYED RELEASE ORAL DAILY
COMMUNITY
Start: 2023-04-13 | End: 2023-10-10

## 2023-04-30 NOTE — PROGRESS NOTES
[place] [id number] [facility], [address]  Phone#  [phone]  Fax#  [phone]    ASSESSMENT and PLAN  Type 2 diabetes mellitus without complication, without long-term current use of insulin ([facility] [address] )    Lab Results   Component Value Date    HGBA1C 7 4 (A) 05/01/2023   · Congratulated patient on improved A1C (Last A1c prior was 8 1 and then before was 10 1)  · Diabetic foot exam performed 7/13/2022  · UTD on Pneumonia vaccine   · Ophthalmologic evaluation 9/13 with  Ophthalmology diabetic retinopathy  · Statin at 40  · BMP, Micro/Cr ordered  Advised to obtain this blood work post pregnancy  · Continue to hold metformin   · Continue insulin management per endocrinology    Benign essential HTN  · BP today 128/70  · High risk for preeclampsia  · Continue aspirin 162 mg daily    Diagnoses and all orders for this visit:    Type 2 diabetes mellitus without complication, without long-term current use of insulin ([facility])  -     POCT hemoglobin A1c    Benign essential HTN    Other orders  -     insulin lispro (HumaLOG) 100 units/mL injection; 20 units before breakfast, 20 units before lunch and 34 units before dinner  Titrate up to total daily dose of 100 units  -     aspirin (ECOTRIN LOW STRENGTH) 81 mg EC tablet; Take 162 mg by mouth daily  -     Insulin Glargine Solostar (Lantus SoloStar) 100 UNIT/ML SOPN; 48 units in the morning 94units hs   Titrate up to total daily dose of 200 unit  -     omeprazole (PriLOSEC) 20 mg delayed release capsule; Take 20 mg by mouth daily        HISTORY OF PRESENT ILLNESS  Lesley Gifford is a 44 y o  female 31 weeks 3 dayswith a significant PMHx of diabetes, hypertension who presents to the clinic for  management of their chronic medical conditions  Patient's medical conditions are stable unless noted otherwise above  Patient has not had any recent hospitalizations, or medical emergencies since last visit    Patient has no further complaints other than what is mentioned in the ROS  REVIEW OF SYSTEMS  Review of Systems   Constitutional: Negative for chills, fatigue and fever  HENT: Negative for sore throat  Respiratory: Negative for cough, chest tightness and shortness of breath  Cardiovascular: Negative for chest pain and leg swelling  Gastrointestinal: Negative for constipation, diarrhea, nausea and vomiting  Endocrine: Negative for polydipsia, polyphagia and polyuria  Genitourinary: Negative for dysuria  Musculoskeletal: Negative for arthralgias  Skin: Negative for rash  Neurological: Negative for dizziness, light-headedness and headaches  Psychiatric/Behavioral: Negative for agitation and behavioral problems         PAST MEDICAL HISTORY   Past Medical History:   Diagnosis Date    Dental abscess 10/23/2020    Hypokalemia 6/8/2020    Pseudotumor cerebri      Past Surgical History:   Procedure Laterality Date    TONSILLECTOMY       Social History     Socioeconomic History    Marital status: Single     Spouse name: Not on file    Number of children: Not on file    Years of education: Not on file    Highest education level: Not on file   Occupational History    Not on file   Tobacco Use    Smoking status: Every Day     Packs/day: 0 25     Types: Cigarettes    Smokeless tobacco: Never    Tobacco comments:     pt quit 27 days ago   Vaping Use    Vaping Use: Never used   Substance and Sexual Activity    Alcohol use: No    Drug use: No    Sexual activity: Not on file   Other Topics Concern    Not on file   Social History Narrative    Not on file     Social Determinants of Health     Financial Resource Strain: Low Risk     Difficulty of Paying Living Expenses: Not hard at all   Food Insecurity: No Food Insecurity    Worried About Running Out of Food in the Last Year: Never true    Aleksey of Food in the Last Year: Never true   Transportation Needs: No Transportation Needs    Lack of Transportation (Medical): No    Lack of Transportation (Non-Medical): No   Physical Activity: Not on file   Stress: Not on file   Social Connections: Not on file   Intimate Partner Violence: Not on file   Housing Stability: Not on file     No family history on file  MEDICATIONS    Current Outpatient Medications:     aspirin (ECOTRIN LOW STRENGTH) 81 mg EC tablet, Take 162 mg by mouth daily, Disp: , Rfl:     Insulin Glargine Solostar (Lantus SoloStar) 100 UNIT/ML SOPN, 48 units in the morning 94units hs   Titrate up to total daily dose of 200 unit, Disp: , Rfl:     insulin lispro (HumaLOG) 100 units/mL injection, 20 units before breakfast, 20 units before lunch and 34 units before dinner   Titrate up to total daily dose of 100 units, Disp: , Rfl:     omeprazole (PriLOSEC) 20 mg delayed release capsule, Take 20 mg by mouth daily, Disp: , Rfl:     albuterol (PROVENTIL HFA,VENTOLIN HFA) 90 mcg/act inhaler, Inhale 2 puffs every 6 (six) hours as needed for wheezing or shortness of breath, Disp: 6 7 g, Rfl: 0    atorvastatin (LIPITOR) 10 mg tablet, TAKE 1 TABLET BY MOUTH EVERY DAY, Disp: 30 tablet, Rfl: 3    Blood Glucose Monitoring Suppl (OneTouch Verio) w/Device KIT, Use 2 (two) times a day Testing sugars 2xs daily, Disp: 1 kit, Rfl: 0    carboxymethylcellulose 0 5 % SOLN, Administer 2 drops to both eyes 3 (three) times a day as needed for dry eyes, Disp: 1 each, Rfl: 2    ferrous sulfate 324 (65 Fe) mg, Take 1 tablet (324 mg total) by mouth every other day, Disp: 60 tablet, Rfl: 1    metFORMIN (GLUCOPHAGE) 1000 MG tablet, Take 1 tablet (1,000 mg total) by mouth 2 (two) times a day with meals, Disp: 90 tablet, Rfl: 0    OneTouch Delica Lancets 46B MISC, Use 2 (two) times a day Testing sugars 2xs daily, Disp: 100 each, Rfl: 3    OneTouch Verio test strip, TESTING SUGARS 2XS DAILY, Disp: 100 strip, Rfl: 3    PHYSICAL EXAM  Vitals:    05/01/23 1257   BP: 128/70   BP Location: Left arm   Patient Position: Sitting   Cuff "Size: Standard   Pulse: (!) 132   Resp: 18   Temp: 97 9 °F (36 6 °C)   TempSrc: Temporal   SpO2: 98%   Weight: 135 kg (298 lb 11 2 oz)   Height: 5' 7\" (1 702 m)     Wt Readings from Last 3 Encounters:   05/01/23 135 kg (298 lb 11 2 oz)   01/22/23 134 kg (296 lb 4 8 oz)   01/09/23 132 kg (290 lb)    , Body mass index is 46 78 kg/m²  Physical Exam  Constitutional:       Appearance: She is well-developed  HENT:      Head: Normocephalic and atraumatic  Eyes:      Pupils: Pupils are equal, round, and reactive to light  Cardiovascular:      Rate and Rhythm: Normal rate and regular rhythm  Heart sounds: Normal heart sounds  Pulmonary:      Effort: Pulmonary effort is normal       Breath sounds: Normal breath sounds  Abdominal:      General: Bowel sounds are normal       Palpations: Abdomen is soft  Comments: Gravid   Musculoskeletal:         General: Normal range of motion  Cervical back: Normal range of motion and neck supple  Skin:     General: Skin is warm  Findings: No erythema or rash  Neurological:      Mental Status: She is alert and oriented to person, place, and time  Psychiatric:         Behavior: Behavior normal        LABS/IMAGING  Hemoglobin A1C   Date Value Ref Range Status   05/01/2023 7 4 (A) 6 5 Final   10/10/2022 8 4 (H) Normal 3 8-5 6%; PreDiabetic 5 7-6 4%; Diabetic >=6 5%; Glycemic control for adults with diabetes <7 0% % Final   10/05/2018 6 0 (H) <5 7 % Final     Comment:     Reference Range  Non-diabetic                     <5 7  Pre-diabetic                     5 7-6 4  Diabetic                         >=6 5  ADA target for diabetic control  <=7     HDL, Direct   Date Value Ref Range Status   06/24/2022 42 (L) >=50 mg/dL Final     Comment:     Specimen collection should occur prior to Metamizole administration due to the potential for falsley depressed results       Triglycerides   Date Value Ref Range Status   06/24/2022 98 See Comment mg/dL Final     Comment: " Triglyceride:     0-9Y            <75mg/dL     10Y-17Y         <90 mg/dL       >=18Y     Normal          <150 mg/dL     Borderline High 150-199 mg/dL     High            200-499 mg/dL        Very High       >499 mg/dL    Specimen collection should occur prior to N-Acetylcysteine or Metamizole administration due to the potential for falsely depressed results        WBC   Date Value Ref Range Status   01/10/2023 10 91 (H) 4 31 - 10 16 Thousand/uL Final   08/04/2022 7 49 4 31 - 10 16 Thousand/uL Final   07/16/2022 10 70 (H) 4 31 - 10 16 Thousand/uL Final     Hemoglobin   Date Value Ref Range Status   01/10/2023 10 6 (L) 11 5 - 15 4 g/dL Final   08/04/2022 10 8 (L) 11 5 - 15 4 g/dL Final   07/16/2022 12 0 11 5 - 15 4 g/dL Final     Platelets   Date Value Ref Range Status   01/10/2023 286 149 - 390 Thousands/uL Final   08/04/2022 218 149 - 390 Thousands/uL Final   07/16/2022 325 149 - 390 Thousands/uL Final     Potassium   Date Value Ref Range Status   01/10/2023 3 4 (L) 3 5 - 5 3 mmol/L Final   10/10/2022 3 7 3 5 - 5 3 mmol/L Final   08/04/2022 3 7 3 5 - 5 3 mmol/L Final     Chloride   Date Value Ref Range Status   01/10/2023 106 96 - 108 mmol/L Final   10/10/2022 109 (H) 96 - 108 mmol/L Final   08/04/2022 107 96 - 108 mmol/L Final     CO2   Date Value Ref Range Status   01/10/2023 21 21 - 32 mmol/L Final   10/10/2022 23 21 - 32 mmol/L Final   08/04/2022 23 21 - 32 mmol/L Final     BUN   Date Value Ref Range Status   01/10/2023 13 5 - 25 mg/dL Final   10/10/2022 13 5 - 25 mg/dL Final   08/04/2022 10 5 - 25 mg/dL Final     Creatinine   Date Value Ref Range Status   01/10/2023 0 65 0 60 - 1 30 mg/dL Final     Comment:     Standardized to IDMS reference method   10/10/2022 0 95 0 60 - 1 30 mg/dL Final     Comment:     Standardized to IDMS reference method   08/04/2022 1 02 0 60 - 1 30 mg/dL Final     Comment:     Standardized to IDMS reference method     eGFR   Date Value Ref Range Status   01/10/2023 112 ml/min/1 73sq m Final   10/10/2022 76 ml/min/1 73sq m Final   08/04/2022 70 ml/min/1 73sq m Final     Calcium   Date Value Ref Range Status   01/10/2023 9 2 8 3 - 10 1 mg/dL Final   10/10/2022 9 4 8 3 - 10 1 mg/dL Final   08/04/2022 9 0 8 3 - 10 1 mg/dL Final     AST   Date Value Ref Range Status   01/10/2023 <5 (L) 5 - 45 U/L Final     Comment:     Specimen collection should occur prior to Sulfasalazine administration due to the potential for falsely depressed results  08/04/2022 5 5 - 45 U/L Final     Comment:     Specimen collection should occur prior to Sulfasalazine administration due to the potential for falsely depressed results  07/16/2022 9 5 - 45 U/L Final     Comment:     Specimen collection should occur prior to Sulfasalazine administration due to the potential for falsely depressed results  ALT   Date Value Ref Range Status   01/10/2023 11 (L) 12 - 78 U/L Final     Comment:     Specimen collection should occur prior to Sulfasalazine and/or Sulfapyridine administration due to the potential for falsely depressed results  08/04/2022 13 12 - 78 U/L Final     Comment:     Specimen collection should occur prior to Sulfasalazine and/or Sulfapyridine administration due to the potential for falsely depressed results  07/16/2022 19 12 - 78 U/L Final     Comment:     Specimen collection should occur prior to Sulfasalazine and/or Sulfapyridine administration due to the potential for falsely depressed results  Alkaline Phosphatase   Date Value Ref Range Status   01/10/2023 55 46 - 116 U/L Final   08/04/2022 65 46 - 116 U/L Final   07/16/2022 90 46 - 116 U/L Final     Magnesium   Date Value Ref Range Status   10/10/2022 1 9 1 6 - 2 6 mg/dL Final     No results found for: TSH    This note has been dictated using [employer] software  It may contain errors, including improperly dictated words  Please contact physician directly for any questions       Mohamud Gordon MD   PGY-3

## 2023-04-30 NOTE — ASSESSMENT & PLAN NOTE
Lab Results   Component Value Date    HGBA1C 7 4 (A) 05/01/2023   · Congratulated patient on improved A1C (Last A1c prior was 8 1 and then before was 10 1)  · Diabetic foot exam performed 7/13/2022  · UTD on Pneumonia vaccine   · Ophthalmologic evaluation 9/13 with  Ophthalmology diabetic retinopathy  · Statin at 40  · BMP, Micro/Cr ordered    Advised to obtain this blood work post pregnancy  · Continue to hold metformin   · Continue insulin management per endocrinology

## 2023-05-01 ENCOUNTER — OFFICE VISIT (OUTPATIENT)
Dept: FAMILY MEDICINE CLINIC | Facility: CLINIC | Age: 38
End: 2023-05-01

## 2023-05-01 VITALS
DIASTOLIC BLOOD PRESSURE: 70 MMHG | OXYGEN SATURATION: 98 % | TEMPERATURE: 97.9 F | SYSTOLIC BLOOD PRESSURE: 128 MMHG | HEIGHT: 67 IN | RESPIRATION RATE: 18 BRPM | BODY MASS INDEX: 45.99 KG/M2 | WEIGHT: 293 LBS | HEART RATE: 132 BPM

## 2023-05-01 DIAGNOSIS — E11.9 TYPE 2 DIABETES MELLITUS WITHOUT COMPLICATION, WITHOUT LONG-TERM CURRENT USE OF INSULIN (HCC): Primary | ICD-10-CM

## 2023-05-01 DIAGNOSIS — I10 BENIGN ESSENTIAL HTN: ICD-10-CM

## 2023-05-01 PROBLEM — M25.552 LEFT HIP PAIN: Status: RESOLVED | Noted: 2022-08-12 | Resolved: 2023-05-01

## 2023-05-01 LAB — SL AMB POCT HEMOGLOBIN AIC: 7.4 (ref ?–6.5)

## 2023-05-19 ENCOUNTER — TELEPHONE (OUTPATIENT)
Dept: FAMILY MEDICINE CLINIC | Facility: CLINIC | Age: 38
End: 2023-05-19

## 2023-05-19 ENCOUNTER — TRANSITIONAL CARE MANAGEMENT (OUTPATIENT)
Dept: FAMILY MEDICINE CLINIC | Facility: CLINIC | Age: 38
End: 2023-05-19

## 2023-06-14 DIAGNOSIS — E78.2 MIXED HYPERLIPIDEMIA: ICD-10-CM

## 2023-06-14 NOTE — TELEPHONE ENCOUNTER
Please schedule TCM appointment  Verify how patient is feeling and if they are in need of anything before their visit  Please send appt date and patient questions/concerns to [facility] to complete TCM

## 2023-06-16 RX ORDER — ATORVASTATIN CALCIUM 10 MG/1
TABLET, FILM COATED ORAL
Qty: 30 TABLET | Refills: 3 | OUTPATIENT
Start: 2023-06-16

## 2023-07-06 NOTE — PROGRESS NOTES
Name: Adrian Cortez      : 1985      MRN: [id number]  Encounter Provider: Kyle Sheth MD  Encounter Date: 2023   Encounter department: [address] [facility],[address]     1. Type 2 diabetes mellitus without complication, without long-term current use of insulin ([facility])  Assessment & Plan:    Lab Results   Component Value Date    HGBA1C 7.3 (H) 2023   · A1c at 7.3 on 23, improving   · On metformin 500 mg XR daily  · Used insulin only during pregnancy    Plan:  · Continue current regimen  · Appointment in 2 months for DM follow up with foot exam      2. Mixed hyperlipidemia  Assessment & Plan:  · Lipid panel 2022- Cholesterol-222, triglycerides-98, HDL-42, LDL-160  · Continue atorvastatin 10 mg   · Lipid panel     Orders:  -     atorvastatin (LIPITOR) 10 mg tablet; Take 1 tablet (10 mg total) by mouth daily  -     Lipid panel; Future         Subjective      Ruma Gifford is a 44 y.o female with PMH that include HTN, T2DM, asthma and hyperlipidemia who presents today for evaluation of hyperlipidemia    Review of Systems   Constitutional: Negative for chills and fever. HENT: Negative for ear pain and sore throat. Eyes: Negative for pain and visual disturbance. Respiratory: Negative for cough and shortness of breath. Cardiovascular: Negative for chest pain and palpitations. Gastrointestinal: Negative for abdominal pain and vomiting. Genitourinary: Negative for dysuria and hematuria. Skin: Negative for color change and rash. Neurological: Negative for seizures and syncope. All other systems reviewed and are negative.       Current Outpatient Medications on File Prior to Visit   Medication Sig   • labetalol (NORMODYNE) 300 mg tablet Take 300 mg by mouth 2 (two) times a day   • metFORMIN (GLUCOPHAGE-XR) 500 mg 24 hr tablet Take 500 mg by mouth daily with dinner   • albuterol (PROVENTIL HFA,VENTOLIN HFA) 90 mcg/act inhaler Inhale 2 puffs every 6 (six) hours as needed for wheezing or shortness of breath   • ferrous sulfate 324 (65 Fe) mg Take 1 tablet (324 mg total) by mouth every other day   • omeprazole (PriLOSEC) 20 mg delayed release capsule Take 20 mg by mouth daily   • [DISCONTINUED] aspirin (ECOTRIN LOW STRENGTH) 81 mg EC tablet Take 162 mg by mouth daily   • [DISCONTINUED] atorvastatin (LIPITOR) 10 mg tablet TAKE 1 TABLET BY MOUTH EVERY DAY   • [DISCONTINUED] Blood Glucose Monitoring Suppl (OneTouch Verio) w/Device KIT Use 2 (two) times a day Testing sugars 2xs daily   • [DISCONTINUED] carboxymethylcellulose 0.5 % SOLN Administer 2 drops to both eyes 3 (three) times a day as needed for dry eyes   • [DISCONTINUED] Insulin Glargine Solostar (Lantus SoloStar) 100 UNIT/ML SOPN 48 units in the morning 94units hs . Titrate up to total daily dose of 200 unit   • [DISCONTINUED] insulin lispro (HumaLOG) 100 units/mL injection 20 units before breakfast, 20 units before lunch and 34 units before dinner. Titrate up to total daily dose of 100 units   • [DISCONTINUED] metFORMIN (GLUCOPHAGE) 1000 MG tablet Take 1 tablet (1,000 mg total) by mouth 2 (two) times a day with meals   • [DISCONTINUED] OneTouch Delica Lancets 76W MISC Use 2 (two) times a day Testing sugars 2xs daily   • [DISCONTINUED] OneTouch Verio test strip TESTING SUGARS 2XS DAILY       Objective     /90 (BP Location: Right arm, Patient Position: Sitting, Cuff Size: Large)   Pulse 99   Temp 98.7 °F (37.1 °C) (Temporal)   Resp 16   Ht 5' 7" (1.702 m)   Wt 127 kg (279 lb)   LMP  (LMP Unknown)   SpO2 98%   Breastfeeding Yes   BMI 43.70 kg/m²     Physical Exam  Constitutional:       General: She is not in acute distress. Appearance: Normal appearance. She is obese. She is not toxic-appearing. HENT:      Head: Normocephalic and atraumatic. Nose: Nose normal. No congestion or rhinorrhea. Eyes:      General: No scleral icterus.      Conjunctiva/sclera: Conjunctivae normal. Cardiovascular:      Rate and Rhythm: Normal rate and regular rhythm. Pulses: Normal pulses. Heart sounds: Normal heart sounds. No murmur heard. No gallop. Pulmonary:      Effort: Pulmonary effort is normal. No respiratory distress. Breath sounds: Normal breath sounds. No wheezing or rales. Musculoskeletal:      Cervical back: Normal range of motion and neck supple. Right lower leg: No edema. Left lower leg: No edema. Skin:     General: Skin is warm and dry. Capillary Refill: Capillary refill takes less than 2 seconds. Coloration: Skin is not jaundiced or pale. Neurological:      General: No focal deficit present. Mental Status: She is alert and oriented to person, place, and time.        Kayy Guerrero MD

## 2023-07-07 ENCOUNTER — OFFICE VISIT (OUTPATIENT)
Dept: FAMILY MEDICINE CLINIC | Facility: CLINIC | Age: 38
End: 2023-07-07

## 2023-07-07 VITALS
TEMPERATURE: 98.7 F | HEIGHT: 67 IN | OXYGEN SATURATION: 98 % | BODY MASS INDEX: 43.79 KG/M2 | HEART RATE: 99 BPM | WEIGHT: 279 LBS | RESPIRATION RATE: 16 BRPM | SYSTOLIC BLOOD PRESSURE: 140 MMHG | DIASTOLIC BLOOD PRESSURE: 90 MMHG

## 2023-07-07 DIAGNOSIS — E78.2 MIXED HYPERLIPIDEMIA: ICD-10-CM

## 2023-07-07 DIAGNOSIS — E11.9 TYPE 2 DIABETES MELLITUS WITHOUT COMPLICATION, WITHOUT LONG-TERM CURRENT USE OF INSULIN (HCC): ICD-10-CM

## 2023-07-07 RX ORDER — ATORVASTATIN CALCIUM 10 MG/1
10 TABLET, FILM COATED ORAL DAILY
Qty: 60 TABLET | Refills: 1 | Status: SHIPPED | OUTPATIENT
Start: 2023-07-07

## 2023-07-07 RX ORDER — METFORMIN HYDROCHLORIDE 500 MG/1
500 TABLET, EXTENDED RELEASE ORAL
COMMUNITY

## 2023-07-07 RX ORDER — LABETALOL 300 MG/1
300 TABLET, FILM COATED ORAL 2 TIMES DAILY
COMMUNITY

## 2023-07-07 NOTE — ASSESSMENT & PLAN NOTE
Lab Results   Component Value Date    HGBA1C 7.3 (H) 06/04/2023   · A1c at 7.3 on 6/4/23, improving   · On metformin 500 mg XR daily  · Used insulin only during pregnancy    Plan:  · Continue current regimen  · Appointment in 2 months for DM follow up with foot exam

## 2023-09-05 NOTE — ASSESSMENT & PLAN NOTE
· PHQ-9:  · DANTE-7:  · Patient lost mother and sister in the past 2 years  · Reports sleep disturbance, diminished interested in doing things that used to bring her chikis  · Denies feeling guilt or worthless  · Reports low energy, concentration difficulties   · Reports increased appetite abnormality   · Denies pychomotor retardation or agitation  · Denies SI or HI  · Reports having good support at home (kids and partner)  · Works as   · Currently not taking any medication    Plan:  · Start Zoloft 25 mg daily for the first week, titrate to 50 mg daily if good response  · Referral to behavioral health for psychology evaluation  · Follow up in 2 weeks

## 2023-09-05 NOTE — ASSESSMENT & PLAN NOTE
· PHQ-9: 8 (mild depression). Patient agrees with the screening result and feels better. · Patient lost mother and sister 4 years ago 3 months a part. Reports to find it difficult to accept their death, specially her sister's. Patient reports that her sister  from an overdose of Fetanyl. She believes her sister's step daughter gave the drug to her. Patient reports that she is really angry. Patient reports that she is not seeing her to avoid conflict. · Patient reports that she did not like her last therapist, but has scheduled an appointment with another therapist in one week  · Denies SI or HI  · Reports having good support at home (kids and partner)  · Currently on Zoloft 25 mg daily. Reports doing well and does not wish to increase the dose    Plan:  · Continue current regimen and therapy.   · Follow up in 3 months

## 2023-09-05 NOTE — PROGRESS NOTES
Name: Roscoe Bardales      : 1985      MRN: [id number]  Encounter Provider: Miley Encarnacion MD  Encounter Date: 2023   Encounter department: [address] [facility],[address]     1. Depressive disorder  Assessment & Plan:  · PHQ-9: 8 (mild depression). Patient agrees with the screening result and feels better. · Patient lost mother and sister 4 years ago 3 months a part. Reports to find it difficult to accept their death, specially her sister's. Patient reports that her sister  from an overdose of Fetanyl. She believes her sister's step daughter gave the drug to her. Patient reports that she is really angry. Patient reports that she is not seeing her to avoid conflict. · Patient reports that she did not like her last therapist, but has scheduled an appointment with another therapist in one week  · Denies SI or HI  · Reports having good support at home (kids and partner)  · Currently on Zoloft 25 mg daily. Reports doing well and does not wish to increase the dose    Plan:  · Continue current regimen and therapy. · Follow up in 3 months         2. Type 2 diabetes mellitus without complication, without long-term current use of insulin ([facility])  -     metFORMIN (GLUCOPHAGE-XR) 500 mg 24 hr tablet; Take 1 tablet (500 mg total) by mouth daily with dinner         Subjective      [place] Brigitte is a 43 y.o female with PMH of HTN, T2DM, hyperlipidemia, depression, anemia and obesity who presents today for evaluation and management of depression. Review of Systems   Constitutional: Negative for chills and fever. HENT: Negative for ear pain and sore throat. Eyes: Negative for pain and visual disturbance. Respiratory: Negative for cough and shortness of breath. Cardiovascular: Negative for chest pain and palpitations. Gastrointestinal: Negative for abdominal pain and vomiting. Genitourinary: Negative for dysuria and hematuria.    Psychiatric/Behavioral: The patient is nervous/anxious. All other systems reviewed and are negative. Current Outpatient Medications on File Prior to Visit   Medication Sig   • albuterol (PROVENTIL HFA,VENTOLIN HFA) 90 mcg/act inhaler Inhale 2 puffs every 6 (six) hours as needed for wheezing or shortness of breath   • atorvastatin (LIPITOR) 10 mg tablet Take 1 tablet (10 mg total) by mouth daily   • ferrous sulfate 324 (65 Fe) mg Take 1 tablet (324 mg total) by mouth every other day   • labetalol (NORMODYNE) 300 mg tablet Take 300 mg by mouth 2 (two) times a day   • omeprazole (PriLOSEC) 20 mg delayed release capsule Take 20 mg by mouth daily   • [DISCONTINUED] metFORMIN (GLUCOPHAGE-XR) 500 mg 24 hr tablet Take 500 mg by mouth daily with dinner       Objective     /80 (BP Location: Right arm, Patient Position: Sitting, Cuff Size: Large)   Pulse (!) 128   Temp 97.8 °F (36.6 °C) (Temporal)   Resp 16   Ht 5' 7" (1.702 m)   Wt 126 kg (278 lb)   LMP  (LMP Unknown)   SpO2 99%   Breastfeeding Yes   BMI 43.54 kg/m²     Physical Exam  Constitutional:       General: She is not in acute distress. Appearance: Normal appearance. She is obese. She is not toxic-appearing. HENT:      Head: Normocephalic and atraumatic. Nose: Nose normal. No congestion or rhinorrhea. Eyes:      General: No scleral icterus. Conjunctiva/sclera: Conjunctivae normal.   Pulmonary:      Effort: No respiratory distress. Musculoskeletal:      Cervical back: Normal range of motion. Right lower leg: No edema. Left lower leg: No edema. Skin:     General: Skin is warm and dry. Capillary Refill: Capillary refill takes less than 2 seconds. Coloration: Skin is not jaundiced or pale. Neurological:      General: No focal deficit present. Mental Status: She is alert and oriented to person, place, and time. Psychiatric:         Mood and Affect: Mood normal. Affect is angry.          Speech: Speech normal.         Behavior: Behavior normal.         Thought Content:  Thought content normal.       Sameer Hernandez MD

## 2023-09-08 ENCOUNTER — OFFICE VISIT (OUTPATIENT)
Dept: FAMILY MEDICINE CLINIC | Facility: CLINIC | Age: 38
End: 2023-09-08

## 2023-09-08 VITALS
RESPIRATION RATE: 16 BRPM | HEIGHT: 67 IN | SYSTOLIC BLOOD PRESSURE: 130 MMHG | DIASTOLIC BLOOD PRESSURE: 80 MMHG | BODY MASS INDEX: 43.63 KG/M2 | OXYGEN SATURATION: 99 % | WEIGHT: 278 LBS | TEMPERATURE: 97.8 F | HEART RATE: 128 BPM

## 2023-09-08 DIAGNOSIS — E11.9 TYPE 2 DIABETES MELLITUS WITHOUT COMPLICATION, WITHOUT LONG-TERM CURRENT USE OF INSULIN (HCC): ICD-10-CM

## 2023-09-08 DIAGNOSIS — F32.A DEPRESSIVE DISORDER: Primary | ICD-10-CM

## 2023-09-08 PROCEDURE — 99213 OFFICE O/P EST LOW 20 MIN: CPT | Performed by: FAMILY MEDICINE

## 2023-09-08 RX ORDER — METFORMIN HYDROCHLORIDE 500 MG/1
500 TABLET, EXTENDED RELEASE ORAL
Qty: 90 TABLET | Refills: 0 | Status: SHIPPED | OUTPATIENT
Start: 2023-09-08

## 2023-09-19 NOTE — PROGRESS NOTES
[address] [facility] [place]    NAME: Leanne Prieto  AGE: 43 y.o. SEX: female  : 1985     DATE: 2023     Assessment and Plan:     Problem List Items Addressed This Visit    None  Visit Diagnoses     Screening-pulmonary TB    -  Primary    Relevant Orders    TB Skin Test (Completed)          Immunizations and preventive care screenings were discussed with patient today. Appropriate education was printed on patient's after visit summary. Counseling:  Alcohol/drug use: discussed moderation in alcohol intake, the recommendations for healthy alcohol use, and avoidance of illicit drug use. Sexual health: discussed sexually transmitted diseases, partner selection, use of condoms, avoidance of unintended pregnancy, and contraceptive alternatives. · Exercise: the importance of regular exercise/physical activity was discussed. Recommend exercise 3-5 times per week for at least 30 minutes. BMI Counseling: Body mass index is 45.73 kg/m². The BMI is above normal. Nutrition recommendations include decreasing portion sizes, encouraging healthy choices of fruits and vegetables, consuming healthier snacks, limiting drinks that contain sugar and reducing intake of cholesterol. Exercise recommendations include exercising 3-5 times per week. Rationale for BMI follow-up plan is due to patient being overweight or obese. Return for Next scheduled follow up. Chief Complaint:     Chief Complaint   Patient presents with   • Physical Exam      History of Present Illness:     Adult Annual Physical   Patient here for a comprehensive physical exam. The patient reports no problems. Diet and Physical Activity  · Diet/Nutrition: limited junk food, low fat diet, consuming 3-5 servings of fruits/vegetables daily and limited fruits/vegetables. · Exercise: no formal exercise.       Depression Screening  PHQ-2/9 Depression Screening General Health  · Sleep: gets 4-6 hours of sleep on average. · Hearing: normal - bilateral.  · Vision: wears glasses. · Dental: regular dental visits, brushes teeth twice daily and flosses teeth occasionally. /GYN Health  · Last menstrual period: N/A  · Contraceptive method: injectable contraception. · History of STDs?: no.     Review of Systems:     Review of Systems   Constitutional: Negative for chills and fever. HENT: Negative for ear pain and sore throat. Eyes: Negative for pain and visual disturbance. Respiratory: Negative for cough and shortness of breath. Cardiovascular: Negative for chest pain and palpitations. Gastrointestinal: Negative for abdominal pain and vomiting. Genitourinary: Negative for dysuria and hematuria. Musculoskeletal: Negative for arthralgias and back pain. Skin: Negative for color change and rash. Neurological: Negative for seizures and syncope. All other systems reviewed and are negative.      Past Medical History:     Past Medical History:   Diagnosis Date   • Dental abscess 10/23/2020   • Hypokalemia 6/8/2020   • Pseudotumor cerebri       Past Surgical History:     Past Surgical History:   Procedure Laterality Date   • TONSILLECTOMY        Social History:     Social History     Socioeconomic History   • Marital status: Single     Spouse name: None   • Number of children: None   • Years of education: None   • Highest education level: None   Occupational History   • None   Tobacco Use   • Smoking status: Every Day     Packs/day: 0.25     Types: Cigarettes   • Smokeless tobacco: Never   • Tobacco comments:     pt quit 27 days ago   Vaping Use   • Vaping Use: Never used   Substance and Sexual Activity   • Alcohol use: No   • Drug use: No   • Sexual activity: None   Other Topics Concern   • None   Social History Narrative   • None     Social Determinants of Health     Financial Resource Strain: Low Risk  (4/28/2023)    Overall Financial Resource Strain (CARDIA)    • Difficulty of Paying Living Expenses: Not hard at all   Food Insecurity: No Food Insecurity (4/28/2023)    Hunger Vital Sign    • Worried About Running Out of Food in the Last Year: Never true    • Ran Out of Food in the Last Year: Never true   Transportation Needs: No Transportation Needs (4/28/2023)    PRAPARE - Transportation    • Lack of Transportation (Medical): No    • Lack of Transportation (Non-Medical): No   Physical Activity: Not on file   Stress: Not on file   Social Connections: Not on file   Intimate Partner Violence: Not on file   Housing Stability: Not on file      Family History:     History reviewed. No pertinent family history. Current Medications:     Current Outpatient Medications   Medication Sig Dispense Refill   • albuterol (PROVENTIL HFA,VENTOLIN HFA) 90 mcg/act inhaler Inhale 2 puffs every 6 (six) hours as needed for wheezing or shortness of breath 6.7 g 0   • atorvastatin (LIPITOR) 10 mg tablet Take 1 tablet (10 mg total) by mouth daily 60 tablet 1   • ferrous sulfate 324 (65 Fe) mg Take 1 tablet (324 mg total) by mouth every other day 60 tablet 1   • labetalol (NORMODYNE) 300 mg tablet Take 300 mg by mouth 2 (two) times a day     • metFORMIN (GLUCOPHAGE-XR) 500 mg 24 hr tablet Take 1 tablet (500 mg total) by mouth daily with dinner 90 tablet 0   • omeprazole (PriLOSEC) 20 mg delayed release capsule Take 20 mg by mouth daily       No current facility-administered medications for this visit. Allergies:      Allergies   Allergen Reactions   • Bee Venom Anaphylaxis     Anaphylaxis   • Nabumetone Anaphylaxis     Anaphylaxis   • Orange Syrup Anaphylaxis   • Prochlorperazine Anaphylaxis     Anaphylaxis   • Chlorpheniramine    • Codeine    • Diphenhydramine      Other reaction(s): head and neck swelling   • Pseudoephedrine    • Tetanus Toxoid       Physical Exam:     /80   Pulse 84   Temp 97.6 °F (36.4 °C) (Temporal)   Resp 22   Ht 5' 7" (1.702 m)   Wt 132 kg (292 lb)   LMP  (LMP Unknown)   SpO2 99%   Breastfeeding No   BMI 45.73 kg/m²     Physical Exam  Vitals and nursing note reviewed. Constitutional:       General: She is not in acute distress. Appearance: She is well-developed. HENT:      Head: Normocephalic and atraumatic. Eyes:      Conjunctiva/sclera: Conjunctivae normal.   Cardiovascular:      Rate and Rhythm: Normal rate and regular rhythm. Heart sounds: No murmur heard. Pulmonary:      Effort: Pulmonary effort is normal. No respiratory distress. Breath sounds: Normal breath sounds. Abdominal:      Palpations: Abdomen is soft. Tenderness: There is no abdominal tenderness. Musculoskeletal:         General: No swelling. Cervical back: Neck supple. Skin:     General: Skin is warm and dry. Capillary Refill: Capillary refill takes less than 2 seconds. Neurological:      Mental Status: She is alert.    Psychiatric:         Mood and Affect: Mood normal.          [address], [place]   [address] [facility]

## 2023-09-20 ENCOUNTER — OFFICE VISIT (OUTPATIENT)
Dept: FAMILY MEDICINE CLINIC | Facility: CLINIC | Age: 38
End: 2023-09-20

## 2023-09-20 VITALS
RESPIRATION RATE: 22 BRPM | HEART RATE: 84 BPM | TEMPERATURE: 97.6 F | OXYGEN SATURATION: 99 % | WEIGHT: 292 LBS | HEIGHT: 67 IN | DIASTOLIC BLOOD PRESSURE: 80 MMHG | SYSTOLIC BLOOD PRESSURE: 130 MMHG | BODY MASS INDEX: 45.83 KG/M2

## 2023-09-20 DIAGNOSIS — Z11.1 SCREENING-PULMONARY TB: Primary | ICD-10-CM

## 2023-09-20 PROCEDURE — 99395 PREV VISIT EST AGE 18-39: CPT | Performed by: FAMILY MEDICINE

## 2023-09-20 PROCEDURE — 86580 TB INTRADERMAL TEST: CPT | Performed by: FAMILY MEDICINE

## 2023-09-22 ENCOUNTER — CLINICAL SUPPORT (OUTPATIENT)
Dept: FAMILY MEDICINE CLINIC | Facility: CLINIC | Age: 38
End: 2023-09-22

## 2023-09-22 DIAGNOSIS — Z11.1 ENCOUNTER FOR PPD SKIN TEST READING: Primary | ICD-10-CM

## 2023-09-22 LAB
INDURATION: 0 MM
TB SKIN TEST: NEGATIVE

## 2023-10-07 ENCOUNTER — HOSPITAL ENCOUNTER (EMERGENCY)
Facility: HOSPITAL | Age: 38
Discharge: HOME | End: 2023-10-07
Attending: EMERGENCY MEDICINE
Payer: COMMERCIAL

## 2023-10-07 VITALS
HEART RATE: 106 BPM | OXYGEN SATURATION: 99 % | RESPIRATION RATE: 22 BRPM | TEMPERATURE: 98.9 F | DIASTOLIC BLOOD PRESSURE: 79 MMHG | SYSTOLIC BLOOD PRESSURE: 134 MMHG

## 2023-10-07 DIAGNOSIS — R73.9 HYPERGLYCEMIA: ICD-10-CM

## 2023-10-07 DIAGNOSIS — R53.1 WEAKNESS: Primary | ICD-10-CM

## 2023-10-07 LAB
ABO GROUP BLD: NORMAL
ABO GROUP BLD: NORMAL
ALBUMIN SERPL BCP-MCNC: 4.5 G/DL (ref 3.5–5)
ALP SERPL-CCNC: 82 U/L (ref 34–104)
ALT SERPL W P-5'-P-CCNC: 7 U/L (ref 7–52)
ANION GAP SERPL CALCULATED.3IONS-SCNC: 16 MMOL/L
AST SERPL W P-5'-P-CCNC: 10 U/L (ref 13–39)
BACTERIA UR QL AUTO: ABNORMAL /HPF
BASE EX.OXY STD BLDV CALC-SCNC: 83.3 % (ref 60–80)
BASE EXCESS BLDV CALC-SCNC: -6.5 MMOL/L
BASOPHILS # BLD AUTO: 0.08 THOUSANDS/ÂΜL (ref 0–0.1)
BASOPHILS NFR BLD AUTO: 1 % (ref 0–1)
BETA-HYDROXYBUTYRATE: 2.1 MMOL/L
BILIRUB SERPL-MCNC: 0.44 MG/DL (ref 0.2–1)
BILIRUB UR QL STRIP: NEGATIVE
BLD GP AB SCN SERPL QL: NEGATIVE
BUN SERPL-MCNC: 17 MG/DL (ref 5–25)
CALCIUM SERPL-MCNC: 9.7 MG/DL (ref 8.4–10.2)
CHLORIDE SERPL-SCNC: 95 MMOL/L (ref 96–108)
CLARITY UR: CLEAR
CO2 SERPL-SCNC: 18 MMOL/L (ref 21–32)
COLOR UR: COLORLESS
CREAT SERPL-MCNC: 1.26 MG/DL (ref 0.6–1.3)
EOSINOPHIL # BLD AUTO: 0.04 THOUSAND/ÂΜL (ref 0–0.61)
EOSINOPHIL NFR BLD AUTO: 0 % (ref 0–6)
ERYTHROCYTE [DISTWIDTH] IN BLOOD BY AUTOMATED COUNT: 11.5 % (ref 11.6–15.1)
EXT PREGNANCY TEST URINE: NEGATIVE
EXT. CONTROL: NORMAL
GFR SERPL CREATININE-BSD FRML MDRD: 53 ML/MIN/1.73SQ M
GLUCOSE SERPL-MCNC: 440 MG/DL (ref 65–140)
GLUCOSE SERPL-MCNC: 620 MG/DL (ref 65–140)
GLUCOSE SERPL-MCNC: >500 MG/DL (ref 65–140)
GLUCOSE UR STRIP-MCNC: ABNORMAL MG/DL
HCO3 BLDV-SCNC: 16.4 MMOL/L (ref 24–30)
HCT VFR BLD AUTO: 37.7 % (ref 34.8–46.1)
HGB BLD-MCNC: 13 G/DL (ref 11.5–15.4)
HGB UR QL STRIP.AUTO: NEGATIVE
IMM GRANULOCYTES # BLD AUTO: 0.04 THOUSAND/UL (ref 0–0.2)
IMM GRANULOCYTES NFR BLD AUTO: 0 % (ref 0–2)
KETONES UR STRIP-MCNC: ABNORMAL MG/DL
LEUKOCYTE ESTERASE UR QL STRIP: ABNORMAL
LYMPHOCYTES # BLD AUTO: 3.11 THOUSANDS/ÂΜL (ref 0.6–4.47)
LYMPHOCYTES NFR BLD AUTO: 27 % (ref 14–44)
MCH RBC QN AUTO: 28.7 PG (ref 26.8–34.3)
MCHC RBC AUTO-ENTMCNC: 34.5 G/DL (ref 31.4–37.4)
MCV RBC AUTO: 83 FL (ref 82–98)
MONOCYTES # BLD AUTO: 0.54 THOUSAND/ÂΜL (ref 0.17–1.22)
MONOCYTES NFR BLD AUTO: 5 % (ref 4–12)
NEUTROPHILS # BLD AUTO: 7.83 THOUSANDS/ÂΜL (ref 1.85–7.62)
NEUTS SEG NFR BLD AUTO: 67 % (ref 43–75)
NITRITE UR QL STRIP: NEGATIVE
NON-SQ EPI CELLS URNS QL MICRO: ABNORMAL /HPF
NRBC BLD AUTO-RTO: 0 /100 WBCS
O2 CT BLDV-SCNC: 15.8 ML/DL
PCO2 BLDV: 26.1 MM HG (ref 42–50)
PH BLDV: 7.42 [PH] (ref 7.3–7.4)
PH UR STRIP.AUTO: 5 [PH]
PLATELET # BLD AUTO: 346 THOUSANDS/UL (ref 149–390)
PMV BLD AUTO: 12.8 FL (ref 8.9–12.7)
PO2 BLDV: 54.6 MM HG (ref 35–45)
POTASSIUM SERPL-SCNC: 4.3 MMOL/L (ref 3.5–5.3)
PROT SERPL-MCNC: 8.4 G/DL (ref 6.4–8.4)
PROT UR STRIP-MCNC: ABNORMAL MG/DL
RBC # BLD AUTO: 4.53 MILLION/UL (ref 3.81–5.12)
RBC #/AREA URNS AUTO: ABNORMAL /HPF
RH BLD: POSITIVE
RH BLD: POSITIVE
SODIUM SERPL-SCNC: 129 MMOL/L (ref 135–147)
SP GR UR STRIP.AUTO: 1.03 (ref 1–1.03)
SPECIMEN EXPIRATION DATE: NORMAL
TSH SERPL DL<=0.05 MIU/L-ACNC: 1.52 UIU/ML (ref 0.45–4.5)
UROBILINOGEN UR STRIP-ACNC: <2 MG/DL
WBC # BLD AUTO: 11.64 THOUSAND/UL (ref 4.31–10.16)
WBC #/AREA URNS AUTO: ABNORMAL /HPF

## 2023-10-07 PROCEDURE — 96360 HYDRATION IV INFUSION INIT: CPT

## 2023-10-07 PROCEDURE — 84443 ASSAY THYROID STIM HORMONE: CPT | Performed by: EMERGENCY MEDICINE

## 2023-10-07 PROCEDURE — 82805 BLOOD GASES W/O2 SATURATION: CPT | Performed by: EMERGENCY MEDICINE

## 2023-10-07 PROCEDURE — 81025 URINE PREGNANCY TEST: CPT | Performed by: EMERGENCY MEDICINE

## 2023-10-07 PROCEDURE — 99285 EMERGENCY DEPT VISIT HI MDM: CPT

## 2023-10-07 PROCEDURE — 96372 THER/PROPH/DIAG INJ SC/IM: CPT

## 2023-10-07 PROCEDURE — 80053 COMPREHEN METABOLIC PANEL: CPT | Performed by: EMERGENCY MEDICINE

## 2023-10-07 PROCEDURE — 86850 RBC ANTIBODY SCREEN: CPT | Performed by: EMERGENCY MEDICINE

## 2023-10-07 PROCEDURE — 85025 COMPLETE CBC W/AUTO DIFF WBC: CPT | Performed by: EMERGENCY MEDICINE

## 2023-10-07 PROCEDURE — 81001 URINALYSIS AUTO W/SCOPE: CPT | Performed by: EMERGENCY MEDICINE

## 2023-10-07 PROCEDURE — 82010 KETONE BODYS QUAN: CPT | Performed by: EMERGENCY MEDICINE

## 2023-10-07 PROCEDURE — 93005 ELECTROCARDIOGRAM TRACING: CPT

## 2023-10-07 PROCEDURE — 82948 REAGENT STRIP/BLOOD GLUCOSE: CPT

## 2023-10-07 PROCEDURE — 86901 BLOOD TYPING SEROLOGIC RH(D): CPT | Performed by: EMERGENCY MEDICINE

## 2023-10-07 PROCEDURE — 36415 COLL VENOUS BLD VENIPUNCTURE: CPT | Performed by: EMERGENCY MEDICINE

## 2023-10-07 PROCEDURE — 96361 HYDRATE IV INFUSION ADD-ON: CPT

## 2023-10-07 PROCEDURE — 86900 BLOOD TYPING SEROLOGIC ABO: CPT | Performed by: EMERGENCY MEDICINE

## 2023-10-07 PROCEDURE — 99291 CRITICAL CARE FIRST HOUR: CPT | Performed by: EMERGENCY MEDICINE

## 2023-10-07 RX ORDER — INSULIN LISPRO 100 [IU]/ML
30 INJECTION, SOLUTION INTRAVENOUS; SUBCUTANEOUS ONCE
Status: COMPLETED | OUTPATIENT
Start: 2023-10-07 | End: 2023-10-07

## 2023-10-07 RX ADMIN — INSULIN LISPRO 30 UNITS: 100 INJECTION, SOLUTION INTRAVENOUS; SUBCUTANEOUS at 21:07

## 2023-10-07 RX ADMIN — SODIUM CHLORIDE 1000 ML: 0.9 INJECTION, SOLUTION INTRAVENOUS at 18:30

## 2023-10-07 NOTE — ED PROVIDER NOTES
History  Chief Complaint   Patient presents with   • Weakness - Generalized     Pt presents ambulatory with c/o weakness and lightheadedness, frequent urination, increased thirst all week. 59-year-old woman with relevant PMH T2DM, HTN, HLD, tachycardia of unknown source, and iron deficiency anemia presents with increased urinary frequency and thirst for the last few days. She has been peeing multiple times an hour and drinking a lot of water. She has generalized weakness and occasional lightheadedness. She was on insulin in the past but is only on metformin now. LMP was earlier this week. She is on depo injections. She did give birth 3 months ago and required endocrinology for diabetes management. She has a history of tachycardia at rest which is monitored by cardiology. Prior to Admission Medications   Prescriptions Last Dose Informant Patient Reported? Taking? albuterol (PROVENTIL HFA,VENTOLIN HFA) 90 mcg/act inhaler   No No   Sig: Inhale 2 puffs every 6 (six) hours as needed for wheezing or shortness of breath   atorvastatin (LIPITOR) 10 mg tablet   No No   Sig: Take 1 tablet (10 mg total) by mouth daily   ferrous sulfate 324 (65 Fe) mg   No No   Sig: Take 1 tablet (324 mg total) by mouth every other day   labetalol (NORMODYNE) 300 mg tablet   Yes No   Sig: Take 300 mg by mouth 2 (two) times a day   metFORMIN (GLUCOPHAGE-XR) 500 mg 24 hr tablet   No No   Sig: Take 1 tablet (500 mg total) by mouth daily with dinner   omeprazole (PriLOSEC) 20 mg delayed release capsule   Yes No   Sig: Take 20 mg by mouth daily      Facility-Administered Medications: None       Past Medical History:   Diagnosis Date   • Dental abscess 10/23/2020   • Diabetes mellitus ([address] [facility])    • Hypokalemia 06/08/2020   • Pseudotumor cerebri        Past Surgical History:   Procedure Laterality Date   • TONSILLECTOMY         History reviewed. No pertinent family history.   I have reviewed and agree with the history as documented. E-Cigarette/Vaping   • E-Cigarette Use Never User      E-Cigarette/Vaping Substances   • Nicotine No    • THC No    • CBD No    • Flavoring No    • Other No    • Unknown No      Social History     Tobacco Use   • Smoking status: Every Day     Packs/day: 0.25     Types: Cigarettes   • Smokeless tobacco: Never   • Tobacco comments:     pt quit 27 days ago   Vaping Use   • Vaping Use: Never used   Substance Use Topics   • Alcohol use: No   • Drug use: No        Review of Systems   Constitutional: Negative for chills and fever. HENT: Negative for ear pain and sore throat. Eyes: Negative for pain and visual disturbance. Respiratory: Negative for cough, shortness of breath and wheezing. Cardiovascular: Negative for chest pain and palpitations. Gastrointestinal: Negative for abdominal pain, constipation, diarrhea and vomiting. Genitourinary: Positive for frequency. Negative for dysuria, hematuria and vaginal bleeding. Musculoskeletal: Negative for arthralgias and back pain. Skin: Negative for color change and rash. Neurological: Positive for weakness. Negative for seizures, syncope and headaches. Psychiatric/Behavioral: Negative for agitation and confusion. Physical Exam  ED Triage Vitals [10/07/23 1802]   Temperature Pulse Respirations Blood Pressure SpO2   98.9 °F (37.2 °C) (!) 151 20 (!) 154/111 99 %      Temp Source Heart Rate Source Patient Position - Orthostatic VS BP Location FiO2 (%)   Oral Monitor -- -- --      Pain Score       --             Orthostatic Vital Signs  Vitals:    10/07/23 1802 10/07/23 1805 10/07/23 1945 10/07/23 2000   BP: (!) 154/111 (!) 154/111 147/98 134/79   Pulse: (!) 151  (!) 124 (!) 106       Physical Exam  Vitals and nursing note reviewed. Constitutional:       General: She is not in acute distress. Appearance: Normal appearance. She is well-developed. HENT:      Head: Normocephalic and atraumatic.       Right Ear: External ear normal. Left Ear: External ear normal.      Nose: Nose normal. No congestion. Cardiovascular:      Rate and Rhythm: Regular rhythm. Tachycardia present. Pulmonary:      Effort: Pulmonary effort is normal. No respiratory distress. Breath sounds: Normal breath sounds. Abdominal:      Palpations: Abdomen is soft. Tenderness: There is no abdominal tenderness. There is no guarding or rebound. Musculoskeletal:         General: Normal range of motion. Cervical back: Normal range of motion and neck supple. Skin:     General: Skin is warm and dry. Neurological:      Mental Status: She is alert and oriented to person, place, and time. Mental status is at baseline.    Psychiatric:         Mood and Affect: Mood normal.         Behavior: Behavior normal.         ED Medications  Medications   sodium chloride 0.9 % bolus 1,000 mL (0 mL Intravenous Stopped 10/7/23 2147)   insulin lispro (HumaLOG) 100 units/mL subcutaneous injection 30 Units (30 Units Subcutaneous Given 10/7/23 2107)       Diagnostic Studies  Results Reviewed     Procedure Component Value Units Date/Time    Fingerstick Glucose (POCT) [[id number]]  (Abnormal) Collected: 10/07/23 2141    Lab Status: Final result Updated: 10/07/23 2142     POC Glucose 440 mg/dl     TSH, 3rd generation with Free T4 reflex [[id number]]  (Normal) Collected: 10/07/23 1827    Lab Status: Final result Specimen: Blood from Arm, Right Updated: 10/07/23 1927     TSH 3RD GENERATON 1.517 uIU/mL     Comprehensive metabolic panel [[id number]]  (Abnormal) Collected: 10/07/23 1827    Lab Status: Final result Specimen: Blood from Arm, Right Updated: 10/07/23 1916     Sodium 129 mmol/L      Potassium 4.3 mmol/L      Chloride 95 mmol/L      CO2 18 mmol/L      ANION GAP 16 mmol/L      BUN 17 mg/dL      Creatinine 1.26 mg/dL      Glucose 620 mg/dL      Calcium 9.7 mg/dL      AST 10 U/L      ALT 7 U/L      Alkaline Phosphatase 82 U/L      Total Protein 8.4 g/dL      Albumin 4.5 g/dL Total Bilirubin 0.44 mg/dL      eGFR 53 ml/min/1.73sq m     Narrative:      [place] guidelines for Chronic Kidney Disease (CKD):   •  Stage 1 with normal or high GFR (GFR > 90 mL/min/1.73 square meters)  •  Stage 2 Mild CKD (GFR = 60-89 mL/min/1.73 square meters)  •  Stage 3A Moderate CKD (GFR = 45-59 mL/min/1.73 square meters)  •  Stage 3B Moderate CKD (GFR = 30-44 mL/min/1.73 square meters)  •  Stage 4 Severe CKD (GFR = 15-29 mL/min/1.73 square meters)  •  Stage 5 End Stage CKD (GFR <15 mL/min/1.73 square meters)  Note: GFR calculation is accurate only with a steady state creatinine    Urine Microscopic [[id number]]  (Abnormal) Collected: 10/07/23 1829    Lab Status: Final result Specimen: Urine, Clean Catch Updated: 10/07/23 1902     RBC, UA 1-2 /hpf      WBC, UA 2-4 /hpf      Epithelial Cells Occasional /hpf      Bacteria, UA None Seen /hpf     UA (URINE) with reflex to Scope [[id number]]  (Abnormal) Collected: 10/07/23 1829    Lab Status: Final result Specimen: Urine, Clean Catch Updated: 10/07/23 1901     Color, UA Colorless     Clarity, UA Clear     Specific Gravity, UA 1.031     pH, UA 5.0     Leukocytes, UA Elevated glucose may cause decreased leukocyte values.  See urine microscopic for UWBC result     Nitrite, UA Negative     Protein, UA Trace mg/dl      Glucose, UA >=1000 (1%) mg/dl      Ketones, UA 20 (1+) mg/dl      Urobilinogen, UA <2.0 mg/dl      Bilirubin, UA Negative     Occult Blood, UA Negative    Beta Hydroxybutyrate [[id number]]  (Abnormal) Collected: 10/07/23 1827    Lab Status: Final result Specimen: Blood from Arm, Right Updated: 10/07/23 1848     BETA-HYDROXYBUTYRATE 2.1 mmol/L     CBC and differential [[id number]]  (Abnormal) Collected: 10/07/23 1827    Lab Status: Final result Specimen: Blood from Arm, Right Updated: 10/07/23 1839     WBC 11.64 Thousand/uL      RBC 4.53 Million/uL      Hemoglobin 13.0 g/dL      Hematocrit 37.7 %      MCV 83 fL      MCH 28.7 pg MCHC 34.5 g/dL      RDW 11.5 %      MPV 12.8 fL      Platelets 974 Thousands/uL      nRBC 0 /100 WBCs      Neutrophils Relative 67 %      Immat GRANS % 0 %      Lymphocytes Relative 27 %      Monocytes Relative 5 %      Eosinophils Relative 0 %      Basophils Relative 1 %      Neutrophils Absolute 7.83 Thousands/µL      Immature Grans Absolute 0.04 Thousand/uL      Lymphocytes Absolute 3.11 Thousands/µL      Monocytes Absolute 0.54 Thousand/µL      Eosinophils Absolute 0.04 Thousand/µL      Basophils Absolute 0.08 Thousands/µL     Blood gas, venous [[id number]]  (Abnormal) Collected: 10/07/23 1827    Lab Status: Final result Specimen: Blood from Arm, Right Updated: 10/07/23 1838     pH, Ming 7.416     pCO2, Ming 26.1 mm Hg      pO2, Ming 54.6 mm Hg      HCO3, Ming 16.4 mmol/L      Base Excess, Ming -6.5 mmol/L      O2 Content, Ming 15.8 ml/dL      O2 HGB, VENOUS 83.3 %     POCT pregnancy, urine [[id number]]  (Normal) Resulted: 10/07/23 1835    Lab Status: Final result Updated: 10/07/23 1835     EXT Preg Test, Ur Negative     Control Valid    Fingerstick Glucose (POCT) [[id number]]  (Abnormal) Collected: 10/07/23 1816    Lab Status: Final result Updated: 10/07/23 1817     POC Glucose >500 mg/dl                  No orders to display         Procedures  Procedures      ED Course  ED Course as of 10/07/23 2340   Sat Oct 07, 2023   1837 This ECG was interpreted by me. The ECG demonstrates Normal sinus rhythm, normal intervals, normal axis, normal QRS, diffuse ST depression possible rate related                                         Medical Decision Making  Presents with generalized weakness and increased urinary frequency. Initial fingerstick was >500. Given patient's history of T2DM, will investigate for DKA. She is well appearing otherwise without abdominal pain. She is quite tachycardic on arrival to 140-150s. It was sinus tachycardia. She does report a history of tachycardia followed by cardiology.     DDx: DKA, HHS, UTI, dehydration    No evidence of DKA with alkalosis. She does have a mildly elevated BHB, but she does endorse decreased PO intake. She was given 30 u of lispro with improvement of her glucose. Will recommend she follow up with her PCP as she will need to go back on insulin. Patient in agreement with plan and questions were answered. Verbalized understanding of return precautions. Portions or all of this note were generated using voice recognition software. Occasional wrong word or "sound a like" substitutions may have occurred due to the inherent limitations of voice recognition software. Please interpret any errors within the intended context of the whole sentence or idea. Amount and/or Complexity of Data Reviewed  Labs: ordered. Risk  Prescription drug management. Disposition  Final diagnoses:   Weakness   Hyperglycemia     Time reflects when diagnosis was documented in both MDM as applicable and the Disposition within this note     Time User Action Codes Description Comment    10/7/2023  9:42 PM Vadim Art Add [R53.1] Weakness     10/7/2023  9:42 PM Vadim Art Add [R73.9] Hyperglycemia       ED Disposition     ED Disposition   Discharge    Condition   Stable    Date/Time   Sat Oct 7, 2023  9:42 PM    Comment   Hans Muhammad discharge to home/self care.                Follow-up Information    None         Discharge Medication List as of 10/7/2023  9:54 PM      CONTINUE these medications which have NOT CHANGED    Details   albuterol (PROVENTIL HFA,VENTOLIN HFA) 90 mcg/act inhaler Inhale 2 puffs every 6 (six) hours as needed for wheezing or shortness of breath, Starting Thu 4/7/2022, Normal      atorvastatin (LIPITOR) 10 mg tablet Take 1 tablet (10 mg total) by mouth daily, Starting Fri 7/7/2023, Normal      ferrous sulfate 324 (65 Fe) mg Take 1 tablet (324 mg total) by mouth every other day, Starting Wed 4/13/2022, Normal      labetalol (NORMODYNE) 300 mg tablet Take 300 mg by mouth 2 (two) times a day, Historical Med      metFORMIN (GLUCOPHAGE-XR) 500 mg 24 hr tablet Take 1 tablet (500 mg total) by mouth daily with dinner, Starting Fri 9/8/2023, Normal      omeprazole (PriLOSEC) 20 mg delayed release capsule Take 20 mg by mouth daily, Starting Thu 4/13/2023, Until Tue 10/10/2023, Historical Med           No discharge procedures on file. PDMP Review       Value Time User    PDMP Reviewed  Yes 4/30/2020  3:56 PM Rodrigo Greene MD           ED Provider  Attending physically available and evaluated Otilio Willis. I managed the patient along with the ED Attending.     Electronically Signed by         Federico Watkins MD  10/07/23 8985

## 2023-10-08 NOTE — DISCHARGE INSTRUCTIONS
You were seen for increased urinary frequency and water intake. Your sugar was very high but you were not in diabetic ketoacidosis. Please follow up with your primary care doctor on Monday for insulin and diabetes management.

## 2023-10-09 LAB
ATRIAL RATE: 140 BPM
P AXIS: 89 DEGREES
PR INTERVAL: 128 MS
QRS AXIS: 68 DEGREES
QRSD INTERVAL: 96 MS
QT INTERVAL: 270 MS
QTC INTERVAL: 412 MS
T WAVE AXIS: -41 DEGREES
VENTRICULAR RATE: 140 BPM

## 2023-10-09 PROCEDURE — 93010 ELECTROCARDIOGRAM REPORT: CPT | Performed by: INTERNAL MEDICINE

## 2023-10-13 ENCOUNTER — APPOINTMENT (EMERGENCY)
Dept: RADIOLOGY | Facility: HOSPITAL | Age: 38
End: 2023-10-13
Payer: COMMERCIAL

## 2023-10-13 ENCOUNTER — HOSPITAL ENCOUNTER (EMERGENCY)
Facility: HOSPITAL | Age: 38
Discharge: HOME | End: 2023-10-13
Attending: EMERGENCY MEDICINE
Payer: COMMERCIAL

## 2023-10-13 VITALS
DIASTOLIC BLOOD PRESSURE: 80 MMHG | SYSTOLIC BLOOD PRESSURE: 141 MMHG | HEART RATE: 140 BPM | OXYGEN SATURATION: 97 % | RESPIRATION RATE: 16 BRPM | TEMPERATURE: 97.9 F

## 2023-10-13 DIAGNOSIS — M79.5 FOREIGN BODY (FB) IN SOFT TISSUE: Primary | ICD-10-CM

## 2023-10-13 PROCEDURE — 73060 X-RAY EXAM OF HUMERUS: CPT

## 2023-10-13 PROCEDURE — 99284 EMERGENCY DEPT VISIT MOD MDM: CPT | Performed by: EMERGENCY MEDICINE

## 2023-10-13 NOTE — ED ATTENDING ATTESTATION
10/7/2023  I, Ally Wilson DO, saw and evaluated the patient. I have discussed the patient with the resident/non-physician practitioner and agree with the resident's/non-physician practitioner's findings, Plan of Care, and MDM as documented in the resident's/non-physician practitioner's note, except where noted. All available labs and Radiology studies were reviewed. I was present for key portions of any procedure(s) performed by the resident/non-physician practitioner and I was immediately available to provide assistance. At this point I agree with the current assessment done in the Emergency Department. I have conducted an independent evaluation of this patient a history and physical is as follows:43year-old female presents emergency department with uncontrolled diabetes with noted significant hyperglycemia. The patient has no evidence of acidosis on labs at this point time differential diagnoses include DKA, HHNK, but not psych hyperglycemia noted with noncompliance of her medications at this point time. IV fluids was given with several liters as well as IV insulin with improvement of symptoms. Joaquín with the patient plan for outpatient management blood sugar came down significantly.     ED Course         Critical Care Time  CriticalCare Time    Date/Time: 10/13/2023 11:56 AM    Performed by: Ally Wilson DO  Authorized by: Ally Wilson DO    Critical care provider statement:     Critical care time (minutes):  60    Critical care time was exclusive of:  Separately billable procedures and treating other patients and teaching time    Critical care was necessary to treat or prevent imminent or life-threatening deterioration of the following conditions:  Endocrine crisis    Critical care was time spent personally by me on the following activities:  Blood draw for specimens, obtaining history from patient or surrogate, development of treatment plan with patient or surrogate, evaluation of patient's response to treatment, examination of patient, ordering and performing treatments and interventions, ordering and review of laboratory studies, ordering and review of radiographic studies, re-evaluation of patient's condition and review of old charts  Comments:      Upon my evaluation, this patient has a high probability of imminent or life-threatening deterioration due to hyperglycemia  which required my direct attention, intervention, and personal management. I have personally provided 60 minutes of critical care time, exclusive of procedures, teaching, and any prior time recorded by providers other than myself. Time includes review of laboratory data, radiology results, discussion with consultants, and monitoring for potential decompensation.

## 2023-10-13 NOTE — ED PROVIDER NOTES
History  Chief Complaint   Patient presents with    Foreign Body in Skin     Possible needle from dexcom in right upper arm     39-year-old woman with relevant past medical history of diabetes presents with possible foreign body. Patient was removing her Dexcom earlier when she did not find the catheter. She thought it may be embedded in her right arm. She is denying any bleeding or drainage. Prior to Admission Medications   Prescriptions Last Dose Informant Patient Reported? Taking? albuterol (PROVENTIL HFA,VENTOLIN HFA) 90 mcg/act inhaler   No No   Sig: Inhale 2 puffs every 6 (six) hours as needed for wheezing or shortness of breath   atorvastatin (LIPITOR) 10 mg tablet   No No   Sig: Take 1 tablet (10 mg total) by mouth daily   ferrous sulfate 324 (65 Fe) mg   No No   Sig: Take 1 tablet (324 mg total) by mouth every other day   labetalol (NORMODYNE) 300 mg tablet   Yes No   Sig: Take 300 mg by mouth 2 (two) times a day   metFORMIN (GLUCOPHAGE-XR) 500 mg 24 hr tablet   No No   Sig: Take 1 tablet (500 mg total) by mouth daily with dinner      Facility-Administered Medications: None       Past Medical History:   Diagnosis Date    Dental abscess 10/23/2020    Diabetes mellitus ([address])     Hypokalemia 06/08/2020    Pseudotumor cerebri        Past Surgical History:   Procedure Laterality Date    TONSILLECTOMY         History reviewed. No pertinent family history. I have reviewed and agree with the history as documented.     E-Cigarette/Vaping    E-Cigarette Use Never User      E-Cigarette/Vaping Substances    Nicotine No     THC No     CBD No     Flavoring No     Other No     Unknown No      Social History     Tobacco Use    Smoking status: Every Day     Packs/day: 0.25     Types: Cigarettes    Smokeless tobacco: Never    Tobacco comments:     pt quit 27 days ago   Vaping Use    Vaping Use: Never used   Substance Use Topics    Alcohol use: No    Drug use: No        Review of Systems   Constitutional: Negative for chills and fever. HENT:  Negative for ear pain and sore throat. Eyes:  Negative for pain and visual disturbance. Respiratory:  Negative for cough, shortness of breath and wheezing. Cardiovascular:  Negative for chest pain and palpitations. Gastrointestinal:  Negative for abdominal pain, constipation, diarrhea and vomiting. Genitourinary:  Negative for dysuria, frequency, hematuria and vaginal bleeding. Musculoskeletal:  Negative for arthralgias and back pain. Skin:  Negative for color change and rash. Neurological:  Negative for seizures, syncope and headaches. Psychiatric/Behavioral:  Negative for agitation and confusion. Physical Exam  ED Triage Vitals [10/13/23 1230]   Temperature Pulse Respirations Blood Pressure SpO2   97.9 °F (36.6 °C) (!) 140 16 141/80 97 %      Temp src Heart Rate Source Patient Position - Orthostatic VS BP Location FiO2 (%)   -- -- -- -- --      Pain Score       3             Orthostatic Vital Signs  Vitals:    10/13/23 1230   BP: 141/80   Pulse: (!) 140       Physical Exam  Vitals and nursing note reviewed. Constitutional:       General: She is not in acute distress. Appearance: Normal appearance. She is well-developed. HENT:      Head: Normocephalic and atraumatic. Right Ear: External ear normal.      Left Ear: External ear normal.   Cardiovascular:      Rate and Rhythm: Normal rate and regular rhythm. Pulmonary:      Effort: Pulmonary effort is normal. No respiratory distress. Musculoskeletal:         General: Normal range of motion. Arms:       Cervical back: Normal range of motion and neck supple. Skin:     General: Skin is warm and dry. Neurological:      Mental Status: She is alert and oriented to person, place, and time. Mental status is at baseline.    Psychiatric:         Mood and Affect: Mood normal.         Behavior: Behavior normal.         ED Medications  Medications - No data to display    Diagnostic Studies  Results Reviewed       None                   XR humerus RIGHT   ED Interpretation by Denise Vera MD (10/13 1352)   No obvious embedded foreign bodies      Final Result by Rodolfo Avelar MD (10/13 135)      No acute osseous abnormality. No evidence for opaque foreign body. Workstation performed: [id number]               Procedures  Procedures      ED Course                             SBIRT 20yo+      Flowsheet Row Most Recent Value   Initial Alcohol Screen: US AUDIT-C     1. How often do you have a drink containing alcohol? 0 Filed at: 10/13/2023 1240   2. How many drinks containing alcohol do you have on a typical day you are drinking? 0 Filed at: 10/13/2023 1240   3a. Male UNDER 65: How often do you have five or more drinks on one occasion? 0 Filed at: 10/13/2023 1240   3b. FEMALE Any Age, or MALE 65+: How often do you have 4 or more drinks on one occassion? 0 Filed at: 10/13/2023 1240   Audit-C Score 0 Filed at: 10/13/2023 1240   CLARIBEL: How many times in the past year have you. .. Used an illegal drug or used a prescription medication for non-medical reasons? Never Filed at: 10/13/2023 1240                  Medical Decision Making  Presents with possible retained foreign body from Dexcom. Radiograph does not show any obvious retained foreign body, specifically radioopaque. Will recommend observation at this time and follow-up with surgery if continued issues. Patient in agreement with plan and questions were answered. Verbalized understanding of return precautions. Portions or all of this note were generated using voice recognition software. Occasional wrong word or "sound a like" substitutions may have occurred due to the inherent limitations of voice recognition software. Please interpret any errors within the intended context of the whole sentence or idea.         Amount and/or Complexity of Data Reviewed  Radiology: ordered and independent interpretation performed. Disposition  Final diagnoses:   Foreign body (FB) in soft tissue     Time reflects when diagnosis was documented in both MDM as applicable and the Disposition within this note       Time User Action Codes Description Comment    10/13/2023  1:55 PM Yanelis Arguelles Add [M79.5] Foreign body (FB) in soft tissue           ED Disposition       ED Disposition   Discharge    Condition   Stable    Date/Time   Fri Oct 13, 2023 [address] discharge to home/self care. Follow-up Information       Follow up With Specialties Details Why Contact Info Additional [address] General Surgery Schedule an appointment as soon as possible for a visit in 1 week  [address] [facility] [id number]  [address] [place] [address], [address] [place], [place], [address] [facility]            Discharge Medication List as of 10/13/2023  1:57 PM        CONTINUE these medications which have NOT CHANGED    Details   albuterol (PROVENTIL HFA,VENTOLIN HFA) 90 mcg/act inhaler Inhale 2 puffs every 6 (six) hours as needed for wheezing or shortness of breath, Starting Thu 4/7/2022, Normal      atorvastatin (LIPITOR) 10 mg tablet Take 1 tablet (10 mg total) by mouth daily, Starting Fri 7/7/2023, Normal      ferrous sulfate 324 (65 Fe) mg Take 1 tablet (324 mg total) by mouth every other day, Starting Wed 4/13/2022, Normal      labetalol (NORMODYNE) 300 mg tablet Take 300 mg by mouth 2 (two) times a day, Historical Med      metFORMIN (GLUCOPHAGE-XR) 500 mg 24 hr tablet Take 1 tablet (500 mg total) by mouth daily with dinner, Starting Fri 9/8/2023, Normal           No discharge procedures on file. PDMP Review         Value Time User    PDMP Reviewed  Yes 4/30/2020  3:56 PM Suzie Gan MD             ED Provider  Attending physically available and evaluated Duke Yap.  I managed the patient along with the ED Attending.     Electronically Signed by           Dorian Wall MD  10/13/23 3080

## 2023-10-13 NOTE — ED ATTENDING ATTESTATION
10/13/2023  IEm DO, saw and evaluated the patient. I have discussed the patient with the resident/non-physician practitioner and agree with the resident's/non-physician practitioner's findings, Plan of Care, and MDM as documented in the resident's/non-physician practitioner's note, except where noted. All available labs and Radiology studies were reviewed. I was present for key portions of any procedure(s) performed by the resident/non-physician practitioner and I was immediately available to provide assistance. At this point I agree with the current assessment done in the Emergency Department. I have conducted an independent evaluation of this patient a history and physical is as follows:    Patient is a 42-year-old female with a history of diabetes, pseudotumor cerebri, says that she previously had a Dexcom G6 continuous glucose monitor which she would apply the sensors in her abdomen, was recently switched to Dexcom G7 and today applied the sensor for the first time to her right upper arm and the back area. She said when she applied it, she went to sync it with her phone and was given an error message saying the sensor was not functioning, she removed the sensor, but felt some irritation of the skin there and was concerned there may be some retained foreign body. She called her endocrinologist was advised to go to the ED. She has no pain, no numbness, no tingling, no fever, no chills, otherwise feels okay. General:  Patient is well-appearing  Head:  Atraumatic  Eyes:  Conjunctiva pink  ENT:  Mucous membranes are moist  Neck:  Supple  Extremities: On the back of the patient's right upper arm, overlying the tricep area is a small circular pressure [place] about 3 cm in diameter where the sensor has been applied.   In the center part of this there is a slight amount of tenderness but there is no palpable foreign body, no warmth or redness, no raised component or sloughing of the skin.  Neurologic:  Awake, fluent speech, normal comprehension, AAOx3  Skin:  Pink warm and dry  Psychiatric:  Alert, pleasant, cooperative      ED Course     XR humerus RIGHT   ED Interpretation   No obvious embedded foreign bodies      Final Result      No acute osseous abnormality. No evidence for opaque foreign body. Workstation performed: [id number]             On reassessment patient feeling comfortable. At this point the cause the patient's symptoms is unclear. There is no signs of clear radiopaque foreign body. She may have some slight irritation from the sensor itself. I do not believe will be appropriate to begin surgical wound exploration. No signs of cellulitis or abscess. Believe discharge home with outpatient surgical referral if she continues to have the irritation and foreign body sensation is appropriate and patient is comfortable with this plan. Supportive care, importance of follow-up and return precautions were discussed with the patient, who expressed understanding. The patient was evaluated for sepsis in the emergency department. After that assessment, at the time of admission, no sepsis, severe sepsis, or septic shock was found. DIAGNOSIS:  Acute right upper arm irritation and foreign body sensation, history of diabetes    MEDICAL DECISION MAKING CODING      Chronic conditions affecting care: As per HPI    COLLECTION AND INTERPRETATION OF DATA  I reviewed prior external notes, including endocrinology office visit from October 11, 2023    I ordered each unique test  Tests reviewed personally by me:  Imaging: I independently reviewed the right humerus x-ray and found no radiopaque foreign body. RISK  All of the patient's current prescription medications should be continued.     Treatment:    Surgery  -I considered surgery may be necessary prior to completion of the work up but afterwards there is no indication for immediate surgery  Critical Care Time  Procedures

## 2023-10-13 NOTE — DISCHARGE INSTRUCTIONS
We did not find evidence of any retained foreign body. Follow up with the surgery office in a week if you continued to have pain and concerns for a retained foreign body.

## 2023-10-19 DIAGNOSIS — Z78.9 NEED FOR FOLLOW-UP BY SOCIAL WORKER: Primary | ICD-10-CM

## 2023-10-21 DIAGNOSIS — E78.2 MIXED HYPERLIPIDEMIA: ICD-10-CM

## 2023-10-24 RX ORDER — ATORVASTATIN CALCIUM 10 MG/1
10 TABLET, FILM COATED ORAL DAILY
Qty: 30 TABLET | Refills: 3 | Status: SHIPPED | OUTPATIENT
Start: 2023-10-24

## 2023-11-29 ENCOUNTER — VBI (OUTPATIENT)
Dept: ADMINISTRATIVE | Facility: OTHER | Age: 38
End: 2023-11-29

## 2023-12-02 PROBLEM — Z79.4 TYPE 2 DIABETES MELLITUS, WITH LONG-TERM CURRENT USE OF INSULIN (HCC): Status: ACTIVE | Noted: 2020-06-08

## 2023-12-02 NOTE — PROGRESS NOTES
Name: Angelito Novak      : 1985      MRN: [id number]  Encounter Provider: [address], [place]  Encounter Date: 2023   Encounter department: [facility]     1. Type 1 diabetes mellitus without complication (HCC)  Assessment & Plan:    Lab Results   Component Value Date    HGBA1C 12.3 (H) 10/11/2023   Late onset  Last A1c at 12.3 on 10/11/23  Currently following with Endocrinology  Current regimen: Lantus 40 units daily Novolog 10 units with meals three times a day. Patient has a Dexcom to monitor glucemia at home. Average readin-150 (always in the green zone)  Foot exam done today (23)  Patient is on lisinopril and atorvastatin      Plan:  Patient was educated about the importance of compliance with the medication to avoid complications and fast progression of the disease   Continue management as per Endocrinology  Encouraged to continue lifestyle modifications   Referral to podiatry     Orders:  -     Ambulatory Referral to Podiatry; Future           Subjective      Danisha Mcmullen is a 43 y.o female with PMH of HTN, T2DM, hyperlipidemia, depression, anemia and obesity who presents today for evaluation of T2DM. Patient reports to feel well today. Review of Systems   Constitutional:  Negative for chills and fever. HENT:  Negative for ear pain and sore throat. Eyes:  Negative for pain and visual disturbance. Respiratory:  Negative for cough and shortness of breath. Cardiovascular:  Negative for chest pain and palpitations. Gastrointestinal:  Negative for abdominal pain and vomiting. Genitourinary:  Negative for dysuria and hematuria. Musculoskeletal:  Negative for arthralgias and back pain. Skin:  Negative for color change and rash. Neurological:  Negative for seizures and syncope. All other systems reviewed and are negative.       Current Outpatient Medications on File Prior to Visit   Medication Sig    lisinopril (ZESTRIL) 5 mg tablet Take 5 mg by mouth daily    albuterol (PROVENTIL HFA,VENTOLIN HFA) 90 mcg/act inhaler Inhale 2 puffs every 6 (six) hours as needed for wheezing or shortness of breath    atorvastatin (LIPITOR) 10 mg tablet TAKE 1 TABLET BY MOUTH EVERY DAY    ferrous sulfate 324 (65 Fe) mg Take 1 tablet (324 mg total) by mouth every other day    labetalol (NORMODYNE) 300 mg tablet Take 300 mg by mouth 2 (two) times a day    [DISCONTINUED] metFORMIN (GLUCOPHAGE-XR) 500 mg 24 hr tablet Take 1 tablet (500 mg total) by mouth daily with dinner       Objective     /90 (BP Location: Right arm, Patient Position: Sitting, Cuff Size: Large)   Pulse (!) 119   Temp 98.7 °F (37.1 °C) (Temporal)   Resp 16   Ht 5' 7" (1.702 m)   Wt 129 kg (284 lb)   LMP  (LMP Unknown)   SpO2 98%   Breastfeeding No   BMI 44.48 kg/m²     Physical Exam  Constitutional:       General: She is not in acute distress. Appearance: Normal appearance. She is obese. She is not toxic-appearing. HENT:      Head: Normocephalic and atraumatic. Nose: Nose normal. No congestion or rhinorrhea. Mouth/Throat:      Mouth: Mucous membranes are moist.      Pharynx: Oropharynx is clear. No oropharyngeal exudate or posterior oropharyngeal erythema. Eyes:      General: No scleral icterus. Conjunctiva/sclera: Conjunctivae normal.   Cardiovascular:      Rate and Rhythm: Normal rate and regular rhythm. Pulses: Normal pulses. no weak pulses          Dorsalis pedis pulses are 2+ on the right side and 2+ on the left side. Heart sounds: Normal heart sounds. No murmur heard. No gallop. Pulmonary:      Effort: Pulmonary effort is normal. No respiratory distress. Breath sounds: Normal breath sounds. No wheezing or rales. Musculoskeletal:      Cervical back: Normal range of motion and neck supple. Right lower leg: No edema. Left lower leg: No edema.    Feet:      Right foot:      Skin integrity: No ulcer, skin breakdown, erythema, warmth, callus or dry skin. Left foot:      Skin integrity: No ulcer, skin breakdown, erythema, warmth, callus or dry skin. Skin:     General: Skin is warm and dry. Capillary Refill: Capillary refill takes less than 2 seconds. Coloration: Skin is not jaundiced or pale. Comments: Thickened toe nails    Neurological:      General: No focal deficit present. Mental Status: She is alert and oriented to person, place, and time. Psychiatric:         Mood and Affect: Mood normal.         Behavior: Behavior normal.     Diabetic Foot Exam    Patient's shoes and socks removed. Right Foot/Ankle   Right Foot Inspection  Skin Exam: skin normal. Skin not intact, no dry skin, no warmth, no callus, no erythema, no maceration, no abnormal color, no pre-ulcer, no ulcer and no callus. Toe Exam: ROM and strength within normal limits. No swelling and no tenderness    Sensory   Proprioception: intact  Monofilament testing: diminished    Vascular  Capillary refills: < 3 seconds  The right DP pulse is 2+. Left Foot/Ankle  Left Foot Inspection  Skin Exam: skin normal. Skin not intact, no dry skin, no warmth, no erythema, no maceration, normal color, no pre-ulcer, no ulcer and no callus. Toe Exam: ROM and strength within normal limits. No swelling and no tenderness. Sensory   Proprioception: intact  Monofilament testing: diminished    Vascular  Capillary refills: < 3 seconds  The left DP pulse is 2+.      Assign Risk Category  No deformity present  No loss of protective sensation  No weak pulses  Risk: 0    Markus Garcia MD

## 2023-12-03 NOTE — ASSESSMENT & PLAN NOTE
Lab Results   Component Value Date    HGBA1C 12.3 (H) 10/11/2023   Late onset  Last A1c at 12.3 on 10/11/23  Currently following with Endocrinology  Current regimen: Lantus 40 units daily Novolog 10 units with meals three times a day. Patient has a Dexcom to monitor glucemia at home.  Average readin-150 (always in the green zone)  Foot exam done today (23)  Patient is on lisinopril and atorvastatin      Plan:  Patient was educated about the importance of compliance with the medication to avoid complications and fast progression of the disease   Continue management as per Endocrinology  Encouraged to continue lifestyle modifications   Referral to podiatry

## 2023-12-06 PROBLEM — E10.9 TYPE 1 DIABETES MELLITUS WITHOUT COMPLICATION (HCC): Status: ACTIVE | Noted: 2020-06-08

## 2023-12-08 ENCOUNTER — OFFICE VISIT (OUTPATIENT)
Dept: FAMILY MEDICINE CLINIC | Facility: CLINIC | Age: 38
End: 2023-12-08

## 2023-12-08 VITALS
HEIGHT: 67 IN | TEMPERATURE: 98.7 F | DIASTOLIC BLOOD PRESSURE: 90 MMHG | RESPIRATION RATE: 16 BRPM | SYSTOLIC BLOOD PRESSURE: 140 MMHG | BODY MASS INDEX: 44.57 KG/M2 | OXYGEN SATURATION: 98 % | WEIGHT: 284 LBS | HEART RATE: 119 BPM

## 2023-12-08 DIAGNOSIS — E10.9 TYPE 1 DIABETES MELLITUS WITHOUT COMPLICATION (HCC): Primary | ICD-10-CM

## 2023-12-08 PROCEDURE — 99213 OFFICE O/P EST LOW 20 MIN: CPT | Performed by: FAMILY MEDICINE

## 2023-12-08 PROCEDURE — 3077F SYST BP >= 140 MM HG: CPT | Performed by: FAMILY MEDICINE

## 2023-12-08 PROCEDURE — 3080F DIAST BP >= 90 MM HG: CPT | Performed by: FAMILY MEDICINE

## 2023-12-08 RX ORDER — LISINOPRIL 5 MG/1
5 TABLET ORAL DAILY
COMMUNITY

## 2024-01-03 ENCOUNTER — PATIENT OUTREACH (OUTPATIENT)
Dept: FAMILY MEDICINE CLINIC | Facility: CLINIC | Age: 39
End: 2024-01-03

## 2024-01-03 NOTE — PROGRESS NOTES
"Chart review indicates that an order was placed on 10/19 form Radha Krishnamurthy with [facility] for \"social work referral\" no other details provided. Review indicates that [facility] follows patient with remote visits. She follow with [facility]. MH diagnosis noted. [facility]. No social work needs notes. SWCM called Radha Krishnamurthy for details of referral placed prior to reaching out to pt, VM left. SWCM to follow.   "

## 2024-01-04 ENCOUNTER — OFFICE VISIT (OUTPATIENT)
Dept: FAMILY MEDICINE CLINIC | Facility: CLINIC | Age: 39
End: 2024-01-04

## 2024-01-04 VITALS
BODY MASS INDEX: 45.36 KG/M2 | RESPIRATION RATE: 16 BRPM | WEIGHT: 289 LBS | DIASTOLIC BLOOD PRESSURE: 90 MMHG | OXYGEN SATURATION: 99 % | TEMPERATURE: 98 F | HEART RATE: 100 BPM | HEIGHT: 67 IN | SYSTOLIC BLOOD PRESSURE: 160 MMHG

## 2024-01-04 DIAGNOSIS — I10 BENIGN ESSENTIAL HTN: Primary | ICD-10-CM

## 2024-01-04 DIAGNOSIS — Z02.89 ENCOUNTER FOR COMPLETION OF FORM WITH PATIENT: ICD-10-CM

## 2024-01-04 PROCEDURE — 99214 OFFICE O/P EST MOD 30 MIN: CPT | Performed by: FAMILY MEDICINE

## 2024-01-04 PROCEDURE — 3077F SYST BP >= 140 MM HG: CPT | Performed by: FAMILY MEDICINE

## 2024-01-04 PROCEDURE — 3080F DIAST BP >= 90 MM HG: CPT | Performed by: FAMILY MEDICINE

## 2024-01-04 RX ORDER — LABETALOL 200 MG/1
400 TABLET, FILM COATED ORAL 2 TIMES DAILY
Qty: 120 TABLET | Refills: 3 | Status: SHIPPED | OUTPATIENT
Start: 2024-01-04

## 2024-01-04 RX ORDER — BLOOD PRESSURE TEST KIT
KIT MISCELLANEOUS DAILY
Qty: 1 EACH | Refills: 0 | Status: SHIPPED | OUTPATIENT
Start: 2024-01-04

## 2024-01-04 RX ORDER — ENALAPRIL MALEATE 10 MG/1
10 TABLET ORAL DAILY
Qty: 30 TABLET | Refills: 5 | Status: SHIPPED | OUTPATIENT
Start: 2024-01-04

## 2024-01-04 NOTE — PROGRESS NOTES
A/P:  Hypertension: Repeat /90. No SE. On labetalol 400 mg BID and enalapril 5 mg daily. Increase enalapril to 10 mg daily. Tobacco cessation d/w patient. No alcohol use. BP monitor, to email readings in 1 weeks. TLC d/w pt.  Forms: DOT forms completed.    F/U with primary physician in 2 months.    HPI    38 year old woman with hypertension. On labetalol 400 mg BID and enalapril 5 mg daily. No HA/edema/CP/SOB/carpio/lightheadedness. No BP log. Smokes 1/2 ppd. Poor sleep due to baby at home. No alcohol. Has had a lot of stress in her life due to family deaths. 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    ROS per HPI    Vitals:    01/04/24 0932   BP: 160/90   Pulse: 100   Resp: 16   Temp: 98 °F (36.7 °C)   SpO2: 99%     GNRL: WN, WD woman, NAD  COR: RRR, no m/g/c/r  LUNGS: CTA b/l  EXT: no edema

## 2024-01-08 ENCOUNTER — PATIENT OUTREACH (OUTPATIENT)
Dept: FAMILY MEDICINE CLINIC | Facility: CLINIC | Age: 39
End: 2024-01-08

## 2024-01-08 NOTE — PROGRESS NOTES
"SWCM referral received on 10/19 from Radha Krishnamurthy with [facility] for \"social work referral\". It is noted [facility] follows pt with remote visits. At OV at [facility] no SW needs noted. I contacted Radha Krishnamurthy today @ [phone] and left a message to please return SWCM call. SWCM will await response prior to contacting pt. If no return is received, SW will contact pt directly to access.   "

## 2024-01-09 ENCOUNTER — PATIENT OUTREACH (OUTPATIENT)
Dept: FAMILY MEDICINE CLINIC | Facility: CLINIC | Age: 39
End: 2024-01-09

## 2024-01-09 NOTE — PROGRESS NOTES
IB message received from KACEY Chandler that she has also tried to reach Radha Krishnamurthy with [facility] regarding need for social work referral and no connection was made. Review indicates that  [facility] follows patient with remote visits. She follow with  OBGYN. MH diagnosis noted. At OV on 12/8 pt at [facility] for diabetic follow up. No social work needs notes. [facility] called Radha Krishnamurthy for details of referral placed prior to reaching out to pt, VM left. Further chart review does indicate pt had positive PHQ-9 of 8 at OV on 9/8/23, pt lost her mother and sister 4 years ago, has difficulty accepting their death. Pt switching OP MH agencies. No SI or HI. Has good supports at home of kids and partner. Patient prescribed Zoloft 25 mg. [facility] outreached pt today to discuss above, pt answered and [facility] introduced self and reason for call. Pt was unable to have conversation bc she was at another appt, but she does want to talk. She will return phone call to [facility]. [facility] to follow.

## 2024-01-19 ENCOUNTER — PATIENT OUTREACH (OUTPATIENT)
Dept: FAMILY MEDICINE CLINIC | Facility: CLINIC | Age: 39
End: 2024-01-19

## 2024-01-19 NOTE — PROGRESS NOTES
Per chart review this seems that KACEY MEDLEY Jojo Diogo outreached Pt. Noted Pt was unable to have conversation bc she was at another appt, but she does want to talk. After chart review KACEY MEDLEY did place a call to Pt but she did not  the phone, VM left. KACEY MEDLEY will send Unable to Reach Letter through [employer] and also by mail.    KACEY MEDLEY is closing case today due to unable to contact Pt.  KACEY MEDLEY is remain available for further assistance as needed.

## 2024-01-19 NOTE — LETTER
[address] [place] [address], [address]  [facility] [address] [phone]    Re: Care coordination   1/19/2024       Dear Caridad,    We tried to reach you by phone on 1/19/24 and was unfortunately unable to reach you.  It is important that you contact the [facility] as soon as possible at: [phone].    Sincerely,         Rafaelina Reyes

## 2024-03-18 ENCOUNTER — OFFICE VISIT (OUTPATIENT)
Dept: FAMILY MEDICINE CLINIC | Facility: CLINIC | Age: 39
End: 2024-03-18

## 2024-03-18 VITALS
DIASTOLIC BLOOD PRESSURE: 88 MMHG | TEMPERATURE: 98.6 F | HEIGHT: 67 IN | SYSTOLIC BLOOD PRESSURE: 146 MMHG | WEIGHT: 237 LBS | BODY MASS INDEX: 37.2 KG/M2 | OXYGEN SATURATION: 98 % | RESPIRATION RATE: 18 BRPM | HEART RATE: 102 BPM

## 2024-03-18 DIAGNOSIS — Z23 ENCOUNTER FOR IMMUNIZATION: ICD-10-CM

## 2024-03-18 DIAGNOSIS — I10 BENIGN ESSENTIAL HTN: ICD-10-CM

## 2024-03-18 DIAGNOSIS — E13.9 DIABETES MELLITUS TYPE 1.5 (HCC): Primary | ICD-10-CM

## 2024-03-18 LAB — SL AMB POCT HEMOGLOBIN AIC: 7.6 (ref ?–6.5)

## 2024-03-18 PROCEDURE — 3079F DIAST BP 80-89 MM HG: CPT | Performed by: FAMILY MEDICINE

## 2024-03-18 PROCEDURE — 3077F SYST BP >= 140 MM HG: CPT | Performed by: FAMILY MEDICINE

## 2024-03-18 PROCEDURE — 90471 IMMUNIZATION ADMIN: CPT | Performed by: FAMILY MEDICINE

## 2024-03-18 PROCEDURE — 99213 OFFICE O/P EST LOW 20 MIN: CPT | Performed by: FAMILY MEDICINE

## 2024-03-18 PROCEDURE — 90472 IMMUNIZATION ADMIN EACH ADD: CPT | Performed by: FAMILY MEDICINE

## 2024-03-18 PROCEDURE — 90686 IIV4 VACC NO PRSV 0.5 ML IM: CPT | Performed by: FAMILY MEDICINE

## 2024-03-18 PROCEDURE — 90677 PCV20 VACCINE IM: CPT | Performed by: FAMILY MEDICINE

## 2024-03-18 PROCEDURE — 83036 HEMOGLOBIN GLYCOSYLATED A1C: CPT | Performed by: FAMILY MEDICINE

## 2024-03-18 RX ORDER — ONDANSETRON 4 MG/1
4 TABLET, FILM COATED ORAL EVERY 8 HOURS PRN
Qty: 10 TABLET | Refills: 0 | Status: SHIPPED | OUTPATIENT
Start: 2024-03-18

## 2024-03-18 NOTE — ASSESSMENT & PLAN NOTE
Lab Results   Component Value Date    HGBA1C 7.6 (A) 03/18/2024   Uncontrolled  HbA1c goal 7 for < 65 year old   Currently A1c trending up/down from  --> 13  Home regimen: Lantus 40 HS and Novolog 10 U TID with meals, ozempic 0.25 mg weekly     PLAN:   - Continue current medication regimen  - provided zofran for nausea for SE of ozempic   - Continue ACE-/ARB daily for renal protection   - Continue statin therapy for cardiovascular protection   - Adhere to a diabetic diet. Comprehensive diabetes education as needed.

## 2024-03-18 NOTE — ASSESSMENT & PLAN NOTE
Current BP: Blood Pressure: 146/88   JNC 8 Goal <140/90 <66 yo +T1DM  Current Medications: Labetalol 400 mg BID and enalapril 10 mg   Patient misses no doses and tolerates the meds without side effects.  Patient avoids salt.    PLAN:   - Consider increasing enalapril at next visit for better control   - Discussed adhering to htn/dash diet including limiting salt to < 2.4 g daily   - Physical activity at least 4/5x weekly of moderate intense activity of 40 minutes   - Advised to limit caffeine and alcohol   - No change in present meds. Continue HBPM, advise to bring logs to next visit

## 2024-03-18 NOTE — PROGRESS NOTES
Name: Caridad Coello      : 1985      MRN: [id number]  Encounter Provider: Eron Jameson MD  Encounter Date: 3/18/2024   Encounter department: [facility]    Assessment & Plan     1. Diabetes mellitus type 1.5 (HCC)  Assessment & Plan:  Lab Results   Component Value Date    HGBA1C 7.6 (A) 2024   Uncontrolled  HbA1c goal 7 for < 65 year old   Currently A1c trending up/down from  --> 13  Home regimen: Lantus 40 HS and Novolog 10 U TID with meals, ozempic 0.25 mg weekly     PLAN:   - Continue current medication regimen  - provided zofran for nausea for SE of ozempic   - Continue ACE-/ARB daily for renal protection   - Continue statin therapy for cardiovascular protection   - Adhere to a diabetic diet. Comprehensive diabetes education as needed.     Orders:  -     POCT hemoglobin A1c  -     ondansetron (ZOFRAN) 4 mg tablet; Take 1 tablet (4 mg total) by mouth every 8 (eight) hours as needed for nausea or vomiting    2. Benign essential HTN  Assessment & Plan:  Current BP: Blood Pressure: 146/88   JNC 8 Goal <140/90 <64 yo +T1DM  Current Medications: Labetalol 400 mg BID and enalapril 10 mg   Patient misses no doses and tolerates the meds without side effects.  Patient avoids salt.    PLAN:   - Consider increasing enalapril at next visit for better control   - Discussed adhering to htn/dash diet including limiting salt to < 2.4 g daily   - Physical activity at least 4/5x weekly of moderate intense activity of 40 minutes   - Advised to limit caffeine and alcohol   - No change in present meds. Continue HBPM, advise to bring logs to next visit      3. Encounter for immunization  -     Pneumococcal Conjugate Vaccine 20-valent (Pcv20)  -     influenza vaccine, quadrivalent, 0.5 mL, preservative-free, for adult and pediatric patients 6 mos+ (AFLURIA, FLUARIX, FLULAVAL, FLUZONE)           Subjective       Caridad Coello is a 38 y.o. female with pmhx of t1dm and htn  presenting today  review of chronic conditions.     Patient is accompanied by her children     Patient reports that they are doing well/ have been compliant with medications.      Diabetes  Her disease course has been improving. Pertinent negatives for hypoglycemia include no confusion, dizziness, headaches, hunger, mood changes, nervousness/anxiousness, pallor, seizures, sleepiness, speech difficulty, sweats or tremors. Pertinent negatives for diabetes include no blurred vision, no chest pain, no fatigue, no foot paresthesias, no foot ulcerations, no polydipsia, no polyphagia and no polyuria. There are no hypoglycemic complications. Symptoms are stable. There are no diabetic complications. Risk factors for coronary artery disease include diabetes mellitus, hypertension, sedentary lifestyle and obesity. She is currently taking insulin pre-breakfast, pre-lunch, pre-dinner and at bedtime. Insulin injections are given by patient.     Review of Systems   Constitutional:  Negative for chills, fatigue and fever.   HENT:  Negative for ear pain and sore throat.    Eyes:  Negative for blurred vision, pain and visual disturbance.   Respiratory:  Negative for cough and shortness of breath.    Cardiovascular:  Negative for chest pain and palpitations.   Gastrointestinal:  Negative for abdominal pain and vomiting.   Endocrine: Negative for polydipsia, polyphagia and polyuria.   Genitourinary:  Negative for dysuria and hematuria.   Musculoskeletal:  Negative for arthralgias and back pain.   Skin:  Negative for color change, pallor and rash.   Neurological:  Negative for dizziness, tremors, seizures, syncope, speech difficulty and headaches.   Psychiatric/Behavioral:  Negative for confusion. The patient is not nervous/anxious.    All other systems reviewed and are negative.      Past Medical History:   Diagnosis Date   • Dental abscess 10/23/2020   • Diabetes mellitus (HCC)    • Hypokalemia 06/08/2020   • Pseudotumor cerebri       Past Surgical History:   Procedure Laterality Date   • TONSILLECTOMY       No family history on file.  Social History     Socioeconomic History   • Marital status: Single     Spouse name: None   • Number of children: None   • Years of education: None   • Highest education level: None   Occupational History   • None   Tobacco Use   • Smoking status: Every Day     Current packs/day: 0.25     Types: Cigarettes     Passive exposure: Current   • Smokeless tobacco: Never   • Tobacco comments:     pt quit 27 days ago   Vaping Use   • Vaping status: Never Used   Substance and Sexual Activity   • Alcohol use: No   • Drug use: No   • Sexual activity: None   Other Topics Concern   • None   Social History Narrative   • None     Social Determinants of Health     Financial Resource Strain: Low Risk  (6/16/2023)    Received from [facility]    Overall Financial Resource Strain (CARDIA)    • Difficulty of Paying Living Expenses: Not hard at all   Food Insecurity: No Food Insecurity (6/16/2023)    Received from [facility]    Hunger Vital Sign    • Worried About Running Out of Food in the Last Year: Never true    • Ran Out of Food in the Last Year: Never true   Transportation Needs: No Transportation Needs (6/16/2023)    Received from [facility]    PRAPARE - Transportation    • Lack of Transportation (Medical): No    • Lack of Transportation (Non-Medical): No   Physical Activity: Unknown (12/14/2022)    Received from [facility]    Exercise Vital Sign    • Days of Exercise per Week: 3 days    • Minutes of Exercise per Session: Not on file   Stress: Stress Concern Present (12/14/2022)    Received from [facility]    [place] [employer] - Occupational Stress Questionnaire    • Feeling of Stress : To some extent   Social Connections: Unknown (12/14/2022)    Received from [facility]    Social Connection and  Isolation Panel [NHANES]    • Frequency of Communication with Friends and Family: Three times a week    • Frequency of Social Gatherings with Friends and Family: Not on file    • Attends Mandaen Services: Not on file    • Active Member of Clubs or Organizations: Not on file    • Attends Club or Organization Meetings: Not on file    • Marital Status: Not on file   Intimate Partner Violence: Not At Risk (6/16/2023)    Received from [facility]    Humiliation, Afraid, Rape, and Kick questionnaire    • Fear of Current or Ex-Partner: No    • Emotionally Abused: No    • Physically Abused: No    • Sexually Abused: No   Housing Stability: Unknown (3/18/2024)    Housing Stability Vital Sign    • Unable to Pay for Housing in the Last Year: No    • Number of Places Lived in the Last Year: Not on file    • Unstable Housing in the Last Year: No     Current Outpatient Medications on File Prior to Visit   Medication Sig   • semaglutide, 0.25 or 0.5 mg/dose, (Ozempic, 0.25 or 0.5 MG/DOSE,) 2 mg/1.5 mL injection pen Inject 0.25 mg under the skin every 7 days   • albuterol (PROVENTIL HFA,VENTOLIN HFA) 90 mcg/act inhaler Inhale 2 puffs every 6 (six) hours as needed for wheezing or shortness of breath   • atorvastatin (LIPITOR) 10 mg tablet TAKE 1 TABLET BY MOUTH EVERY DAY   • Blood Pressure Monitoring (3 Series BP Monitor/Upper Arm) INGA Use in the morning   • enalapril (VASOTEC) 10 mg tablet Take 1 tablet (10 mg total) by mouth daily   • ferrous sulfate 324 (65 Fe) mg Take 1 tablet (324 mg total) by mouth every other day   • labetalol (NORMODYNE) 200 mg tablet Take 2 tablets (400 mg total) by mouth 2 (two) times a day     Allergies   Allergen Reactions   • Bee Venom Anaphylaxis     Anaphylaxis   • Nabumetone Anaphylaxis     Anaphylaxis   • Orange Syrup Anaphylaxis   • Prochlorperazine Anaphylaxis     Anaphylaxis   • Chlorpheniramine    • Codeine    • Diphenhydramine      Other reaction(s): head and neck swelling  "  • Pseudoephedrine    • Tetanus Toxoid      Immunization History   Administered Date(s) Administered   • COVID-19 PFIZER VACCINE 0.3 ML IM 07/26/2021, 08/16/2021   • DTP 1985, 1985, 02/28/1985, 04/10/1986, 08/24/1988   • Hep B, adult 10/10/2014, 11/13/2014, 04/15/2015   • Hib (PRP-T) 08/27/1986   • INFLUENZA 10/02/2014, 10/18/2018, 10/18/2018, 11/04/2019   • Influenza Split 11/30/2012, 11/30/2012   • Influenza, injectable, quadrivalent, preservative free 0.5 mL 03/18/2024   • Influenza, recombinant, quadrivalent,injectable, preservative free 11/04/2019, 09/09/2022   • MMR 01/29/1986, 06/05/1991   • OPV 1985, 1985, 04/10/1986, 08/24/1988   • Pneumococcal Conjugate Vaccine 20-valent (Pcv20), Polysace 03/18/2024   • Pneumococcal Polysaccharide PPV23 10/02/2014   • Tuberculin Skin Test-PPD Intradermal 04/22/2019, 04/22/2019, 09/20/2023       Objective     /88 (BP Location: Right arm, Patient Position: Sitting, Cuff Size: Large)   Pulse 102   Temp 98.6 °F (37 °C) (Temporal)   Resp 18   Ht 5' 7\" (1.702 m)   Wt 108 kg (237 lb)   SpO2 98%   BMI 37.12 kg/m²     Physical Exam  Constitutional:       Appearance: Normal appearance.   HENT:      Head: Normocephalic and atraumatic.   Eyes:      Extraocular Movements: Extraocular movements intact.      Pupils: Pupils are equal, round, and reactive to light.   Cardiovascular:      Rate and Rhythm: Normal rate and regular rhythm.      Pulses: Normal pulses.      Heart sounds: Normal heart sounds.   Pulmonary:      Effort: Pulmonary effort is normal. No respiratory distress.      Breath sounds: Normal breath sounds. No stridor. No wheezing, rhonchi or rales.   Abdominal:      General: Bowel sounds are normal. There is no distension.      Palpations: Abdomen is soft.      Tenderness: There is no abdominal tenderness.   Skin:     General: Skin is warm.      Capillary Refill: Capillary refill takes less than 2 seconds.   Neurological:      General: " No focal deficit present.      Mental Status: She is alert and oriented to person, place, and time.   Psychiatric:         Mood and Affect: Mood normal.         Behavior: Behavior normal.       Eron Jameson MD

## 2024-03-22 ENCOUNTER — APPOINTMENT (OUTPATIENT)
Dept: RADIOLOGY | Age: 39
End: 2024-03-22
Payer: OTHER MISCELLANEOUS

## 2024-03-22 ENCOUNTER — OCCMED (OUTPATIENT)
Dept: URGENT CARE | Age: 39
End: 2024-03-22
Payer: OTHER MISCELLANEOUS

## 2024-03-22 DIAGNOSIS — S49.91XA INJURY OF RIGHT SHOULDER, INITIAL ENCOUNTER: ICD-10-CM

## 2024-03-22 DIAGNOSIS — S49.91XA INJURY OF RIGHT SHOULDER, INITIAL ENCOUNTER: Primary | ICD-10-CM

## 2024-03-22 PROCEDURE — 99283 EMERGENCY DEPT VISIT LOW MDM: CPT

## 2024-03-22 PROCEDURE — G0382 LEV 3 HOSP TYPE B ED VISIT: HCPCS

## 2024-03-22 PROCEDURE — 73030 X-RAY EXAM OF SHOULDER: CPT

## 2024-03-26 ENCOUNTER — APPOINTMENT (OUTPATIENT)
Dept: URGENT CARE | Age: 39
End: 2024-03-26
Payer: OTHER MISCELLANEOUS

## 2024-03-26 PROCEDURE — 99213 OFFICE O/P EST LOW 20 MIN: CPT

## 2024-04-02 ENCOUNTER — APPOINTMENT (OUTPATIENT)
Dept: URGENT CARE | Age: 39
End: 2024-04-02
Payer: OTHER MISCELLANEOUS

## 2024-04-02 PROCEDURE — 99213 OFFICE O/P EST LOW 20 MIN: CPT

## 2024-04-10 ENCOUNTER — APPOINTMENT (OUTPATIENT)
Dept: URGENT CARE | Age: 39
End: 2024-04-10
Payer: OTHER MISCELLANEOUS

## 2024-04-10 PROCEDURE — 99214 OFFICE O/P EST MOD 30 MIN: CPT

## 2024-04-17 ENCOUNTER — APPOINTMENT (OUTPATIENT)
Dept: URGENT CARE | Age: 39
End: 2024-04-17
Payer: OTHER MISCELLANEOUS

## 2024-04-17 PROCEDURE — 99214 OFFICE O/P EST MOD 30 MIN: CPT | Performed by: PHYSICIAN ASSISTANT

## 2024-04-22 ENCOUNTER — OFFICE VISIT (OUTPATIENT)
Dept: FAMILY MEDICINE CLINIC | Facility: CLINIC | Age: 39
End: 2024-04-22

## 2024-04-22 VITALS
WEIGHT: 293 LBS | RESPIRATION RATE: 16 BRPM | HEIGHT: 67 IN | OXYGEN SATURATION: 97 % | DIASTOLIC BLOOD PRESSURE: 100 MMHG | SYSTOLIC BLOOD PRESSURE: 160 MMHG | TEMPERATURE: 98 F | HEART RATE: 105 BPM | BODY MASS INDEX: 45.99 KG/M2

## 2024-04-22 DIAGNOSIS — I10 BENIGN ESSENTIAL HTN: Primary | ICD-10-CM

## 2024-04-22 DIAGNOSIS — E66.01 CLASS 3 SEVERE OBESITY DUE TO EXCESS CALORIES WITH SERIOUS COMORBIDITY AND BODY MASS INDEX (BMI) OF 45.0 TO 49.9 IN ADULT (HCC): ICD-10-CM

## 2024-04-22 DIAGNOSIS — E13.9 DIABETES MELLITUS TYPE 1.5 (HCC): ICD-10-CM

## 2024-04-22 DIAGNOSIS — F32.A DEPRESSIVE DISORDER: ICD-10-CM

## 2024-04-22 PROCEDURE — 99213 OFFICE O/P EST LOW 20 MIN: CPT | Performed by: FAMILY MEDICINE

## 2024-04-22 PROCEDURE — 3080F DIAST BP >= 90 MM HG: CPT | Performed by: FAMILY MEDICINE

## 2024-04-22 PROCEDURE — 3077F SYST BP >= 140 MM HG: CPT | Performed by: FAMILY MEDICINE

## 2024-04-22 RX ORDER — BLOOD PRESSURE TEST KIT
KIT MISCELLANEOUS DAILY
Qty: 1 KIT | Refills: 0 | Status: SHIPPED | OUTPATIENT
Start: 2024-04-22

## 2024-04-22 RX ORDER — ENALAPRIL MALEATE 10 MG/1
20 TABLET ORAL DAILY
Qty: 30 TABLET | Refills: 1 | Status: SHIPPED | OUTPATIENT
Start: 2024-04-22

## 2024-04-22 NOTE — PROGRESS NOTES
Name: Caridad Coello      : 1985      MRN: [id number]  Encounter Provider: Dheeraj Penaloza MD  Encounter Date: 2024   Encounter department: [facility]    Assessment & Plan     1. Benign essential HTN  Assessment & Plan:  Follow-up visit.  Stable and asymptomatic  /100 elevated in the office today, goal less than 140/90.  Current regimen include labetalol 400 mg twice daily and enalapril 10 mg p.o. daily, reports history of noncompliance  Dietary counseling provided, low-sodium, encouraged weight loss and exercise as tolerated.  Will increase enalapril from 10 mg to 20 mg p.o. daily and Continue labetalol 400 mg twice daily  Discussed plan to at today antihypertensive meds if her blood pressure remains elevated despite changes to enalapril.  Patient in agreement with plan  Continue on home blood pressure monitoring, order home blood pressure monitoring kit  Follow-up with PCP  Follow-up with the management and nutrition services    Orders:  -     Blood Pressure KIT; Use in the morning  -     enalapril (VASOTEC) 10 mg tablet; Take 2 tablets (20 mg total) by mouth daily    2. Diabetes mellitus type 1.5 (HCC)  Assessment & Plan:    Lab Results   Component Value Date    HGBA1C 7.6 (A) 2024     Current regimen Ozempic 0.25 weekly, Lantus 40 units at bedtime, NovoLog 10 units 3 times daily with meals  .  Denies hypo-/hyperglycemic episodes  Continue on current antidiabetic regimen  Continue Lipitor  Dietary counseling provided, follow-up with nutrition, weight management and PCP  Diabetic foot exam, future  Diabetic iris exam, future      3. Depressive disorder  Assessment & Plan:  PHQ-9 positive for mild depression in office today.  Denies SI/HI.  Currently in the grieving process due to loss of a family member above 3 weeks ago.  Currently follow outpatient with psychiatry and reports being on Zoloft 25 mg p.o. daily    Plan  Brief counseling  provided  Outpatient mental health resources provided to patient  Continue on Zoloft as prescribed by psych outpatient  Follow-up with psychiatry  Follow-up with PCP      4. Class 3 severe obesity due to excess calories with serious comorbidity and body mass index (BMI) of 45.0 to 49.9 in adult ([facility])  Assessment & Plan:  BMI Counseling: Body mass index is 46.52 kg/m². The BMI is above normal. Nutrition recommendations include decreasing portion sizes, encouraging healthy choices of fruits and vegetables, decreasing fast food intake, consuming healthier snacks and limiting drinks that contain sugar. Exercise recommendations include moderate physical activity 150 minutes/week, vigorous physical activity 75 minutes/week and exercising 3-5 times per week. Patient referred to nutritionist and PCP. Rationale for BMI follow-up plan is due to patient being overweight or obese.                 Subjective      Patient is a 38 years old female who presents to the office for follow-up visit for hypertension.  Patient blood pressure has been elevated above 150 over the last 3 visits despite compliance with her current regimen labetalol and enalapril.  Patient states she has been stressed out recently due to death in her family.  She continues to smoke cigarettes and not willing to quit at this time.  She does not have a blood pressure monitoring kit at home to check her BP.  She is working on her diet and plan to join the gym.  Denies chest pain, shortness of breath, palpitations, abdominal pain, visual changes.  Patient has no acute medical complaints today.    HPI  Review of Systems   Constitutional: Negative.    Respiratory: Negative.     Cardiovascular: Negative.    Gastrointestinal: Negative.    Genitourinary: Negative.    Musculoskeletal: Negative.    Skin: Negative.    Neurological: Negative.    Psychiatric/Behavioral: Negative.         Current Outpatient Medications on File Prior to Visit   Medication Sig    albuterol  "(PROVENTIL HFA,VENTOLIN HFA) 90 mcg/act inhaler Inhale 2 puffs every 6 (six) hours as needed for wheezing or shortness of breath    atorvastatin (LIPITOR) 10 mg tablet TAKE 1 TABLET BY MOUTH EVERY DAY    ferrous sulfate 324 (65 Fe) mg Take 1 tablet (324 mg total) by mouth every other day    labetalol (NORMODYNE) 200 mg tablet Take 2 tablets (400 mg total) by mouth 2 (two) times a day    ondansetron (ZOFRAN) 4 mg tablet Take 1 tablet (4 mg total) by mouth every 8 (eight) hours as needed for nausea or vomiting    semaglutide, 0.25 or 0.5 mg/dose, (Ozempic, 0.25 or 0.5 MG/DOSE,) 2 mg/1.5 mL injection pen Inject 0.25 mg under the skin every 7 days       Objective     /100 (BP Location: Right arm, Patient Position: Sitting, Cuff Size: Large)   Pulse 105   Temp 98 °F (36.7 °C) (Temporal)   Resp 16   Ht 5' 7\" (1.702 m)   Wt 135 kg (297 lb)   SpO2 97%   BMI 46.52 kg/m²     Physical Exam  Constitutional:       General: She is not in acute distress.     Appearance: She is obese.   HENT:      Head: Atraumatic.      Mouth/Throat:      Mouth: Mucous membranes are moist.   Eyes:      Conjunctiva/sclera: Conjunctivae normal.   Cardiovascular:      Rate and Rhythm: Normal rate and regular rhythm.      Pulses: Normal pulses.   Pulmonary:      Effort: Pulmonary effort is normal.      Breath sounds: Normal breath sounds.   Abdominal:      General: Bowel sounds are normal.      Palpations: Abdomen is soft.   Psychiatric:         Mood and Affect: Mood normal.       Dheeraj Penaloza MD    "

## 2024-04-23 NOTE — ASSESSMENT & PLAN NOTE
Follow-up visit.  Stable and asymptomatic  /100 elevated in the office today, goal less than 140/90.  Current regimen include labetalol 400 mg twice daily and enalapril 10 mg p.o. daily, reports history of noncompliance  Dietary counseling provided, low-sodium, encouraged weight loss and exercise as tolerated.  Will increase enalapril from 10 mg to 20 mg p.o. daily and Continue labetalol 400 mg twice daily  Discussed plan to at today antihypertensive meds if her blood pressure remains elevated despite changes to enalapril.  Patient in agreement with plan  Continue on home blood pressure monitoring, order home blood pressure monitoring kit  Follow-up with PCP  Follow-up with the management and nutrition services

## 2024-04-23 NOTE — ASSESSMENT & PLAN NOTE
BMI Counseling: Body mass index is 46.52 kg/m². The BMI is above normal. Nutrition recommendations include decreasing portion sizes, encouraging healthy choices of fruits and vegetables, decreasing fast food intake, consuming healthier snacks and limiting drinks that contain sugar. Exercise recommendations include moderate physical activity 150 minutes/week, vigorous physical activity 75 minutes/week and exercising 3-5 times per week. Patient referred to nutritionist and PCP. Rationale for BMI follow-up plan is due to patient being overweight or obese.

## 2024-04-23 NOTE — ASSESSMENT & PLAN NOTE
Lab Results   Component Value Date    HGBA1C 7.6 (A) 03/18/2024     Current regimen Ozempic 0.25 weekly, Lantus 40 units at bedtime, NovoLog 10 units 3 times daily with meals  .  Denies hypo-/hyperglycemic episodes  Continue on current antidiabetic regimen  Continue Lipitor  Dietary counseling provided, follow-up with nutrition, weight management and PCP  Diabetic foot exam, future  Diabetic iris exam, future

## 2024-04-23 NOTE — ASSESSMENT & PLAN NOTE
PHQ-9 positive for mild depression in office today.  Denies SI/HI.  Currently in the grieving process due to loss of a family member above 3 weeks ago.  Currently follow outpatient with psychiatry and reports being on Zoloft 25 mg p.o. daily    Plan  Brief counseling provided  Outpatient mental health resources provided to patient  Continue on Zoloft as prescribed by psych outpatient  Follow-up with psychiatry  Follow-up with PCP

## 2024-04-25 ENCOUNTER — APPOINTMENT (OUTPATIENT)
Dept: URGENT CARE | Age: 39
End: 2024-04-25
Payer: OTHER MISCELLANEOUS

## 2024-04-25 PROCEDURE — 99214 OFFICE O/P EST MOD 30 MIN: CPT | Performed by: PHYSICIAN ASSISTANT

## 2024-05-09 ENCOUNTER — APPOINTMENT (OUTPATIENT)
Dept: URGENT CARE | Age: 39
End: 2024-05-09
Payer: OTHER MISCELLANEOUS

## 2024-05-09 PROCEDURE — 99214 OFFICE O/P EST MOD 30 MIN: CPT | Performed by: PHYSICIAN ASSISTANT

## 2024-05-17 ENCOUNTER — OFFICE VISIT (OUTPATIENT)
Dept: FAMILY MEDICINE CLINIC | Facility: CLINIC | Age: 39
End: 2024-05-17

## 2024-05-17 VITALS
HEIGHT: 67 IN | RESPIRATION RATE: 20 BRPM | BODY MASS INDEX: 45.64 KG/M2 | OXYGEN SATURATION: 98 % | TEMPERATURE: 97.9 F | HEART RATE: 64 BPM | WEIGHT: 290.8 LBS | SYSTOLIC BLOOD PRESSURE: 152 MMHG | DIASTOLIC BLOOD PRESSURE: 76 MMHG

## 2024-05-17 DIAGNOSIS — I10 BENIGN ESSENTIAL HTN: Primary | ICD-10-CM

## 2024-05-17 PROCEDURE — 99213 OFFICE O/P EST LOW 20 MIN: CPT | Performed by: FAMILY MEDICINE

## 2024-05-17 PROCEDURE — 3077F SYST BP >= 140 MM HG: CPT | Performed by: FAMILY MEDICINE

## 2024-05-17 PROCEDURE — 3078F DIAST BP <80 MM HG: CPT | Performed by: FAMILY MEDICINE

## 2024-05-17 RX ORDER — LISINOPRIL AND HYDROCHLOROTHIAZIDE 25; 20 MG/1; MG/1
1 TABLET ORAL DAILY
Qty: 90 TABLET | Refills: 0 | Status: SHIPPED | OUTPATIENT
Start: 2024-05-17 | End: 2024-05-20 | Stop reason: SDUPTHER

## 2024-05-17 NOTE — ASSESSMENT & PLAN NOTE
Not well controlled   Currently on labetalol 400 mg BID and enalapril 20 mg qd   BP today: 152/76 mmHg    Plan:  Discontinue Enapril, start lisinopril-HCTZ 20-25 mg qd, continue labetalol   CMP in 2 weeks   Follow up in 4 weeks

## 2024-05-17 NOTE — PROGRESS NOTES
"Ambulatory Visit  Name: Caridad Coello      : 1985      MRN: [id number]  Encounter Provider: Markus Millard MD  Encounter Date: 2024   Encounter department: [facility]    Assessment & Plan   1. Benign essential HTN  Assessment & Plan:  Not well controlled   Currently on labetalol 400 mg BID and enalapril 20 mg qd   BP today: 152/76 mmHg    Plan:  Discontinue Enapril, start lisinopril-HCTZ 20-25 mg qd, continue labetalol   CMP in 2 weeks   Follow up in 4 weeks   Orders:  -     lisinopril-hydrochlorothiazide (PRINZIDE,ZESTORETIC) 20-25 MG per tablet; Take 1 tablet by mouth daily  -     Basic metabolic panel; Future; Expected date: 2024       History of Present Illness     38 y.o female with PMH of  HTN, DM, hyperlipidemia, depression, anemia and obesity who presents today for evaluation of HTN. Patient reports that she has be compliant with medications. She denies headache, chest pain, or vision disturbances         Review of Systems   Respiratory:  Negative for shortness of breath.    Cardiovascular:  Negative for chest pain and leg swelling.   Neurological:  Negative for headaches.       Objective     /76 (BP Location: Right arm, Patient Position: Sitting, Cuff Size: Large)   Pulse 64   Temp 97.9 °F (36.6 °C) (Temporal)   Resp 20   Ht 5' 7\" (1.702 m)   Wt 132 kg (290 lb 12.8 oz)   SpO2 98%   Breastfeeding Unknown   BMI 45.55 kg/m²     Physical Exam  Vitals and nursing note reviewed.   Constitutional:       General: She is not in acute distress.     Appearance: She is well-developed.   HENT:      Head: Normocephalic and atraumatic.   Eyes:      Conjunctiva/sclera: Conjunctivae normal.   Cardiovascular:      Rate and Rhythm: Normal rate and regular rhythm.      Heart sounds: No murmur heard.  Pulmonary:      Effort: Pulmonary effort is normal. No respiratory distress.      Breath sounds: Normal breath sounds.   Abdominal:      Palpations: Abdomen " is soft.      Tenderness: There is no abdominal tenderness.   Musculoskeletal:         General: No swelling.      Cervical back: Neck supple.   Skin:     General: Skin is warm and dry.      Capillary Refill: Capillary refill takes less than 2 seconds.   Neurological:      Mental Status: She is alert.   Psychiatric:         Mood and Affect: Mood normal.       Administrative Statements

## 2024-05-20 DIAGNOSIS — I10 BENIGN ESSENTIAL HTN: ICD-10-CM

## 2024-05-20 RX ORDER — LISINOPRIL AND HYDROCHLOROTHIAZIDE 25; 20 MG/1; MG/1
1 TABLET ORAL DAILY
Qty: 90 TABLET | Refills: 0 | Status: SHIPPED | OUTPATIENT
Start: 2024-05-20 | End: 2024-08-18

## 2024-05-21 ENCOUNTER — OFFICE VISIT (OUTPATIENT)
Dept: OBGYN CLINIC | Facility: CLINIC | Age: 39
End: 2024-05-21

## 2024-05-21 VITALS
HEART RATE: 114 BPM | WEIGHT: 290 LBS | HEIGHT: 67 IN | BODY MASS INDEX: 45.52 KG/M2 | SYSTOLIC BLOOD PRESSURE: 147 MMHG | DIASTOLIC BLOOD PRESSURE: 96 MMHG

## 2024-05-21 DIAGNOSIS — M25.511 CHRONIC RIGHT SHOULDER PAIN: Primary | ICD-10-CM

## 2024-05-21 DIAGNOSIS — G89.29 CHRONIC RIGHT SHOULDER PAIN: Primary | ICD-10-CM

## 2024-05-21 PROCEDURE — 99203 OFFICE O/P NEW LOW 30 MIN: CPT | Performed by: ORTHOPAEDIC SURGERY

## 2024-05-21 RX ORDER — MEDROXYPROGESTERONE ACETATE 150 MG/ML
150 INJECTION, SUSPENSION INTRAMUSCULAR
COMMUNITY
Start: 2024-02-27

## 2024-05-21 RX ORDER — SERTRALINE HYDROCHLORIDE 100 MG/1
100 TABLET, FILM COATED ORAL DAILY
COMMUNITY

## 2024-05-21 NOTE — PROGRESS NOTES
CHIEF COMPLAIN/REASON FOR VISIT  Chief Complaint   Patient presents with    Right Shoulder - Pain       HISTORY OF PRESENT ILLNESS  Caridad Coello is a RHD 38 y.o. female who presents for evaluation of their right shoulder. This is a worker's compensation case. Patient said on March 21st, 2024 she slipped and fell onto a wheel well injuring her right shoulder. Patient said she has continued to experience pain in the shoulder since the injury with limitation in ROM. She said she has been doing PT since the beginning of April 2024 which has provided minimal relief. She has tried Robaxin from urgent care which hasn't ehlped.    3/21/24-slipped and fell onto wheel well on right shoulder    paratransit  Has been doing PT since begin of April 2024  No cortisone injections  Tried robaxin    REVIEW OF SYSTEMS  Review of systems was performed and, woutside that mentioned in the HPI, it was negative for symptomology related to the integumentary, hematologic, immunologic, allergic, neurologic, cardiovascular, respiratory, GI or  systems.     MEDICAL HISTORY  Patient Active Problem List   Diagnosis    Mild intermittent asthma without complication    Benign essential HTN    Depressive disorder    Gastroesophageal reflux disease without esophagitis    Class 3 severe obesity due to excess calories with serious comorbidity and body mass index (BMI) of 45.0 to 49.9 in adult ([facility])    Adjustment disorder with mixed anxiety and depressed mood    Diabetes mellitus type 1.5 ([facility])    Mixed hyperlipidemia    Anxiety    Iron deficiency anemia    Irregular menses    H. pylori infection    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 ([facility])    Annual physical exam    Diverticulosis       SURGICAL HISTORY  Past Surgical History:   Procedure Laterality Date    TONSILLECTOMY         CURRENT MEDICATIONS    Current Outpatient Medications:     albuterol (PROVENTIL HFA,VENTOLIN HFA) 90 mcg/act inhaler, Inhale 2 puffs every 6 (six) hours as  needed for wheezing or shortness of breath, Disp: 6.7 g, Rfl: 0    atorvastatin (LIPITOR) 10 mg tablet, TAKE 1 TABLET BY MOUTH EVERY DAY, Disp: 30 tablet, Rfl: 3    Blood Pressure KIT, Use in the morning, Disp: 1 kit, Rfl: 0    ferrous sulfate 324 (65 Fe) mg, Take 1 tablet (324 mg total) by mouth every other day, Disp: 60 tablet, Rfl: 1    glucose blood test strip, Use, Disp: , Rfl:     labetalol (NORMODYNE) 200 mg tablet, Take 2 tablets (400 mg total) by mouth 2 (two) times a day, Disp: 120 tablet, Rfl: 3    medroxyPROGESTERone (DEPO-PROVERA) 150 mg/mL injection, Inject 150 mg into a muscle every 3 (three) months, Disp: , Rfl:     NON FORMULARY, Inject 5 Units under the skin 3 (three) times a day before meals Insulin pen needles, Disp: , Rfl:     NON FORMULARY, 1 STICK BY MISCELLANEOUS ROUTE DAILY., Disp: , Rfl:     ondansetron (ZOFRAN) 4 mg tablet, Take 1 tablet (4 mg total) by mouth every 8 (eight) hours as needed for nausea or vomiting, Disp: 10 tablet, Rfl: 0    semaglutide, 0.25 or 0.5 mg/dose, (Ozempic, 0.25 or 0.5 MG/DOSE,) 2 mg/1.5 mL injection pen, Inject 0.25 mg under the skin every 7 days, Disp: , Rfl:     sertraline (ZOLOFT) 100 mg tablet, Take 100 mg by mouth daily, Disp: , Rfl:     lisinopril-hydrochlorothiazide (PRINZIDE,ZESTORETIC) 20-25 MG per tablet, Take 1 tablet by mouth daily (Patient not taking: Reported on 5/21/2024), Disp: 90 tablet, Rfl: 0    SOCIAL HISTORY  Social History     Socioeconomic History    Marital status: Single     Spouse name: Not on file    Number of children: Not on file    Years of education: Not on file    Highest education level: Not on file   Occupational History    Not on file   Tobacco Use    Smoking status: Every Day     Current packs/day: 0.25     Types: Cigarettes     Passive exposure: Current    Smokeless tobacco: Never    Tobacco comments:     pt quit 27 days ago   Vaping Use    Vaping status: Never Used   Substance and Sexual Activity    Alcohol use: No    Drug  use: No    Sexual activity: Not on file   Other Topics Concern    Not on file   Social History Narrative    Not on file     Social Determinants of Health     Financial Resource Strain: Medium Risk (4/18/2024)    Overall Financial Resource Strain (CARDIA)     Difficulty of Paying Living Expenses: Somewhat hard   Food Insecurity: No Food Insecurity (4/18/2024)    Hunger Vital Sign     Worried About Running Out of Food in the Last Year: Never true     Ran Out of Food in the Last Year: Never true   Transportation Needs: Unmet Transportation Needs (4/18/2024)    PRAPARE - Transportation     Lack of Transportation (Medical): No     Lack of Transportation (Non-Medical): Yes   Physical Activity: Unknown (12/14/2022)    Received from [facility]    Exercise Vital Sign     Days of Exercise per Week: 3 days     Minutes of Exercise per Session: Not on file   Stress: Stress Concern Present (12/14/2022)    Received from [facility], [facility]    [employer] - Occupational Stress Questionnaire     Feeling of Stress : To some extent   Social Connections: Unknown (12/14/2022)    Received from [facility], [facility]    Social Connection and Isolation Panel [NHANES]     Frequency of Communication with Friends and Family: Three times a week     Frequency of Social Gatherings with Friends and Family: Not on file     Attends Latter-day Services: Not on file     Active Member of Clubs or Organizations: Not on file     Attends Club or Organization Meetings: Not on file     Marital Status: Not on file   Intimate Partner Violence: Not At Risk (6/16/2023)    Received from [facility], [facility]    Humiliation, Afraid, Rape, and Kick questionnaire     Fear of Current or Ex-Partner: No     Emotionally Abused: No     Physically Abused: No     Sexually Abused: No   Housing Stability: Low Risk   "(4/18/2024)    Housing Stability Vital Sign     Unable to Pay for Housing in the Last Year: No     Number of Places Lived in the Last Year: 1     Unstable Housing in the Last Year: No       Objective     VITAL SIGNS  /96 (BP Location: Left arm, Patient Position: Sitting, Cuff Size: Large)   Pulse (!) 114   Ht 5' 7\" (1.702 m) Comment: verbal  Wt 132 kg (290 lb)   BMI 45.42 kg/m²      PHYSICAL EXAM  General:   Well-appearing  No acute distress  Appears stated age    Cervical Spine  No tenderness to palpation over the cervical spine in the midline or of the paraspinal muscles  Full range of motion without pain  Negative spurlings   Negative Lhermitte test    Right Shoulder  Negative tenderness to palpation over the acromioclavicular joint  Negative tenderness to palpation over the long head of the biceps tendon  Shoulder effusion none present  Shoulder abduction 90 / 180  Shoulder forward flexion 90 / 180  Shoulder internal rotation T12  Supraspinatus/ABD 4/5   Infraspinatus/ER 4/5   Negative Belly Press/SS  Positive Neer  Positive Cuenca  Positive O'briens  Skin is intact with no erythema, warmth or drainage  Motor strength intact distally  Sensation to light touch is normal in the axillary, radial, ulnar, and median nerve distributions.  Fingers warm and perfused    RADIOGRAPHIC EXAMINATION/DIAGNOSTICS:  No results found.    ASSESSMENT/PLAN:  Right shoulder Impingement syndrome    Today, we discussed the non-operative treatment options that include, but are not limited to: rest, ice, activity modification, anti-inflammatory medication, physical therapy, and/or injections. Patient with like to initially proceed with these conservative measures. After discussion of options for formal physical therapy, anti-inflammatories and bracing with other conservative treatments, patient opted to trial these initially.   she will plan on following up in 3 months for recheck p.r.n., they voiced their understanding of " this plan and were in agreement with it. All questions answered today.

## 2024-05-30 ENCOUNTER — APPOINTMENT (OUTPATIENT)
Dept: URGENT CARE | Age: 39
End: 2024-05-30
Payer: OTHER MISCELLANEOUS

## 2024-05-30 PROCEDURE — 99214 OFFICE O/P EST MOD 30 MIN: CPT | Performed by: PHYSICIAN ASSISTANT

## 2024-06-12 ENCOUNTER — APPOINTMENT (OUTPATIENT)
Dept: URGENT CARE | Age: 39
End: 2024-06-12
Payer: OTHER MISCELLANEOUS

## 2024-06-12 ENCOUNTER — APPOINTMENT (OUTPATIENT)
Dept: LAB | Age: 39
End: 2024-06-12
Payer: COMMERCIAL

## 2024-06-12 DIAGNOSIS — I10 BENIGN ESSENTIAL HTN: ICD-10-CM

## 2024-06-12 LAB
ANION GAP SERPL CALCULATED.3IONS-SCNC: 13 MMOL/L (ref 4–13)
BUN SERPL-MCNC: 17 MG/DL (ref 5–25)
CALCIUM SERPL-MCNC: 9.5 MG/DL (ref 8.4–10.2)
CHLORIDE SERPL-SCNC: 104 MMOL/L (ref 96–108)
CO2 SERPL-SCNC: 22 MMOL/L (ref 21–32)
CREAT SERPL-MCNC: 1.04 MG/DL (ref 0.6–1.3)
GFR SERPL CREATININE-BSD FRML MDRD: 67 ML/MIN/1.73SQ M
GLUCOSE SERPL-MCNC: 217 MG/DL (ref 65–140)
POTASSIUM SERPL-SCNC: 3.7 MMOL/L (ref 3.5–5.3)
SODIUM SERPL-SCNC: 139 MMOL/L (ref 135–147)

## 2024-06-12 PROCEDURE — 99214 OFFICE O/P EST MOD 30 MIN: CPT | Performed by: PHYSICIAN ASSISTANT

## 2024-06-12 PROCEDURE — 36415 COLL VENOUS BLD VENIPUNCTURE: CPT

## 2024-06-12 PROCEDURE — 80048 BASIC METABOLIC PNL TOTAL CA: CPT

## 2024-06-19 ENCOUNTER — OFFICE VISIT (OUTPATIENT)
Dept: FAMILY MEDICINE CLINIC | Facility: CLINIC | Age: 39
End: 2024-06-19

## 2024-06-19 VITALS
OXYGEN SATURATION: 98 % | BODY MASS INDEX: 44.26 KG/M2 | HEART RATE: 126 BPM | SYSTOLIC BLOOD PRESSURE: 141 MMHG | DIASTOLIC BLOOD PRESSURE: 98 MMHG | RESPIRATION RATE: 18 BRPM | WEIGHT: 282 LBS | TEMPERATURE: 98.7 F | HEIGHT: 67 IN

## 2024-06-19 DIAGNOSIS — I10 BENIGN ESSENTIAL HTN: Primary | ICD-10-CM

## 2024-06-19 DIAGNOSIS — E13.9 DIABETES MELLITUS TYPE 1.5 (HCC): ICD-10-CM

## 2024-06-19 LAB — SL AMB POCT HEMOGLOBIN AIC: 7.5 (ref ?–6.5)

## 2024-06-19 PROCEDURE — 83036 HEMOGLOBIN GLYCOSYLATED A1C: CPT | Performed by: FAMILY MEDICINE

## 2024-06-19 PROCEDURE — 3077F SYST BP >= 140 MM HG: CPT | Performed by: FAMILY MEDICINE

## 2024-06-19 PROCEDURE — 3080F DIAST BP >= 90 MM HG: CPT | Performed by: FAMILY MEDICINE

## 2024-06-19 PROCEDURE — 99213 OFFICE O/P EST LOW 20 MIN: CPT | Performed by: FAMILY MEDICINE

## 2024-06-19 RX ORDER — AMLODIPINE BESYLATE 5 MG/1
5 TABLET ORAL DAILY
Qty: 90 TABLET | Refills: 0 | Status: SHIPPED | OUTPATIENT
Start: 2024-06-19 | End: 2024-09-17

## 2024-06-19 NOTE — PROGRESS NOTES
Ambulatory Visit  Name: Caridad Coello      : 1985      MRN: [id number]  Encounter Provider: Markus Millard MD  Encounter Date: 2024   Encounter department: [facility]    Assessment & Plan   1. Benign essential HTN  Assessment & Plan:  Improving, but suboptimally controlled   Currently on labetalol 400 mg BID; discontinued lisinopril-HCTZ 20-25 mg qd due to side effects   BP today: 141/98  mmHg  Recent BMP showed stable cr and renal function     Plan:    Discontinue lisinopril-HCTZ, added amlodipine 5 mg qd and continue labetalol 400 mg BID  Lifestyle modifications encouraged  Follow up in 3 months   Orders:  -     amLODIPine (NORVASC) 5 mg tablet; Take 1 tablet (5 mg total) by mouth daily  2. Diabetes mellitus type 1.5 (HCC)  Assessment & Plan:    Lab Results   Component Value Date    HGBA1C 7.5 (A) 2024   Suboptimally controlled   Follows with Endocrinology   Currently on Ozempic 0.5 mg q weekly,  Lantus 40 units at bedtime, NovoLog 5 units 3 times daily with meals. Patient has Dexcom  Last diabetic foot exam: 2023  IRIS exam ordered last visit     Plan:  Continue current regimen  Lifestyle modifications encouraged   Follow up in 3 months   Orders:  -     POCT hemoglobin A1c       History of Present Illness     38 y.o female with PMH of  HTN, DM, hyperlipidemia, depression, anemia and obesity who presents today for evaluation of HTN and diabetes. Patient reports to discontinue lisinopril-HCTZ because it was worsening her GI disturbances symptoms. Since she stopped symptoms have resolved. She reports taking amlodipine in the past prior her pregnancy and had good experience.           Review of Systems   Constitutional:  Negative for fever.   Cardiovascular:  Negative for chest pain, palpitations and leg swelling.   Neurological:  Negative for dizziness and light-headedness.       Objective     /98 (BP Location: Right arm, Patient Position: Sitting,  "Cuff Size: Large)   Pulse (!) 126   Temp 98.7 °F (37.1 °C) (Temporal)   Resp 18   Ht 5' 7\" (1.702 m)   Wt 128 kg (282 lb)   SpO2 98%   Breastfeeding No   BMI 44.17 kg/m²     Physical Exam  Vitals and nursing note reviewed.   Constitutional:       General: She is not in acute distress.     Appearance: She is well-developed.   HENT:      Head: Normocephalic and atraumatic.   Eyes:      Conjunctiva/sclera: Conjunctivae normal.   Cardiovascular:      Rate and Rhythm: Normal rate and regular rhythm.      Heart sounds: No murmur heard.  Pulmonary:      Effort: Pulmonary effort is normal. No respiratory distress.      Breath sounds: Normal breath sounds. No wheezing.   Musculoskeletal:         General: No swelling.      Cervical back: Neck supple.   Skin:     General: Skin is warm and dry.      Capillary Refill: Capillary refill takes less than 2 seconds.   Neurological:      Mental Status: She is alert.   Psychiatric:         Mood and Affect: Mood normal.       Administrative Statements     "

## 2024-06-19 NOTE — ASSESSMENT & PLAN NOTE
Improving, but suboptimally controlled   Currently on labetalol 400 mg BID; discontinued lisinopril-HCTZ 20-25 mg qd due to side effects   BP today: 141/98  mmHg  Recent BMP showed stable cr and renal function     Plan:    Discontinue lisinopril-HCTZ, added amlodipine 5 mg qd and continue labetalol 400 mg BID  Lifestyle modifications encouraged  Follow up in 3 months

## 2024-06-19 NOTE — ASSESSMENT & PLAN NOTE
Lab Results   Component Value Date    HGBA1C 7.5 (A) 06/19/2024   Suboptimally controlled   Follows with Endocrinology   Currently on Ozempic 0.5 mg q weekly,  Lantus 40 units at bedtime, NovoLog 5 units 3 times daily with meals. Patient has Dexcom  Last diabetic foot exam: 12/2023  IRIS exam ordered last visit     Plan:  Continue current regimen  Lifestyle modifications encouraged   Follow up in 3 months

## 2024-08-18 NOTE — PROGRESS NOTES
Virtual Regular Visit  Name: Caridad Coello      : 1985      MRN: [id number]  Encounter Provider: Markus Millard MD  Encounter Date: 2024   Encounter department: [facility]    Verification of patient location:    Patient is located at Other in the following state in which I hold an active license [place]    Assessment & Plan   1. Benign essential HTN  Assessment & Plan:  Well controlled   BP range: 128-130/70-80 mmHg  Currently on amlodipine 5 mg qd and labetalol 400 mg BID      Plan:  Continue current regimen  Low salt diet  Lifestyle modifications encouraged   Follow up in 3 months          Encounter provider Markus Millard MD    The patient was identified by name and date of birth. Caridad Coello was informed that this is a telemedicine visit and that the visit is being conducted through the Epic Embedded platform. She agrees to proceed..  My office door was closed. No one else was in the room.  She acknowledged consent and understanding of privacy and security of the video platform. The patient has agreed to participate and understands they can discontinue the visit at any time.    Patient is aware this is a billable service.     History of Present Illness     This is a 39 y.o female with PMH of HTN, Diabetes, asthma, hyperlipidemia and depression/anxiety who presents today for a virtual visit to follow up on HTN. Patient reports that she does not check het BP at home because insurance has not covered a larger cuff, however when she check in offices and pharmacies it has be ranging from 128-130/70-80 mmHg. She reports taking amlodipine and labetalol daily and has been eating better. States losing 10 pounds since May.   She follows with Endocrinology for her diabetes and states that it is finally well controlled as her last A1c was 7.     Patient reports to feel well and has no complaints today. We discussed screenings and she stated that she has a mammogram  scheduled to 8/22/24 and she just had her women annual visit with pap smear in July.      Patient denies needing any refills today        Review of Systems   Constitutional:  Negative for fever.   Respiratory:  Negative for shortness of breath and wheezing.    Cardiovascular:  Negative for chest pain, palpitations and leg swelling.   Neurological:  Negative for light-headedness and headaches.       Objective     There were no vitals taken for this visit.  Physical Exam  Neurological:      Mental Status: She is alert.   Psychiatric:         Mood and Affect: Mood normal.         Thought Content: Thought content normal.     Camera was not working properly, visit was transitioned to audio only 2 min in the encounter.     Visit Time  Total Visit Duration: 16 min

## 2024-08-18 NOTE — ASSESSMENT & PLAN NOTE
Well controlled   BP range: 128-130/70-80 mmHg  Currently on amlodipine 5 mg qd and labetalol 400 mg BID      Plan:  Continue current regimen  Low salt diet  Lifestyle modifications encouraged   Follow up in 3 months

## 2024-08-19 ENCOUNTER — TELEMEDICINE (OUTPATIENT)
Dept: FAMILY MEDICINE CLINIC | Facility: CLINIC | Age: 39
End: 2024-08-19

## 2024-08-19 DIAGNOSIS — I10 BENIGN ESSENTIAL HTN: Primary | ICD-10-CM

## 2024-08-19 PROBLEM — N92.6 IRREGULAR MENSES: Status: RESOLVED | Noted: 2022-05-19 | Resolved: 2024-08-19

## 2024-08-19 PROBLEM — A04.8 H. PYLORI INFECTION: Status: RESOLVED | Noted: 2022-06-28 | Resolved: 2024-08-19

## 2024-09-15 DIAGNOSIS — I10 BENIGN ESSENTIAL HTN: ICD-10-CM

## 2024-09-16 RX ORDER — AMLODIPINE BESYLATE 5 MG/1
5 TABLET ORAL DAILY
Qty: 90 TABLET | Refills: 0 | Status: SHIPPED | OUTPATIENT
Start: 2024-09-16 | End: 2024-12-15

## 2024-10-23 ENCOUNTER — VBI (OUTPATIENT)
Dept: ADMINISTRATIVE | Facility: OTHER | Age: 39
End: 2024-10-23

## 2024-10-23 NOTE — TELEPHONE ENCOUNTER
10/23/24 11:19 AM     Chart reviewed for Blood pressure was/were submitted to the patient's insurance.     Belle Li MA   [facility]

## 2024-11-15 ENCOUNTER — OFFICE VISIT (OUTPATIENT)
Dept: FAMILY MEDICINE CLINIC | Facility: CLINIC | Age: 39
End: 2024-11-15

## 2024-11-15 VITALS
BODY MASS INDEX: 43 KG/M2 | DIASTOLIC BLOOD PRESSURE: 88 MMHG | HEIGHT: 67 IN | HEART RATE: 105 BPM | SYSTOLIC BLOOD PRESSURE: 150 MMHG | WEIGHT: 274 LBS | TEMPERATURE: 97.3 F | OXYGEN SATURATION: 99 % | RESPIRATION RATE: 18 BRPM

## 2024-11-15 DIAGNOSIS — I10 BENIGN ESSENTIAL HTN: ICD-10-CM

## 2024-11-15 DIAGNOSIS — E13.9 DIABETES MELLITUS TYPE 1.5 (HCC): Primary | ICD-10-CM

## 2024-11-15 DIAGNOSIS — G44.52 NEW DAILY PERSISTENT HEADACHE: ICD-10-CM

## 2024-11-15 LAB — SL AMB POCT HEMOGLOBIN AIC: 7.5 (ref ?–6.5)

## 2024-11-15 PROCEDURE — 99213 OFFICE O/P EST LOW 20 MIN: CPT | Performed by: FAMILY MEDICINE

## 2024-11-15 PROCEDURE — 3079F DIAST BP 80-89 MM HG: CPT | Performed by: FAMILY MEDICINE

## 2024-11-15 PROCEDURE — 83036 HEMOGLOBIN GLYCOSYLATED A1C: CPT | Performed by: FAMILY MEDICINE

## 2024-11-15 PROCEDURE — 3077F SYST BP >= 140 MM HG: CPT | Performed by: FAMILY MEDICINE

## 2024-11-15 RX ORDER — INSULIN ASPART 100 [IU]/ML
5 INJECTION, SOLUTION INTRAVENOUS; SUBCUTANEOUS
COMMUNITY
Start: 2024-10-06

## 2024-11-15 RX ORDER — AMLODIPINE BESYLATE 5 MG/1
5 TABLET ORAL DAILY
Qty: 90 TABLET | Refills: 1 | Status: SHIPPED | OUTPATIENT
Start: 2024-11-15 | End: 2025-05-14

## 2024-11-15 RX ORDER — ACYCLOVIR 400 MG/1
1 TABLET ORAL
Qty: 9 EACH | Refills: 3 | Status: SHIPPED | OUTPATIENT
Start: 2024-11-15

## 2024-11-15 RX ORDER — ACYCLOVIR 400 MG/1
1 TABLET ORAL ONCE
Qty: 1 EACH | Refills: 0 | Status: SHIPPED | OUTPATIENT
Start: 2024-11-15 | End: 2024-11-15

## 2024-11-15 RX ORDER — INSULIN GLARGINE 300 U/ML
44 INJECTION, SOLUTION SUBCUTANEOUS
COMMUNITY
Start: 2024-09-09

## 2024-11-15 RX ORDER — LABETALOL 200 MG/1
400 TABLET, FILM COATED ORAL 2 TIMES DAILY
Qty: 180 TABLET | Refills: 3 | Status: SHIPPED | OUTPATIENT
Start: 2024-11-15 | End: 2025-05-14

## 2024-11-15 RX ORDER — ACYCLOVIR 400 MG/1
1 TABLET ORAL
COMMUNITY
Start: 2024-10-20 | End: 2024-11-15

## 2024-11-15 NOTE — ASSESSMENT & PLAN NOTE
BP Readings from Last 3 Encounters:   11/15/24 150/88   06/19/24 141/98   05/21/24 147/96      Last BP medication: 1 month ago.   BP at home measured highest systolic was 180's   Amlodipine 5 mg, labetalol 400 mg BID current meds  Had Hyzaar before - hypotensive  Labetalol started while pregnant also hx of palpitations - no longer breastfeeding     - will refill medication amlodipine and labetalol as above   - can consider switching labetalol to a ACE-I/ ARB in future for better HTN control and renal protection.  - small dose metoprolol can be used instead of labetalol if palpitations continue.  - encourage low sodium diet, weight management through conservative measures  - BP diary   - return in 2-4 weeks     Orders:    amLODIPine (NORVASC) 5 mg tablet; Take 1 tablet (5 mg total) by mouth daily    labetalol (NORMODYNE) 200 mg tablet; Take 2 tablets (400 mg total) by mouth 2 (two) times a day

## 2024-11-17 PROBLEM — R51.9 HEADACHE: Status: ACTIVE | Noted: 2024-11-17

## 2024-11-17 NOTE — ASSESSMENT & PLAN NOTE
Lab Results   Component Value Date    HGBA1C 7.5 (A) 11/15/2024     Compared to previous hemoglobin A1c in June-stable  Current management Ozempic 1 mg q. weekly, Lantus 40 units at bedtime and NovoLog 5 units 3 times a day with meals  -Patient needs refill for Dexcom  -Follows with endocrinology: Deferred management to Endo.     Orders:    Continuous Glucose Sensor (Dexcom G7 Sensor); Use 1 Device every 10 days    Continuous Glucose  (Dexcom G7 ) INGA; Use 1 each once for 1 dose    POCT hemoglobin A1c

## 2024-11-17 NOTE — PROGRESS NOTES
Name: Caridad Coello      : 1985      MRN: [id number]  Encounter Provider: Bety Zhao MD  Encounter Date: 11/15/2024   Encounter department: [facility]  :  Assessment & Plan  Benign essential HTN  BP Readings from Last 3 Encounters:   11/15/24 150/88   24 141/98   24 147/96      Last BP medication: 1 month ago.   BP at home measured highest systolic was 180's   Amlodipine 5 mg, labetalol 400 mg BID current meds  Had Hyzaar before - hypotensive  Labetalol started while pregnant also hx of palpitations - no longer breastfeeding     - will refill medication amlodipine and labetalol as above   - can consider switching labetalol to a ACE-I/ ARB in future for better HTN control and renal protection.  - small dose metoprolol can be used instead of labetalol if palpitations continue.  - encourage low sodium diet, weight management through conservative measures  - BP diary   - return in 2-4 weeks     Orders:    amLODIPine (NORVASC) 5 mg tablet; Take 1 tablet (5 mg total) by mouth daily    labetalol (NORMODYNE) 200 mg tablet; Take 2 tablets (400 mg total) by mouth 2 (two) times a day    Diabetes mellitus type 1.5 (HCC)    Lab Results   Component Value Date    HGBA1C 7.5 (A) 11/15/2024     Compared to previous hemoglobin A1c in -stable  Current management Ozempic 1 mg q. weekly, Lantus 40 units at bedtime and NovoLog 5 units 3 times a day with meals  -Patient needs refill for Dexcom  -Follows with endocrinology: Deferred management to Endo.     Orders:    Continuous Glucose Sensor (Dexcom G7 Sensor); Use 1 Device every 10 days    Continuous Glucose  (Dexcom G7 ) INGA; Use 1 each once for 1 dose    POCT hemoglobin A1c    New daily persistent headache  Aprox 1 week.-Improving, tylenol partial improvement  Differential dx include HTN vs migraine vs increased intracranial pressure.  ( Patient reports hx of pseudotumor cerebri)   Headache is unilateral  ( right sided), associated with photophobia, questionable aura which could suggest migraine however given uncontrolled HTN, headache due to elevated BP did not be ruled out.   Headache improves when supine, increased intracranial pressure less likely.     -OTC Tylenol and ibuprofen taken together    -Refrain from using of sumatriptan due to history of hypertension  -Advised to use Excedrin-patient still believes this is related to blood pressure.   -Patient has appointment with neurology in a month can address headache reoccurs                History of Present Illness     39-year-old female with history of DM, HTN presents today for medication refill.  Patient states that she has been out of of HTN medications for about a month, has tried to communicate with office through [facility] but has not gotten the refill.  Patient states that at home her systolic blood pressure has been the highest in the 180s however the machine she is using is the wrist BP machine with questionable accuracy.   Patient patient complains of headache that has started approximately a week ago, she associates it with hypertension however headache is unilateral (on her right side) seems to be worsened with exposure to light and improved somewhat when she lies down.  Patient has been taking over-the-counter Tylenol however has only partial improvement.  Headache is not associated with nausea or vomiting.  She recalls similar episode in the past that self resolved.  Headaches are not severe enough for patient to wake up at night due to it.       Review of Systems   Constitutional:  Negative for chills, fever and unexpected weight change.   HENT:  Negative for congestion, ear pain and facial swelling.    Eyes:  Positive for photophobia. Negative for visual disturbance.   Respiratory:  Negative for cough and shortness of breath.    Cardiovascular:  Negative for chest pain and palpitations.   Gastrointestinal:  Negative for constipation, diarrhea,  "nausea and vomiting.   Genitourinary:  Negative for dysuria.   Musculoskeletal:  Negative for joint swelling.   Skin:  Negative for rash.   Neurological:  Positive for headaches. Negative for dizziness, facial asymmetry, weakness, light-headedness and numbness.   All other systems reviewed and are negative.         Objective   /88 (BP Location: Left arm, Patient Position: Sitting, Cuff Size: Large)   Pulse 105   Temp (!) 97.3 °F (36.3 °C) (Temporal)   Resp 18   Ht 5' 7\" (1.702 m)   Wt 124 kg (274 lb)   SpO2 99%   BMI 42.91 kg/m²      Physical Exam  Vitals and nursing note reviewed.   Constitutional:       General: She is not in acute distress.     Appearance: She is well-developed.   HENT:      Head: Normocephalic and atraumatic.      Nose: No congestion or rhinorrhea.   Eyes:      General: No scleral icterus.     Conjunctiva/sclera: Conjunctivae normal.   Cardiovascular:      Rate and Rhythm: Normal rate and regular rhythm.      Heart sounds: No murmur heard.  Pulmonary:      Effort: Pulmonary effort is normal. No respiratory distress.      Breath sounds: Normal breath sounds.   Abdominal:      Palpations: Abdomen is soft.      Tenderness: There is no abdominal tenderness.   Musculoskeletal:         General: No swelling.      Cervical back: Neck supple.      Right lower leg: No edema.      Left lower leg: No edema.   Skin:     General: Skin is warm and dry.      Capillary Refill: Capillary refill takes less than 2 seconds.   Neurological:      General: No focal deficit present.      Mental Status: She is alert.      Cranial Nerves: No cranial nerve deficit.   Psychiatric:         Mood and Affect: Mood normal.         "

## 2024-11-17 NOTE — ASSESSMENT & PLAN NOTE
Aprox 1 week.-Improving, tylenol partial improvement  Differential dx include HTN vs migraine vs increased intracranial pressure.  ( Patient reports hx of pseudotumor cerebri)   Headache is unilateral ( right sided), associated with photophobia, questionable aura which could suggest migraine however given uncontrolled HTN, headache due to elevated BP did not be ruled out.   Headache improves when supine, increased intracranial pressure less likely.     -OTC Tylenol and ibuprofen taken together    -Refrain from using of sumatriptan due to history of hypertension  -Advised to use Excedrin-patient still believes this is related to blood pressure.   -Patient has appointment with neurology in a month can address headache reoccurs

## 2024-12-06 ENCOUNTER — OFFICE VISIT (OUTPATIENT)
Dept: FAMILY MEDICINE CLINIC | Facility: CLINIC | Age: 39
End: 2024-12-06

## 2024-12-06 VITALS
TEMPERATURE: 96.7 F | HEIGHT: 67 IN | WEIGHT: 278.2 LBS | SYSTOLIC BLOOD PRESSURE: 140 MMHG | RESPIRATION RATE: 16 BRPM | OXYGEN SATURATION: 99 % | DIASTOLIC BLOOD PRESSURE: 98 MMHG | BODY MASS INDEX: 43.66 KG/M2 | HEART RATE: 121 BPM

## 2024-12-06 DIAGNOSIS — I10 BENIGN ESSENTIAL HTN: Primary | ICD-10-CM

## 2024-12-06 PROCEDURE — 99213 OFFICE O/P EST LOW 20 MIN: CPT | Performed by: INTERNAL MEDICINE

## 2024-12-06 PROCEDURE — 3077F SYST BP >= 140 MM HG: CPT | Performed by: INTERNAL MEDICINE

## 2024-12-06 PROCEDURE — 3080F DIAST BP >= 90 MM HG: CPT | Performed by: INTERNAL MEDICINE

## 2024-12-06 RX ORDER — LOSARTAN POTASSIUM 25 MG/1
25 TABLET ORAL DAILY
Qty: 90 TABLET | Refills: 0 | Status: SHIPPED | OUTPATIENT
Start: 2024-12-06

## 2024-12-06 NOTE — PROGRESS NOTES
Name: Caridad Coello      : 1985      MRN: [id number]  Encounter Provider: Bety Zhao MD  Encounter Date: 2024   Encounter department: [facility]  :  Assessment & Plan  Benign essential HTN  BP Readings from Last 3 Encounters:   24 140/98   11/15/24 150/88   24 141/98      Amlodipine 5 mg, labetalol 400 mg BID current meds  Labetalol started while pregnant also hx of palpitations - no longer breastfeeding   BP continues to be elevated  Per chart review patient has hx of proteinuria and would benefit from ACE-I/ ARB agent now tht is no longer breastfeeding  - labetalol continues to be beneficial due to tachycardia and hx of palpitations but can also be switched to low dose metoprolol if BP not responsive and other agents are more beneficial.  - start losartan low dose 25 mg daily for now, can titrate up next visit with plan to d/c amlodipine 5 mg for better medication adherence and avoiding polypharmacy   - educated on low sodium die and lifestyle modifications including decrease coffee intake.   - follow up in 4 weeks         Orders:    Lipid panel; Future    Comprehensive metabolic panel; Future    Albumin / creatinine urine ratio; Future    losartan (COZAAR) 25 mg tablet; Take 1 tablet (25 mg total) by mouth daily           History of Present Illness     39-year-old female with past medical history including hypertension and type 1.5 diabetes mellitus presents today for follow-up on hypertension.  Patient was out of medication on previous visit and has restarted a month ago.  Patient reports adherence, currently has no acute complaints.  Patient is aware of BP being not at goal however continues to consume coffee 2-3 cups a day as well as diet rich in carbs.  Patient is agreeable to medication changes.  Denies any hospitalizations or ED visits.       Review of Systems   Constitutional:  Negative for chills, fever and unexpected weight change.  "  HENT:  Negative for congestion.    Eyes:  Negative for visual disturbance.   Respiratory:  Negative for cough and shortness of breath.    Cardiovascular:  Negative for chest pain, palpitations and leg swelling.   Gastrointestinal:  Negative for constipation, diarrhea and vomiting.   Genitourinary:  Negative for dysuria.   Musculoskeletal:  Negative for joint swelling.   Skin:  Negative for rash.   Neurological:  Negative for dizziness.   All other systems reviewed and are negative.         Objective   /98 (BP Location: Left arm, Patient Position: Sitting, Cuff Size: Large)   Pulse (!) 121   Temp (!) 96.7 °F (35.9 °C) (Temporal)   Resp 16   Ht 5' 7\" (1.702 m)   Wt 126 kg (278 lb 3.2 oz)   SpO2 99%   BMI 43.57 kg/m²      Physical Exam  Vitals and nursing note reviewed.   Constitutional:       General: She is not in acute distress.     Appearance: She is well-developed.   HENT:      Head: Normocephalic and atraumatic.   Eyes:      Conjunctiva/sclera: Conjunctivae normal.   Cardiovascular:      Rate and Rhythm: Normal rate and regular rhythm.      Heart sounds: No murmur heard.  Pulmonary:      Effort: Pulmonary effort is normal. No respiratory distress.      Breath sounds: Normal breath sounds.   Abdominal:      Palpations: Abdomen is soft.      Tenderness: There is no abdominal tenderness.   Musculoskeletal:         General: No swelling.      Cervical back: Neck supple.      Right lower leg: No edema.      Left lower leg: No edema.   Skin:     General: Skin is warm and dry.      Capillary Refill: Capillary refill takes less than 2 seconds.   Neurological:      Mental Status: She is alert.   Psychiatric:         Mood and Affect: Mood normal.         "

## 2024-12-06 NOTE — ASSESSMENT & PLAN NOTE
BP Readings from Last 3 Encounters:   12/06/24 140/98   11/15/24 150/88   06/19/24 141/98      Amlodipine 5 mg, labetalol 400 mg BID current meds  Labetalol started while pregnant also hx of palpitations - no longer breastfeeding   BP continues to be elevated  Per chart review patient has hx of proteinuria and would benefit from ACE-I/ ARB agent now tht is no longer breastfeeding  - labetalol continues to be beneficial due to tachycardia and hx of palpitations but can also be switched to low dose metoprolol if BP not responsive and other agents are more beneficial.  - start losartan low dose 25 mg daily for now, can titrate up next visit with plan to d/c amlodipine 5 mg for better medication adherence and avoiding polypharmacy   - educated on low sodium die and lifestyle modifications including decrease coffee intake.   - follow up in 4 weeks         Orders:    Lipid panel; Future    Comprehensive metabolic panel; Future    Albumin / creatinine urine ratio; Future    losartan (COZAAR) 25 mg tablet; Take 1 tablet (25 mg total) by mouth daily

## 2024-12-10 ENCOUNTER — VBI (OUTPATIENT)
Dept: ADMINISTRATIVE | Facility: OTHER | Age: 39
End: 2024-12-10

## 2024-12-10 NOTE — TELEPHONE ENCOUNTER
12/10/24 5:05 PM     Chart reviewed for Hemoglobin A1c was/were submitted to the patient's insurance.     Belle Li MA   [facility]

## 2025-01-02 ENCOUNTER — APPOINTMENT (OUTPATIENT)
Dept: LAB | Facility: CLINIC | Age: 40
End: 2025-01-02
Payer: COMMERCIAL

## 2025-01-02 ENCOUNTER — OFFICE VISIT (OUTPATIENT)
Dept: FAMILY MEDICINE CLINIC | Facility: CLINIC | Age: 40
End: 2025-01-02

## 2025-01-02 VITALS
BODY MASS INDEX: 42.06 KG/M2 | RESPIRATION RATE: 18 BRPM | OXYGEN SATURATION: 99 % | HEIGHT: 67 IN | SYSTOLIC BLOOD PRESSURE: 136 MMHG | DIASTOLIC BLOOD PRESSURE: 90 MMHG | TEMPERATURE: 98.7 F | HEART RATE: 72 BPM | WEIGHT: 268 LBS

## 2025-01-02 DIAGNOSIS — R42 DIZZINESS: Primary | ICD-10-CM

## 2025-01-02 DIAGNOSIS — R42 DIZZINESS: ICD-10-CM

## 2025-01-02 DIAGNOSIS — I10 BENIGN ESSENTIAL HTN: ICD-10-CM

## 2025-01-02 LAB
ALBUMIN SERPL BCG-MCNC: 4.3 G/DL (ref 3.5–5)
ALP SERPL-CCNC: 64 U/L (ref 34–104)
ALT SERPL W P-5'-P-CCNC: 9 U/L (ref 7–52)
ANION GAP SERPL CALCULATED.3IONS-SCNC: 10 MMOL/L (ref 4–13)
AST SERPL W P-5'-P-CCNC: 9 U/L (ref 13–39)
BASOPHILS # BLD AUTO: 0.05 THOUSANDS/ΜL (ref 0–0.1)
BASOPHILS NFR BLD AUTO: 1 % (ref 0–1)
BILIRUB SERPL-MCNC: 0.46 MG/DL (ref 0.2–1)
BUN SERPL-MCNC: 11 MG/DL (ref 5–25)
CALCIUM SERPL-MCNC: 9.2 MG/DL (ref 8.4–10.2)
CHLORIDE SERPL-SCNC: 104 MMOL/L (ref 96–108)
CHOLEST SERPL-MCNC: 200 MG/DL (ref ?–200)
CO2 SERPL-SCNC: 24 MMOL/L (ref 21–32)
CREAT SERPL-MCNC: 0.78 MG/DL (ref 0.6–1.3)
CREAT UR-MCNC: 195.7 MG/DL
EOSINOPHIL # BLD AUTO: 0.04 THOUSAND/ΜL (ref 0–0.61)
EOSINOPHIL NFR BLD AUTO: 1 % (ref 0–6)
ERYTHROCYTE [DISTWIDTH] IN BLOOD BY AUTOMATED COUNT: 11.9 % (ref 11.6–15.1)
GFR SERPL CREATININE-BSD FRML MDRD: 95 ML/MIN/1.73SQ M
GLUCOSE P FAST SERPL-MCNC: 196 MG/DL (ref 65–99)
HCT VFR BLD AUTO: 38.6 % (ref 34.8–46.1)
HDLC SERPL-MCNC: 41 MG/DL
HGB BLD-MCNC: 12.3 G/DL (ref 11.5–15.4)
IMM GRANULOCYTES # BLD AUTO: 0.02 THOUSAND/UL (ref 0–0.2)
IMM GRANULOCYTES NFR BLD AUTO: 0 % (ref 0–2)
LDLC SERPL CALC-MCNC: 144 MG/DL (ref 0–100)
LYMPHOCYTES # BLD AUTO: 2.33 THOUSANDS/ΜL (ref 0.6–4.47)
LYMPHOCYTES NFR BLD AUTO: 30 % (ref 14–44)
MCH RBC QN AUTO: 27.8 PG (ref 26.8–34.3)
MCHC RBC AUTO-ENTMCNC: 31.9 G/DL (ref 31.4–37.4)
MCV RBC AUTO: 87 FL (ref 82–98)
MICROALBUMIN UR-MCNC: 24 MG/L
MICROALBUMIN/CREAT 24H UR: 12 MG/G CREATININE (ref 0–30)
MONOCYTES # BLD AUTO: 0.27 THOUSAND/ΜL (ref 0.17–1.22)
MONOCYTES NFR BLD AUTO: 4 % (ref 4–12)
NEUTROPHILS # BLD AUTO: 5.04 THOUSANDS/ΜL (ref 1.85–7.62)
NEUTS SEG NFR BLD AUTO: 64 % (ref 43–75)
NONHDLC SERPL-MCNC: 159 MG/DL
NRBC BLD AUTO-RTO: 0 /100 WBCS
PLATELET # BLD AUTO: 300 THOUSANDS/UL (ref 149–390)
PMV BLD AUTO: 12.6 FL (ref 8.9–12.7)
POTASSIUM SERPL-SCNC: 3.6 MMOL/L (ref 3.5–5.3)
PROT SERPL-MCNC: 7.7 G/DL (ref 6.4–8.4)
RBC # BLD AUTO: 4.43 MILLION/UL (ref 3.81–5.12)
SODIUM SERPL-SCNC: 138 MMOL/L (ref 135–147)
TRIGL SERPL-MCNC: 74 MG/DL (ref ?–150)
TSH SERPL DL<=0.05 MIU/L-ACNC: 0.76 UIU/ML (ref 0.45–4.5)
WBC # BLD AUTO: 7.75 THOUSAND/UL (ref 4.31–10.16)

## 2025-01-02 PROCEDURE — 82043 UR ALBUMIN QUANTITATIVE: CPT

## 2025-01-02 PROCEDURE — 80053 COMPREHEN METABOLIC PANEL: CPT

## 2025-01-02 PROCEDURE — 3080F DIAST BP >= 90 MM HG: CPT | Performed by: FAMILY MEDICINE

## 2025-01-02 PROCEDURE — 36415 COLL VENOUS BLD VENIPUNCTURE: CPT

## 2025-01-02 PROCEDURE — 80061 LIPID PANEL: CPT

## 2025-01-02 PROCEDURE — 82570 ASSAY OF URINE CREATININE: CPT

## 2025-01-02 PROCEDURE — 99213 OFFICE O/P EST LOW 20 MIN: CPT | Performed by: FAMILY MEDICINE

## 2025-01-02 PROCEDURE — 85025 COMPLETE CBC W/AUTO DIFF WBC: CPT

## 2025-01-02 PROCEDURE — 84443 ASSAY THYROID STIM HORMONE: CPT

## 2025-01-02 PROCEDURE — 3075F SYST BP GE 130 - 139MM HG: CPT | Performed by: FAMILY MEDICINE

## 2025-01-02 RX ORDER — MECLIZINE HCL 12.5 MG 12.5 MG/1
12.5 TABLET ORAL EVERY 8 HOURS PRN
Qty: 30 TABLET | Refills: 0 | Status: SHIPPED | OUTPATIENT
Start: 2025-01-02

## 2025-01-02 NOTE — LETTER
January 2, 2025     Patient: Caridad Coello  YOB: 1985  Date of Visit: 1/2/2025      To Whom it May Concern:    Caridad Coello is under my professional care. Caridad was seen in my office on 1/2/2025. Caridad may return to work on 01/04/2025 .    If you have any questions or concerns, please don't hesitate to call.         Sincerely,          Noa Vasquez MD

## 2025-01-02 NOTE — PROGRESS NOTES
Name: Caridad Coello      : 1985      MRN: [id number]  Encounter Provider: Noa Vasquez MD  Encounter Date: 2025   Encounter department: [facility]  :  Assessment & Plan  Dizziness  3 days duration, denies any trigger for it   Lasting a few seconds and occurring multiple times a day   Patient did have a history of pseudotumour cerebri several years ago and follows with neurology, however reports this symptoms is different. Currently it is not associated with headache, trouble with vision or hearing   [place] carey pix maneuver positive today with horizontal nystagmus  Differential diagnosis includes BPPV vs vestibular neuritis although this is less likely, denies recent illness   Orthostatic vitals negative although patient declines vitals while laying down as it was making her feel nauseous  Will trial meclizine  If symptoms continue will recommend vestibular PT  And consider cardiac work up given patient's continues tachycardia, although once check manually HR normalized      Orders:    CBC and differential; Future    TSH, 3rd generation with Free T4 reflex; Future    meclizine (ANTIVERT) 12.5 MG tablet; Take 1 tablet (12.5 mg total) by mouth every 8 (eight) hours as needed for dizziness or nausea           History of Present Illness   Caridad Coello is a 39 y.o. female who present to the office with complaints of dizziness  Please see A/P for further details   Dizziness  This is a new problem. The current episode started in the past 7 days. The problem occurs intermittently. The problem has been waxing and waning. Associated symptoms include nausea. Pertinent negatives include no fever or headaches. Exacerbated by: changes of movement. She has tried nothing for the symptoms.       Review of Systems   Constitutional:  Negative for fever.   Gastrointestinal:  Positive for nausea.   Neurological:  Positive for dizziness. Negative for headaches.       Objective   BP  "136/90 (BP Location: Right arm, Patient Position: Standing, Cuff Size: Large)   Pulse 72 Comment: rechek done at 11:40am  Temp 98.7 °F (37.1 °C) (Temporal)   Resp 18   Ht 5' 7\" (1.702 m)   Wt 122 kg (268 lb)   LMP  (LMP Unknown)   SpO2 99%   Breastfeeding No   BMI 41.97 kg/m²      Physical Exam  Constitutional:       General: She is not in acute distress.     Appearance: Normal appearance. She is not ill-appearing, toxic-appearing or diaphoretic.   Eyes:      Comments: Positive cem hallpix maneuver with nystasgmus   Cardiovascular:      Rate and Rhythm: Tachycardia present.      Heart sounds: Normal heart sounds. No murmur heard.  Pulmonary:      Effort: Pulmonary effort is normal.      Breath sounds: Normal breath sounds.   Neurological:      General: No focal deficit present.      Mental Status: She is alert and oriented to person, place, and time.         "

## 2025-01-02 NOTE — ASSESSMENT & PLAN NOTE
3 days duration, denies any trigger for it   Lasting a few seconds and occurring multiple times a day   Patient did have a history of pseudotumour cerebri several years ago and follows with neurology, however reports this symptoms is different. Currently it is not associated with headache, trouble with vision or hearing   [place] carey pix maneuver positive today with horizontal nystagmus  Differential diagnosis includes BPPV vs vestibular neuritis although this is less likely, denies recent illness   Orthostatic vitals negative although patient declines vitals while laying down as it was making her feel nauseous  Will trial meclizine  If symptoms continue will recommend vestibular PT  And consider cardiac work up given patient's continues tachycardia, although once check manually HR normalized      Orders:    CBC and differential; Future    TSH, 3rd generation with Free T4 reflex; Future    meclizine (ANTIVERT) 12.5 MG tablet; Take 1 tablet (12.5 mg total) by mouth every 8 (eight) hours as needed for dizziness or nausea

## 2025-01-06 ENCOUNTER — RESULTS FOLLOW-UP (OUTPATIENT)
Dept: FAMILY MEDICINE CLINIC | Facility: CLINIC | Age: 40
End: 2025-01-06

## 2025-01-06 NOTE — RESULT ENCOUNTER NOTE
I called patient to review lab results and to check on how she was doing  Reach voicemail and left a voicemail  The thyroid test is normal  The CBC test is also normal

## 2025-01-15 NOTE — ASSESSMENT & PLAN NOTE
Likely BPPV. Associated horizontal nystagmus bilaterally. [place] hallpix maneuver positive   Patient not interested on medication at this time and wants Vestibular rehab as it worked in the past     Plan:  Referral for vestibular therapy  Will consider brain imaging if symptoms do not improve or worsen  ED precautions given  Follow-up in 4 weeks    Orders:    Ambulatory Referral to Physical Therapy; Future

## 2025-01-15 NOTE — PROGRESS NOTES
Name: Caridad Coello      : 1985      MRN: [id number]  Encounter Provider: Markus Millard MD  Encounter Date: 2025   Encounter department: [facility]  :  Assessment & Plan  Dizziness  Likely BPPV. Associated horizontal nystagmus bilaterally. [place] hallpix maneuver positive   Patient not interested on medication at this time and wants Vestibular rehab as it worked in the past     Plan:  Referral for vestibular therapy  Will consider brain imaging if symptoms do not improve or worsen  ED precautions given  Follow-up in 4 weeks    Orders:    Ambulatory Referral to Physical Therapy; Future    Depressive disorder  Stable.  No SI/HI  On Zoloft 100 mg qd  Patient elected to return to therapy as she usually feels more depressed around the anniversary of death of her sister    Plan:    Orders:    Ambulatory referral to Behavioral Health Services; Future           History of Present Illness      Caridad Coello is a 39 y.o. female who present to the office with complaints of dizziness.  Patient does have a history of idiopathic intracranial hypertension, but reports that symptoms are different this time.  She denies use of antibiotics recently or retinoids. She is on depo provera for contraception. She denies headache, and visual disturbances tinnitus.   [place]-Hallpike maneuver was positive last visit and BPPV was thought to be the most likely diagnosis. Patient asked not to have maneuver repeated today as it triggers her symptoms. She was started on meclizine, but patient did not start medication with concerns of allergies as she has allergy to diphenhydramine (head and neck swelling).  Meclizine and diphenhydramine cross reaction was discussed with pharmacist and they stated that it should not be an issue.     Patient reports that she has had similar episode in the past and Vestibular therapy helped.     TSH and CBC within normal limits.  Patient was seen by cardiologist this  "morning who does not think that her dizziness is due to cardiac issue.         Dizziness  This is a recurrent problem. Episode onset: 2 weeks. Episode frequency: Every time she moves her head fast. The problem has been unchanged. Pertinent negatives include no chest pain, congestion, fever, headaches, nausea, neck pain, rash, sore throat, visual change or vomiting. She has tried nothing for the symptoms.     Review of Systems   Constitutional:  Negative for fever.   HENT:  Negative for congestion, ear discharge, ear pain, sinus pressure, sore throat and tinnitus.    Eyes:  Negative for visual disturbance.   Respiratory:  Negative for shortness of breath.    Cardiovascular:  Negative for chest pain, palpitations and leg swelling.   Gastrointestinal:  Negative for nausea and vomiting.   Endocrine: Negative for cold intolerance, heat intolerance, polydipsia and polyuria.   Genitourinary:  Negative for dysuria and hematuria.   Musculoskeletal:  Negative for neck pain.   Skin:  Negative for rash.   Neurological:  Positive for dizziness. Negative for seizures, syncope and headaches.       Objective   /98 (BP Location: Right arm, Patient Position: Sitting, Cuff Size: Large)   Pulse 90   Temp 98 °F (36.7 °C) (Temporal)   Resp 16   Ht 5' 7\" (1.702 m)   Wt 122 kg (268 lb)   LMP  (LMP Unknown)   SpO2 95%   BMI 41.97 kg/m²      Physical Exam  Constitutional:       General: She is not in acute distress.     Appearance: She is not ill-appearing.   HENT:      Head: Normocephalic and atraumatic.      Right Ear: Tympanic membrane and external ear normal. There is no impacted cerumen.      Left Ear: Tympanic membrane and external ear normal. There is no impacted cerumen.      Nose: Nose normal.      Mouth/Throat:      Mouth: Mucous membranes are moist.      Pharynx: Oropharynx is clear. No oropharyngeal exudate or posterior oropharyngeal erythema.   Eyes:      General: No scleral icterus.     Extraocular Movements:      " Right eye: Nystagmus present.      Left eye: Nystagmus present.      Conjunctiva/sclera: Conjunctivae normal.   Cardiovascular:      Rate and Rhythm: Normal rate and regular rhythm.      Pulses: Normal pulses.      Heart sounds: No murmur heard.     No gallop.   Pulmonary:      Effort: Pulmonary effort is normal. No respiratory distress.      Breath sounds: No wheezing or rhonchi.   Abdominal:      General: Bowel sounds are normal. There is no distension.      Tenderness: There is no abdominal tenderness. There is no guarding.   Musculoskeletal:      Cervical back: Normal range of motion.      Right lower leg: No edema.      Left lower leg: No edema.   Skin:     General: Skin is warm and dry.      Capillary Refill: Capillary refill takes less than 2 seconds.   Neurological:      General: No focal deficit present.      Mental Status: She is alert and oriented to person, place, and time.      Motor: No weakness or pronator drift.      Coordination: Romberg sign negative. Coordination normal. Finger-Nose-Finger Test normal.      Gait: Gait normal.   Psychiatric:         Mood and Affect: Mood normal.         Behavior: Behavior normal.         Thought Content: Thought content does not include homicidal or suicidal ideation.

## 2025-01-16 ENCOUNTER — TELEPHONE (OUTPATIENT)
Dept: FAMILY MEDICINE CLINIC | Facility: CLINIC | Age: 40
End: 2025-01-16

## 2025-01-16 ENCOUNTER — OFFICE VISIT (OUTPATIENT)
Dept: FAMILY MEDICINE CLINIC | Facility: CLINIC | Age: 40
End: 2025-01-16

## 2025-01-16 ENCOUNTER — EVALUATION (OUTPATIENT)
Dept: PHYSICAL THERAPY | Facility: CLINIC | Age: 40
End: 2025-01-16
Payer: COMMERCIAL

## 2025-01-16 VITALS
SYSTOLIC BLOOD PRESSURE: 130 MMHG | RESPIRATION RATE: 16 BRPM | HEIGHT: 67 IN | OXYGEN SATURATION: 95 % | BODY MASS INDEX: 42.06 KG/M2 | TEMPERATURE: 98 F | HEART RATE: 90 BPM | DIASTOLIC BLOOD PRESSURE: 98 MMHG | WEIGHT: 268 LBS

## 2025-01-16 DIAGNOSIS — F32.A DEPRESSIVE DISORDER: ICD-10-CM

## 2025-01-16 DIAGNOSIS — H81.12 BPPV (BENIGN PAROXYSMAL POSITIONAL VERTIGO), LEFT: Primary | ICD-10-CM

## 2025-01-16 DIAGNOSIS — R42 DIZZINESS: Primary | ICD-10-CM

## 2025-01-16 DIAGNOSIS — R42 DIZZINESS: ICD-10-CM

## 2025-01-16 PROCEDURE — 3080F DIAST BP >= 90 MM HG: CPT

## 2025-01-16 PROCEDURE — 97112 NEUROMUSCULAR REEDUCATION: CPT

## 2025-01-16 PROCEDURE — 97162 PT EVAL MOD COMPLEX 30 MIN: CPT

## 2025-01-16 PROCEDURE — 95992 CANALITH REPOSITIONING PROC: CPT

## 2025-01-16 PROCEDURE — 3075F SYST BP GE 130 - 139MM HG: CPT

## 2025-01-16 PROCEDURE — 99213 OFFICE O/P EST LOW 20 MIN: CPT

## 2025-01-16 NOTE — PROGRESS NOTES
PT Evaluation     Today's date: 2025  Patient name: Caridad Coello  : 1985  MRN: [id number]  Referring provider: Dolores Starr DO  Dx:   Encounter Diagnosis     ICD-10-CM    1. BPPV (benign paroxysmal positional vertigo), left  H81.12       2. Dizziness  R42 Ambulatory Referral to Physical Therapy          Start Time: 1315  Stop Time: 1400  Total time in clinic (min): 45 minutes    Assessment  Ms. Caridad Coello is a pleasant 39 year-old female presenting to [facility] [address] [place] [address] [facility] for initial evaluation of c/o vertiginous symptoms with positional changes. She was referred for consultation by Dr. Dolores Starr DO. Their past medical history is significant for prior history of BPPV as well as an episode of increased cerebrospinal pressure which was medically managed. Upon examination today, she presented with upbeating torsional nystagmus with left [place]-Hallpike testing that would be consistent with the presentation of left posterior canal benign paroxysmal positional vertigo. Nystagmus did last more than 30s with positional testing; suspecting cupulolithiasis versus canalithiasis based on responses to x2 L Epley manuevers completed. Will plan [place] maneuver NV and assess subsequent canals BL thereafter. Will benefit from skilled PT care to attempt to abolish positional vertiginous symptoms and improve quality of life.    Understanding of Dx/Px/POC: Good     Prognosis: Good     Goals  Short-term goals (to be achieved in 4 weeks):  1. Asymptomatic with positional testing.  2. No nystagmus with positional testing.     Long-term goals (to be achieved in 8 to 12 weeks with additional goals to be incorporated and progressed appropriately pending patient progress):  TBD based on positional treatment outcomes pending VOR assessment.    Plan  Patient would benefit from: skilled physical therapy  Referral necessary: No  Planned modality interventions:  biofeedback     Planned therapy interventions: manual therapy, balance, neuromuscular re-education, canalith repositioning maneuvers, patient/caregiver education, stretching, strengthening, therapeutic activities, therapeutic exercise, graded exercise, gait training and home exercise program     Frequency: 1-3x/week  Duration in weeks: 8  Plan of Care beginning date: 1/16/25  Plan of Care expiration date: 3/21/25  Treatment plan discussed with: Patient        Subjective Evaluation    History of Present Illness    Has been experiencing bouts of vertigo with subjective nystagmus/room spinning sensation for the last 3 weeks.  Was treated a little more than 10 years prior for positional vertigo with [facility] - required a high frequency of treatments/CRM's to be successful.  Not taking meclizine.  Denies any other neurologic changes or changes to bowel/bladder function.  Denies any changes to vision aside from nystagmus.    Ongoing probem     Quality of life: Fair     Patient Goals  To resolve her positional vertigo/dizziness symptoms.    Pain  No current report of pain.       Treatments  Previous treatment: PT  Current treatment: PT        Objective    Oculomotor:  Smooth pursuits WNL  Saccades WNL  Convergence WNL  Eye cover/uncover WNL    Modified VBI: Negative BL    C/S AROM: WFL all planes    Positional Testing:  Positive L [place]-Hallpike (upbeating torsional nystagmus >30s)             POC Expiration Date: 3/21/25        POC expires Unit limit Auth Expiration date PT/OT/ST + Visit Limit?   3/21/25    12/31/25                                             Visit/Unit Tracking  AUTH Status:  Date  1/16                       BOMN Used  1                         Remaining                                Specialty Daily Treatment Diary  Precautions: NA    1/16/25: L Epley x2     Manuals 1/16/25 IE                                                                   Neuro Re-Ed  Pt education: BPPV pathophysiology, response to CRM,  recurrence rate, vestibular function                                                                                                             Ther Ex                                                                                                                             Ther Activity                                         Gait Training                                         Modalities

## 2025-01-16 NOTE — TELEPHONE ENCOUNTER
Spoke to Medical student following Dr. Millard in regards to patients allergies and prescribing meclizine:    Prochlorperazine is a phenothiazine derivative, which is a class of antipsychotic and antiemetic drugs known to cause allergic reactions in some individuals.     Meclizine, on the other hand, is an antihistamine used primarily for the treatment of vertigo and motion sickness. It belongs to the piperazine class of antihistamines and has a different chemical structure and pharmacological profile compared to phenothiazines    Chlorpheniramine/diphendydramine is a first-generation H1-antihistamine belonging to the alkylamine class, while meclizine is a first-generation H1-antihistamine belonging to the piperazine class.    Likely no cross reactivity at all, if even truly allergic to listed drugs. More likely an allergy listed as an intolerance as ~95% of all allergies listed on EMRs are not correct.     Pharmacist Tracking Tool  Reason For Outreach: Embedded Pharmacist  Demographics:  Intervention Method: Chart Review  Type of Intervention: New  Topics Addressed: Other  Pharmacologic Interventions: Medication Initiation  Non-Pharmacologic Interventions: Chart update, Medication Monitoring, and Medication/Device education  Time:  Direct Patient Care:  0  mins  Care Coordination:  10  mins  Recommendation Recipient: Provider  Outcome: Accepted and Pending/ Follow-up Required    Malachi Wetzel, Roger, BCACP  Ambulatory Care Clinical Pharmacist

## 2025-01-16 NOTE — ASSESSMENT & PLAN NOTE
Stable.  No SI/HI  On Zoloft 100 mg qd  Patient elected to return to therapy as she usually feels more depressed around the anniversary of death of her sister    Plan:    Orders:    Ambulatory referral to Behavioral Health Services; Future

## 2025-01-17 ENCOUNTER — APPOINTMENT (OUTPATIENT)
Dept: PHYSICAL THERAPY | Facility: CLINIC | Age: 40
End: 2025-01-17
Payer: COMMERCIAL

## 2025-01-20 ENCOUNTER — APPOINTMENT (OUTPATIENT)
Dept: PHYSICAL THERAPY | Facility: CLINIC | Age: 40
End: 2025-01-20
Payer: COMMERCIAL

## 2025-02-15 ENCOUNTER — OFFICE VISIT (OUTPATIENT)
Dept: URGENT CARE | Facility: MEDICAL CENTER | Age: 40
End: 2025-02-15
Payer: COMMERCIAL

## 2025-02-15 ENCOUNTER — APPOINTMENT (OUTPATIENT)
Dept: RADIOLOGY | Facility: MEDICAL CENTER | Age: 40
End: 2025-02-15
Payer: COMMERCIAL

## 2025-02-15 VITALS
OXYGEN SATURATION: 99 % | TEMPERATURE: 98.6 F | HEART RATE: 109 BPM | SYSTOLIC BLOOD PRESSURE: 136 MMHG | DIASTOLIC BLOOD PRESSURE: 80 MMHG | RESPIRATION RATE: 18 BRPM

## 2025-02-15 DIAGNOSIS — M79.674 GREAT TOE PAIN, RIGHT: ICD-10-CM

## 2025-02-15 DIAGNOSIS — M79.674 GREAT TOE PAIN, RIGHT: Primary | ICD-10-CM

## 2025-02-15 PROCEDURE — 99214 OFFICE O/P EST MOD 30 MIN: CPT

## 2025-02-15 PROCEDURE — 73660 X-RAY EXAM OF TOE(S): CPT

## 2025-02-15 RX ORDER — LANCETS 30 GAUGE
EACH MISCELLANEOUS
COMMUNITY
Start: 2025-02-15

## 2025-02-15 RX ORDER — DIAZEPAM 5 MG/1
5 TABLET ORAL EVERY 8 HOURS PRN
COMMUNITY
Start: 2025-01-24

## 2025-02-15 RX ORDER — ONDANSETRON 4 MG/1
4 TABLET, ORALLY DISINTEGRATING ORAL EVERY 8 HOURS PRN
COMMUNITY
Start: 2025-01-24

## 2025-02-15 RX ORDER — ACYCLOVIR 400 MG/1
TABLET ORAL
COMMUNITY
Start: 2024-11-15

## 2025-02-15 RX ORDER — LOSARTAN POTASSIUM 50 MG/1
TABLET ORAL
COMMUNITY
Start: 2025-02-12

## 2025-02-15 RX ORDER — PEN NEEDLE, DIABETIC 32 GX 1/4"
NEEDLE, DISPOSABLE MISCELLANEOUS
COMMUNITY
Start: 2024-12-26

## 2025-02-15 NOTE — PATIENT INSTRUCTIONS
Your finalized x-ray results will be available on [facility] when read by the radiologist.  Recommend rest, ice, elevation. Over the counter pain medication as directed on packaging as needed for pain (ex: Tylenol, ibuprofen).    Follow up with PCP in 3-5 days.  Proceed to  ER if symptoms worsen.    If tests are performed, our office will contact you with results only if changes need to made to the care plan discussed with you at the visit. You can review your full results on [facility].    Patient Education     Taking care of bruises   The Basics   Written by the doctors and editors at [facility]   What are bruises? -- Bruises happen when blood vessels under the skin break, but the skin isn't cut. Blood leaks into the tissues under the skin. Bruises start off red in color, and then turn blue or purple. As they heal, bruises can turn green and yellow (figure 1). Most bruises heal in 1 to 2 weeks, but some take longer.  Bruises can happen when people get hurt, fall, or bump themselves. People usually have pain and swelling in the area of the bruise. Sometimes, the swelling happens right away. Other times, the swelling starts 1 or 2 days later.  Some people bruise more easily and get worse bruises. These include people who have conditions that keep the blood from clotting normally and people who take medicines to prevent blood clots.  How are bruises treated? -- A bruise will get better on its own. But to feel better and help your bruise heal, you can:   Put a cold gel pack, bag of ice, or bag of frozen vegetables on the injured area every 1 to 2 hours, for 15 minutes each time. Put a thin towel between the ice (or other cold object) and your skin. Use the ice (or other cold object) for at least 6 hours after your injury. Some people find it helpful to ice longer, even up to 2 days after their injury.   Raise the area, if possible - Raising the area above the level of your heart helps to reduce swelling.   Take  "medicine to reduce the pain and swelling - To treat pain, you can take acetaminophen (sample brand name: Tylenol). To treat pain and swelling, you can take ibuprofen (sample brand names: Advil, Motrin). But people who have certain conditions or take certain medicines should not take ibuprofen. If you are unsure, ask your doctor or nurse if you can take ibuprofen.   Use an elastic bandage - Using an elastic \"compression\" bandage to keep pressure on the area can reduce swelling. Be careful not to wrap the bandage too tightly. For most injuries, you can use the bandage during the first few days of healing, but take it off when you sleep.  If you have another injury in the same area, like a sprained ankle, you can continue to use the elastic bandage as the injury heals.  Do not use heat packs or a heating pad during the first 48 hours after injury. Heat can increase swelling and pain soon after an injury.  Do not stick a needle or other object in your bruise to drain it.  When should I call the doctor or nurse? -- Call your doctor or nurse if:   You get a fever   Your bruise causes your joints to swell   You can't move or walk because of your bruise   You get bruises for no reason or have unusual bleeding, such as from your gums or in your urine  All topics are updated as new evidence becomes available and our peer review process is complete.  This topic retrieved from [employer] on: Feb 26, 2024.  Topic 00323 Version 11.0  Release: 32.2.4 - C32.56  © 2024 UpToDate, Inc. and/or its affiliates. All rights reserved.  figure 1: How bruises heal     This drawing shows how a bruise changes color as it heals. A bruise starts off red in color (as shown in A) and then turns blue or purple (as shown in B). As a bruise heals, it can turn green and yellow (as shown in C).  Graphic 57062 Version 4.0  Consumer Information Use and Disclaimer   Disclaimer: This generalized information is a limited summary of diagnosis, treatment, and/or " medication information. It is not meant to be comprehensive and should be used as a tool to help the user understand and/or assess potential diagnostic and treatment options. It does NOT include all information about conditions, treatments, medications, side effects, or risks that may apply to a specific patient. It is not intended to be medical advice or a substitute for the medical advice, diagnosis, or treatment of a health care provider based on the health care provider's examination and assessment of a patient's specific and unique circumstances. Patients must speak with a health care provider for complete information about their health, medical questions, and treatment options, including any risks or benefits regarding use of medications. This information does not endorse any treatments or medications as safe, effective, or approved for treating a specific patient. UpToDate, Inc. and its affiliates disclaim any warranty or liability relating to this information or the use thereof.The use of this information is governed by the Terms of Use, available at https://www.[employer].com/en/know/clinical-effectiveness-terms. 2024© UpToDate, Inc. and its affiliates and/or licensors. All rights reserved.  Copyright   © 2024 UpToDate, Inc. and/or its affiliates. All rights reserved.

## 2025-02-15 NOTE — PROGRESS NOTES
[facility]        NAME: Caridad Coello is a 39 y.o. female  : 1985    MRN: [id number]  DATE: February 15, 2025  TIME: 12:55 PM    Assessment and Plan   Great toe pain, right [M79.674]  1. Great toe pain, right  XR toe right great min 2 view        Right great toe x-ray: No acute osseous abnormality per preliminary read.  Radiology to determine final read.    Presentation consistent with contusion of toe.  Advised symptomatic treatment.  Discussed PCP follow up if no improvement.    Patient Instructions     Your finalized x-ray results will be available on [facility] when read by the radiologist.  Recommend rest, ice, elevation. Over the counter pain medication as directed on packaging as needed for pain (ex: Tylenol, ibuprofen).    Follow up with PCP in 3-5 days.  Proceed to  ER if symptoms worsen.    If tests are performed, our office will contact you with results only if changes need to made to the care plan discussed with you at the visit. You can review your full results on [facility].    Chief Complaint     Chief Complaint   Patient presents with    Toe Pain     Patient c/o right great toe pain after a dress fell on toe  her toe 2 days ago.         History of Present Illness       Patient presents for evaluation of right great toe pain.  States yesterday around 2 PM while moving furniture she dropped a dresser onto her right great toe.    Toe Pain   The incident occurred 12 to 24 hours ago. The incident occurred at home. The injury mechanism was a direct blow. Pain location: R great toe. The pain is at a severity of 8/10. The pain has been Constant since onset. Associated symptoms include a loss of motion (decreased). Pertinent negatives include no inability to bear weight, loss of sensation, numbness or tingling. The symptoms are aggravated by movement, weight bearing and palpation. Treatments tried: Tylenol. The treatment provided mild relief.       Review of Systems   Review of  Systems   Musculoskeletal:  Positive for arthralgias, gait problem (limping) and joint swelling.   Skin:  Negative for color change and wound.   Neurological:  Negative for tingling and numbness.         Current Medications       Current Outpatient Medications:     BD Pen Needle Micro U/F 32G X 6 MM MISC, INJECT 1 EACH UNDER THE SKIN 4 TIMES DAILY., Disp: , Rfl:     Continuous Glucose  (Dexcom G7 ) INGA, , Disp: , Rfl:     diazepam (VALIUM) 5 mg tablet, Take 5 mg by mouth every 8 (eight) hours as needed, Disp: , Rfl:     Lancets (OneTouch Delica Plus [id number]) MISC, , Disp: , Rfl:     losartan (COZAAR) 50 mg tablet, , Disp: , Rfl:     ondansetron (ZOFRAN-ODT) 4 mg disintegrating tablet, Take 4 mg by mouth every 8 (eight) hours as needed, Disp: , Rfl:     albuterol (PROVENTIL HFA,VENTOLIN HFA) 90 mcg/act inhaler, Inhale 2 puffs every 6 (six) hours as needed for wheezing or shortness of breath, Disp: 6.7 g, Rfl: 0    amLODIPine (NORVASC) 5 mg tablet, Take 1 tablet (5 mg total) by mouth daily, Disp: 90 tablet, Rfl: 1    atorvastatin (LIPITOR) 10 mg tablet, TAKE 1 TABLET BY MOUTH EVERY DAY, Disp: 30 tablet, Rfl: 3    Blood Pressure KIT, Use in the morning, Disp: 1 kit, Rfl: 0    Continuous Glucose Sensor (Dexcom G7 Sensor), Use 1 Device every 10 days, Disp: 9 each, Rfl: 3    glucose blood test strip, Use, Disp: , Rfl:     insulin aspart (NovoLOG FlexPen) 100 UNIT/ML injection pen, Inject 5 Units under the skin 3 (three) times a day with meals, Disp: , Rfl:     insulin glargine (Toujeo Max SoloStar) 300 units/mL CONCENTRATED U-300 injection pen (2-unit dial), Inject 44 Units under the skin daily at bedtime, Disp: , Rfl:     labetalol (NORMODYNE) 200 mg tablet, Take 2 tablets (400 mg total) by mouth 2 (two) times a day, Disp: 180 tablet, Rfl: 3    losartan (COZAAR) 25 mg tablet, Take 1 tablet (25 mg total) by mouth daily, Disp: 90 tablet, Rfl: 0    meclizine (ANTIVERT) 12.5 MG tablet, Take 1 tablet (12.5  mg total) by mouth every 8 (eight) hours as needed for dizziness or nausea, Disp: 30 tablet, Rfl: 0    medroxyPROGESTERone (DEPO-PROVERA) 150 mg/mL injection, Inject 150 mg into a muscle every 3 (three) months, Disp: , Rfl:     NON FORMULARY, Inject 5 Units under the skin 3 (three) times a day before meals Insulin pen needles, Disp: , Rfl:     semaglutide, 0.25 or 0.5 mg/dose, (Ozempic, 0.25 or 0.5 MG/DOSE,) 2 mg/1.5 mL injection pen, Inject 1 mg under the skin every 7 days, Disp: , Rfl:     sertraline (ZOLOFT) 100 mg tablet, Take 100 mg by mouth daily, Disp: , Rfl:     Current Allergies     Allergies as of 02/15/2025 - Reviewed 02/15/2025   Allergen Reaction Noted    Bee venom Anaphylaxis 02/09/2015    Nabumetone Anaphylaxis 02/09/2015    Orange syrup Anaphylaxis 02/21/2015    Prochlorperazine Anaphylaxis 12/29/2003    Chlorpheniramine  01/08/2015    Codeine  01/08/2015    Diphenhydramine  02/21/2015    Pseudoephedrine  01/08/2015    Tetanus toxoid  01/08/2015            The following portions of the patient's history were reviewed and updated as appropriate: allergies, current medications, past family history, past medical history, past social history, past surgical history and problem list.     Past Medical History:   Diagnosis Date    Dental abscess 10/23/2020    Diabetes mellitus (HCC)     H. pylori infection 06/28/2022    Urea breath test positive on 6/24/2022.  Patient symptomatic with epigastric discomfort and GERD symptoms.  Triple therapy started on 6/28/2022. Follow-up testing 1 month after completion of treatment.      Hypokalemia 06/08/2020    Irregular menses 05/19/2022    Pseudotumor cerebri        Past Surgical History:   Procedure Laterality Date    TONSILLECTOMY         No family history on file.      Medications have been verified.        Objective   /80   Pulse (!) 109   Temp 98.6 °F (37 °C)   Resp 18   SpO2 99%        Physical Exam     Physical Exam  Vitals and nursing note reviewed.    Constitutional:       General: She is not in acute distress.     Appearance: Normal appearance. She is not ill-appearing.   Cardiovascular:      Rate and Rhythm: Normal rate and regular rhythm.      Pulses: Normal pulses.      Heart sounds: Normal heart sounds. No murmur heard.  Pulmonary:      Effort: Pulmonary effort is normal. No respiratory distress.      Breath sounds: Normal breath sounds. No stridor. No wheezing, rhonchi or rales.   Musculoskeletal:      Left foot: Decreased range of motion. Normal capillary refill. Swelling, tenderness and bony tenderness present. No deformity. Normal pulse.      Comments: Above findings localized to R great toe   Neurological:      Mental Status: She is alert.

## 2025-03-11 ENCOUNTER — EVALUATION (OUTPATIENT)
Dept: PHYSICAL THERAPY | Facility: CLINIC | Age: 40
End: 2025-03-11
Payer: COMMERCIAL

## 2025-03-11 DIAGNOSIS — R42 VERTIGO: ICD-10-CM

## 2025-03-11 DIAGNOSIS — H81.10 BPPV (BENIGN PAROXYSMAL POSITIONAL VERTIGO), UNSPECIFIED LATERALITY: Primary | ICD-10-CM

## 2025-03-11 PROCEDURE — 97164 PT RE-EVAL EST PLAN CARE: CPT

## 2025-03-11 NOTE — PROGRESS NOTES
PT Re-Evaluation     Today's date: 3/11/2025  Patient name: Caridad Coello  : 1985  MRN: [id number]  Referring provider: Dolores Starr DO  Dx:   Encounter Diagnosis     ICD-10-CM    1. BPPV (benign paroxysmal positional vertigo), unspecified laterality  H81.10       2. Vertigo  R42             Start Time: 0845  Stop Time: 0815  Total time in clinic (min): 1410 minutes    Assessment  Ms. Caridad Coello is a pleasant 39 year-old female returning to [facility] [address] [facility] [place] for reassessment of c/o vertiginous symptoms with positional changes. She was referred for consultation by Dr. Dolores Starr DO. Their past medical history is significant for prior history of BPPV as well as an episode of increased cerebrospinal pressure which was medically managed. Upon examination today, she reports slight improvements in symptoms but still positional in nature. Of note downbeating nystagmus was observed upon oculomotor examination - this was not a presentation observed with chart review of her ED workup and [facility] visit - I will make her neurologist aware - aside from this she notes no other neurologic symptoms and subjectively feels better than she has - it could be characteristic of central involvement. Introduced VORx1 programming as a habituative intervention. She will benefit from a POC extension to continue to provide her with supportive vestibular care. Next session to attempt positional testing with time planned for monitoring symptoms post-test/CRM's based on symptoms with last attempt of manuevers.     Understanding of Dx/Px/POC: Good     Prognosis: Good     Goals  Short-term goals (to be achieved in 4 weeks):  1. Asymptomatic with positional testing.  2. No nystagmus with positional testing.  3. Compliance with VORX1 programming.     Long-term goals (to be achieved in 8 to 12 weeks with additional goals to be incorporated and progressed appropriately  pending patient progress):  TBD based on positional treatment outcomes pending VOR assessment.    Plan  Patient would benefit from: skilled physical therapy  Referral necessary: No  Planned modality interventions: biofeedback     Planned therapy interventions: manual therapy, balance, neuromuscular re-education, canalith repositioning maneuvers, patient/caregiver education, stretching, strengthening, therapeutic activities, therapeutic exercise, graded exercise, gait training and home exercise program     Frequency: 1-3x/week  Duration in weeks: 8  Plan of Care beginning date: 1/16/25  Plan of Care expiration date: 3/21/25  Treatment plan discussed with: Patient        Subjective Evaluation    History of Present Illness    Returns to [facility] PT after several months due to scheduling conflicts. In this time has underwent ED workup and seen [facility] for concomittant issues (chart reviewed). Awaiting MRI interpretation. Reports positional symptoms have slightly improved since IE and initial CRM's.    Experiences symptoms when laying down to L, rolling from R to L, and getting up on L side of bed.    Additionally feels her eyes are moving at rest.       Ongoing probem     Quality of life: Fair     Patient Goals  To resolve her positional vertigo/dizziness symptoms.    Pain  No current report of pain.       Treatments  Previous treatment: PT  Current treatment: PT        Objective    Oculomotor:  Smooth pursuits WNL  3/11/25: Downbeating non-torsional nystagmus at rest  Saccades WNL  Convergence WNL  Eye cover/uncover WNL    Modified VBI: Negative BL    C/S AROM: WFL all planes    Head Impulse 3/11/25: Difficulties with testing efficacy    Positional Testing:  TBA NV             POC Expiration Date: 6/11/25        POC expires Unit limit Auth Expiration date PT/OT/ST + Visit Limit?   6/11/25 12/31/25                                             Visit/Unit Tracking  AUTH Status:  Date  1/16  3/11                      BOMN Used  1  2                       Remaining                                Specialty Daily Treatment Diary  Precautions: NA    1/16/25: L Epley x2     Manuals 1/16/25 IE  3/11/25 RE                                                                 Neuro Re-Ed  Pt education: BPPV pathophysiology, response to CRM, recurrence rate, vestibular function VORx1 seated plain HEP 3x30 reps/daily cues for pacing                                                                                                           Ther Ex                                                                                                                             Ther Activity                                         Gait Training                                         Modalities

## 2025-03-13 ENCOUNTER — APPOINTMENT (OUTPATIENT)
Dept: PHYSICAL THERAPY | Facility: CLINIC | Age: 40
End: 2025-03-13
Payer: COMMERCIAL

## 2025-03-19 ENCOUNTER — APPOINTMENT (OUTPATIENT)
Dept: PHYSICAL THERAPY | Facility: CLINIC | Age: 40
End: 2025-03-19
Payer: COMMERCIAL

## 2025-03-22 ENCOUNTER — OFFICE VISIT (OUTPATIENT)
Dept: URGENT CARE | Facility: MEDICAL CENTER | Age: 40
End: 2025-03-22
Payer: COMMERCIAL

## 2025-03-22 VITALS
SYSTOLIC BLOOD PRESSURE: 142 MMHG | HEART RATE: 119 BPM | DIASTOLIC BLOOD PRESSURE: 91 MMHG | RESPIRATION RATE: 18 BRPM | TEMPERATURE: 97.8 F | OXYGEN SATURATION: 100 %

## 2025-03-22 DIAGNOSIS — R73.9 HYPERGLYCEMIA: Primary | ICD-10-CM

## 2025-03-22 LAB
GLUCOSE SERPL-MCNC: 323 MG/DL (ref 65–140)
SL AMB  POCT GLUCOSE, UA: 2000
SL AMB LEUKOCYTE ESTERASE,UA: ABNORMAL
SL AMB POCT BILIRUBIN,UA: ABNORMAL
SL AMB POCT BLOOD,UA: ABNORMAL
SL AMB POCT CLARITY,UA: CLEAR
SL AMB POCT COLOR,UA: YELLOW
SL AMB POCT KETONES,UA: 80
SL AMB POCT NITRITE,UA: ABNORMAL
SL AMB POCT PH,UA: 5
SL AMB POCT SPECIFIC GRAVITY,UA: 1.02
SL AMB POCT URINE PROTEIN: ABNORMAL
SL AMB POCT UROBILINOGEN: 0.2

## 2025-03-22 PROCEDURE — 82948 REAGENT STRIP/BLOOD GLUCOSE: CPT

## 2025-03-22 PROCEDURE — 81002 URINALYSIS NONAUTO W/O SCOPE: CPT | Performed by: FAMILY MEDICINE

## 2025-03-22 PROCEDURE — 99213 OFFICE O/P EST LOW 20 MIN: CPT | Performed by: FAMILY MEDICINE

## 2025-03-22 RX ORDER — RIZATRIPTAN BENZOATE 10 MG/1
10 TABLET ORAL DAILY PRN
COMMUNITY
Start: 2025-03-17

## 2025-03-22 NOTE — PROGRESS NOTES
"  [facility]        NAME: Caridad Coello is a 39 y.o. female  : 1985    MRN: [id number]  DATE: 2025  TIME: 1:43 PM    Assessment and Plan   Hyperglycemia [R73.9]  1. Hyperglycemia  POCT urine dip            Patient Instructions       Follow up with PCP in 3-5 days.  Proceed to  ER if symptoms worsen.    If tests have been performed at [facility], our office will contact you with results if changes need to be made to the care plan discussed with you at the visit.  You can review your full results on [facility].    Chief Complaint     Chief Complaint   Patient presents with    Hyperglycemia     Patient noted \" feel off this morning \" she noted that this is the same feeling when she had high level of sugar in the past. She does monitor her levels with a dexcom but it been on back order and she has ntot had on in almost 2 weeks.          History of Present Illness       41-year-old female here today because she felt unusual this morning when she woke up I suspect that she has a high blood sugar.  She has a history of type 2 diabetes and currently on NovoLog and semaglutide.  However, she has been unable to check her blood sugar since her Dexcom is out for repairs.  She does admit she has had increased urinary frequency no other complaints offered.        Review of Systems   Review of Systems   Eyes: Negative.    Respiratory: Negative.     Neurological:  Positive for light-headedness.         Current Medications       Current Outpatient Medications:     rizatriptan (MAXALT) 10 mg tablet, Take 10 mg by mouth daily as needed, Disp: , Rfl:     albuterol (PROVENTIL HFA,VENTOLIN HFA) 90 mcg/act inhaler, Inhale 2 puffs every 6 (six) hours as needed for wheezing or shortness of breath, Disp: 6.7 g, Rfl: 0    amLODIPine (NORVASC) 5 mg tablet, Take 1 tablet (5 mg total) by mouth daily, Disp: 90 tablet, Rfl: 1    atorvastatin (LIPITOR) 10 mg tablet, TAKE 1 TABLET BY MOUTH EVERY DAY, Disp: 30 " tablet, Rfl: 3    BD Pen Needle Micro U/F 32G X 6 MM MISC, INJECT 1 EACH UNDER THE SKIN 4 TIMES DAILY., Disp: , Rfl:     Blood Pressure KIT, Use in the morning, Disp: 1 kit, Rfl: 0    Continuous Glucose  (Dexcom G7 ) INGA, , Disp: , Rfl:     Continuous Glucose Sensor (Dexcom G7 Sensor), Use 1 Device every 10 days, Disp: 9 each, Rfl: 3    diazepam (VALIUM) 5 mg tablet, Take 5 mg by mouth every 8 (eight) hours as needed, Disp: , Rfl:     glucose blood test strip, Use, Disp: , Rfl:     insulin aspart (NovoLOG FlexPen) 100 UNIT/ML injection pen, Inject 5 Units under the skin 3 (three) times a day with meals, Disp: , Rfl:     insulin glargine (Toujeo Max SoloStar) 300 units/mL CONCENTRATED U-300 injection pen (2-unit dial), Inject 44 Units under the skin daily at bedtime, Disp: , Rfl:     labetalol (NORMODYNE) 200 mg tablet, Take 2 tablets (400 mg total) by mouth 2 (two) times a day, Disp: 180 tablet, Rfl: 3    Lancets (OneTouch Delica Plus [id number]) MISC, , Disp: , Rfl:     losartan (COZAAR) 25 mg tablet, Take 1 tablet (25 mg total) by mouth daily, Disp: 90 tablet, Rfl: 0    losartan (COZAAR) 50 mg tablet, , Disp: , Rfl:     meclizine (ANTIVERT) 12.5 MG tablet, Take 1 tablet (12.5 mg total) by mouth every 8 (eight) hours as needed for dizziness or nausea, Disp: 30 tablet, Rfl: 0    medroxyPROGESTERone (DEPO-PROVERA) 150 mg/mL injection, Inject 150 mg into a muscle every 3 (three) months, Disp: , Rfl:     NON FORMULARY, Inject 5 Units under the skin 3 (three) times a day before meals Insulin pen needles, Disp: , Rfl:     ondansetron (ZOFRAN-ODT) 4 mg disintegrating tablet, Take 4 mg by mouth every 8 (eight) hours as needed, Disp: , Rfl:     semaglutide, 0.25 or 0.5 mg/dose, (Ozempic, 0.25 or 0.5 MG/DOSE,) 2 mg/1.5 mL injection pen, Inject 1 mg under the skin every 7 days, Disp: , Rfl:     sertraline (ZOLOFT) 100 mg tablet, Take 100 mg by mouth daily, Disp: , Rfl:     Current Allergies     Allergies as of  03/22/2025 - Reviewed 03/22/2025   Allergen Reaction Noted    Bee venom Anaphylaxis 02/09/2015    Nabumetone Anaphylaxis 02/09/2015    Orange syrup Anaphylaxis 02/21/2015    Prochlorperazine Anaphylaxis 12/29/2003    Chlorpheniramine  01/08/2015    Codeine  01/08/2015    Diphenhydramine  02/21/2015    Pseudoephedrine  01/08/2015    Tetanus toxoid  01/08/2015            The following portions of the patient's history were reviewed and updated as appropriate: allergies, current medications, past family history, past medical history, past social history, past surgical history and problem list.     Past Medical History:   Diagnosis Date    Dental abscess 10/23/2020    Diabetes mellitus (HCC)     H. pylori infection 06/28/2022    Urea breath test positive on 6/24/2022.  Patient symptomatic with epigastric discomfort and GERD symptoms.  Triple therapy started on 6/28/2022. Follow-up testing 1 month after completion of treatment.      Hypokalemia 06/08/2020    Irregular menses 05/19/2022    Pseudotumor cerebri        Past Surgical History:   Procedure Laterality Date    TONSILLECTOMY         No family history on file.      Medications have been verified.        Objective   /91   Pulse (!) 119   Temp 97.8 °F (36.6 °C)   Resp 18   SpO2 100%   No LMP recorded. Patient has had an injection.       Physical Exam     Physical Exam  Nursing note reviewed.   Constitutional:       Appearance: Normal appearance.   Eyes:      Extraocular Movements: Extraocular movements intact.      Pupils: Pupils are equal, round, and reactive to light.   Cardiovascular:      Rate and Rhythm: Normal rate.      Pulses: Normal pulses.   Neurological:      General: No focal deficit present.      Mental Status: She is alert and oriented to person, place, and time.

## 2025-03-22 NOTE — PATIENT INSTRUCTIONS
"Physical exam is unremarkable.  Random glucose fingerstick was 323.  Urine dip reveals glucose over 2088 ketone otherwise unremarkable urine dip.  There is no signs of DKA at the present time.  Patient is lethargic.  However she has not administered her NovoLog as usual today.  Patient has a sliding scale that her endocrinologist had given her in the past.  However if she develops any worsening symptoms she should go immediately to the emergency room.  She expressed understanding.    Patient Education     Diabetic ketoacidosis   The Basics   Written by the doctors and editors at [facility]   What is diabetic ketoacidosis? -- Diabetic ketoacidosis (\"DKA\") is a serious problem that can happen to people with diabetes. It happens when chemicals called \"ketones\" build up in the blood.  DKA can affect people with either type 1 or type 2 diabetes, but it is more likely to affect people with type 1.  Normally, the body breaks down sugar as a source of energy. For this to happen, sugar gets into cells with the help of a hormone called \"insulin.\" If there is not enough insulin, or if the body stops responding to insulin, sugar builds up in the blood. That is what happens when a person has diabetes.  If sugar cannot get into the cells, the body is unable to use it for energy. This is how DKA can happen:   Blood sugar levels continue to increase.   The body starts burning fat for energy instead.   Burning fat can cause the body to make too many ketones.   Ketones build up, making the level of acid in the blood too high.  DKA is an emergency and needs treatment right away.  What causes DKA? -- People can get DKA for a few reasons:   They do not know they have diabetes so they aren't taking insulin, and their body starts breaking down fat.   They have a major illness or health problem, such as a heart attack or infection.   They take certain medicines or illegal drugs.   They don't take their insulin as directed.   Their insulin " "pump does not work correctly.  What are the symptoms of DKA? -- The symptoms can include:   Feeling very thirsty and drinking a lot   Urinating a lot, including at night   Nausea or vomiting   Belly pain   Feeling tired or having trouble thinking clearly   Having breath that smells sweet or fruity   Weight loss  Should I see a doctor or nurse? -- See your doctor or nurse right away if you have the symptoms listed above. Also, see your doctor or nurse if your blood sugar levels keep being higher than they should be. They might need to adjust your insulin dose.  Is there a test for DKA? -- Yes. If the doctor or nurse thinks you have DKA, they will order several blood tests, including tests to check your blood sugar and ketone levels. They will also check your urine for ketones. These tests can show whether you have DKA.  Because DKA can cause problems with the heart, you might also need an electrocardiogram. This is a test that measures the electrical activity in the heart.  Your doctor or nurse will also talk to you about checking your urine for ketones at home. If you have type 1 diabetes, you might need to do this in certain situations, such as:   If your blood sugar gets above a certain level   If you are sick   If you start having symptoms of DKA  You can buy urine test strips in a pharmacy or online. Some people use a meter to check for ketones in their blood instead of testing their urine.  Your doctor or nurse will tell you what to do if your ketone level is high.  How is DKA treated? -- Treatment is usually done at the hospital. This can include:   Fluids and electrolytes - During DKA, the body loses a lot of fluids. It also loses \"electrolytes.\" These are chemicals in the body, such as sodium and potassium, that keep cells working normally. As part of treatment, doctors must replace lost fluids and electrolytes.   Insulin - When the body has enough insulin, it can use sugar as fuel and it does not need to " "break down fat.  These treatments are usually given through a thin tube that goes into a vein, called an \"IV.\"  During treatment, doctors will do regular tests to make sure you are improving. These include:   Blood sugar - This will be checked every hour, at least until it improves.   Other blood tests - These will be done about every 2 to 4 hours. They include tests to check your electrolyte levels and how your kidneys are working. One of the electrolyte tests lets the doctor monitor the level of acid in your blood.  Your doctor will also calculate something called the \"anion gap.\" The electrolytes in your body have an electrical charge. The anion gap is the difference between the number of \"positively charged\" and \"negatively charged\" electrolytes. The anion gap is usually high when there are a lot of ketones in your blood. When it returns to normal, it is a good sign that you are getting better.  Can DKA be prevented? -- You can lower your chances of getting DKA if you:   Take your insulin exactly as instructed.   Measure your blood sugar often to make sure that it is not too high or too low.   Check your ketones at home, if your doctor told you to do this. Make sure that you know how and when to test, and what to do if your level is high.   Ask your doctor about \"sick-day rules.\" If you have an illness like a cold or the flu, your blood sugar might go up and down more than usual. This means that you need to check your blood sugar more often when you are sick. Your doctor or nurse can tell you how to adjust your insulin dose based on your blood sugar levels.   If you are thirsty, drink fluids that do not have sugar. Sugary drinks, like juice, will increase your blood sugar more.  All topics are updated as new evidence becomes available and our peer review process is complete.  This topic retrieved from [employer] on: Feb 26, 2024.  Topic 91178 Version 12.0  Release: 32.2.4 - C32.56  © 2024 UpToDate, Inc. and/or its " affiliates. All rights reserved.  Consumer Information Use and Disclaimer   Disclaimer: This generalized information is a limited summary of diagnosis, treatment, and/or medication information. It is not meant to be comprehensive and should be used as a tool to help the user understand and/or assess potential diagnostic and treatment options. It does NOT include all information about conditions, treatments, medications, side effects, or risks that may apply to a specific patient. It is not intended to be medical advice or a substitute for the medical advice, diagnosis, or treatment of a health care provider based on the health care provider's examination and assessment of a patient's specific and unique circumstances. Patients must speak with a health care provider for complete information about their health, medical questions, and treatment options, including any risks or benefits regarding use of medications. This information does not endorse any treatments or medications as safe, effective, or approved for treating a specific patient. UpToDate, Inc. and its affiliates disclaim any warranty or liability relating to this information or the use thereof.The use of this information is governed by the Terms of Use, available at https://www.woltersMindMixeruwer.com/en/know/clinical-effectiveness-terms. 2024© UpToDate, Inc. and its affiliates and/or licensors. All rights reserved.  Copyright   © 2024 UpToDate, Inc. and/or its affiliates. All rights reserved.

## 2025-03-24 DIAGNOSIS — I10 BENIGN ESSENTIAL HTN: ICD-10-CM

## 2025-03-25 ENCOUNTER — OFFICE VISIT (OUTPATIENT)
Dept: PHYSICAL THERAPY | Facility: CLINIC | Age: 40
End: 2025-03-25
Payer: COMMERCIAL

## 2025-03-25 DIAGNOSIS — H81.10 BPPV (BENIGN PAROXYSMAL POSITIONAL VERTIGO), UNSPECIFIED LATERALITY: Primary | ICD-10-CM

## 2025-03-25 DIAGNOSIS — R42 VERTIGO: ICD-10-CM

## 2025-03-25 PROCEDURE — 97112 NEUROMUSCULAR REEDUCATION: CPT

## 2025-03-25 PROCEDURE — 95992 CANALITH REPOSITIONING PROC: CPT

## 2025-03-25 RX ORDER — LISINOPRIL AND HYDROCHLOROTHIAZIDE 20; 25 MG/1; MG/1
1 TABLET ORAL DAILY
Qty: 90 TABLET | Refills: 0 | OUTPATIENT
Start: 2025-03-25

## 2025-03-25 NOTE — PROGRESS NOTES
Daily Note     Today's date: 3/25/2025  Patient name: Caridad Coello  : 1985  MRN: [id number]  Referring provider: Dolores Starr DO  Dx:   Encounter Diagnosis     ICD-10-CM    1. BPPV (benign paroxysmal positional vertigo), unspecified laterality  H81.10       2. Vertigo  R42           Start Time: 0930  Stop Time: 1015  Total time in clinic (min): 45 minutes    Subjective:   [facility] ED yesterday for vertigo - was given IV fluids - did not have imaging.  Saw [facility] 3/17 - she reviewed her imaging with them - she says their focus is still on her BPPV.  Agreeable to positional assessment/CRM's today.      Objective: See treatment diary below      Assessment: Down beating nystagmus present while sitting. Trialed L [place]-Hallpike but observed a down beating/right nystagmus lasting >2 minutes. Did not complete L Epley CRM. Assessed R and L roll tests - geotropic BL with sensations of stronger symptoms reported on L versus R. Trialed completion of L Universal CRM - subjectively felt calmer s/p. Will consider trial of L modfied Gufoni followed by L Lula next session with additional considerations of L Ysabel CRM.        Plan: Continue per plan of care.  Progress treatment as tolerated.       POC Expiration Date: 25        POC expires Unit limit Auth Expiration date PT/OT/ST + Visit Limit?   25                                             Visit/Unit Tracking  AUTH Status:  Date  1/16  3/11 3/25                   BOMN Used  1  2  3                     Remaining                                Specialty Daily Treatment Diary  Precautions: NA    25: L Epley x2       Manuals 25 IE  3/11/25 RE  3/25                                                               Neuro Re-Ed  Pt education: BPPV pathophysiology, response to CRM, recurrence rate, vestibular function VORx1 seated plain HEP 3x30 reps/daily cues for pacing Positional testing and CRM's described in assessment, subsequent patient  education regarding positional vertigo with peripheral involvement versus central signs x25 min                                                                                                         Ther Ex                                                                                                                             Ther Activity                                         Gait Training                                         Modalities

## 2025-03-27 ENCOUNTER — OFFICE VISIT (OUTPATIENT)
Dept: PHYSICAL THERAPY | Facility: CLINIC | Age: 40
End: 2025-03-27
Payer: COMMERCIAL

## 2025-03-27 DIAGNOSIS — R42 VERTIGO: ICD-10-CM

## 2025-03-27 DIAGNOSIS — H81.10 BPPV (BENIGN PAROXYSMAL POSITIONAL VERTIGO), UNSPECIFIED LATERALITY: Primary | ICD-10-CM

## 2025-03-27 PROCEDURE — 95992 CANALITH REPOSITIONING PROC: CPT

## 2025-03-27 PROCEDURE — 97112 NEUROMUSCULAR REEDUCATION: CPT

## 2025-03-27 NOTE — PROGRESS NOTES
Daily Note     Today's date: 3/27/2025  Patient name: Caridad Coello  : 1985  MRN: [id number]  Referring provider: Dolores Starr DO  Dx:   Encounter Diagnosis     ICD-10-CM    1. BPPV (benign paroxysmal positional vertigo), unspecified laterality  H81.10       2. Vertigo  R42           Start Time: 1015  Stop Time: 1045  Total time in clinic (min): 30 minutes    Subjective: Still experiencing vertiginous symptoms when rolling to get OOB on L side in the morning.       Objective: See treatment diary below    1) L Gufoni (on R side, down at floor) x2 min/position  2) L Modified Gufoni (on L side, looking up) x2 min/position  3) L Gufoni    Assessment: Focused on treatment of L geotropic nystagmus observed with assessment. Will consider L Ysabel Maneuver next session pending symptoms over next several days. Still presenting with down beating non-torsional nystagmus with resting gaze.      Plan: Continue per plan of care.  Progress treatment as tolerated.       POC Expiration Date: 25        POC expires Unit limit Auth Expiration date PT/OT/ST + Visit Limit?   25                                             Visit/Unit Tracking  AUTH Status:  Date  1/16  3/11 3/25                   BOMN Used  1  2  3                     Remaining                                Specialty Daily Treatment Diary  Precautions: NA    25: L Epley x2       Manuals 25 IE  3/11/25 RE  3/25  3/27/25                                                             Neuro Re-Ed  Pt education: BPPV pathophysiology, response to CRM, recurrence rate, vestibular function VORx1 seated plain HEP 3x30 reps/daily cues for pacing Positional testing and CRM's described in assessment, subsequent patient education regarding positional vertigo with peripheral involvement versus central signs x25 min  Positional testing/CRM's as above/ongoing assessment and POC mgmt x15 min                                                                                                        Ther Ex                                                                                                                             Ther Activity                                         Gait Training                                         Modalities

## 2025-04-02 ENCOUNTER — APPOINTMENT (OUTPATIENT)
Dept: PHYSICAL THERAPY | Facility: CLINIC | Age: 40
End: 2025-04-02
Payer: COMMERCIAL

## 2025-04-02 NOTE — PROGRESS NOTES
Daily Note     Today's date: 2025  Patient name: Caridad Coello  : 1985  MRN: [id number]  Referring provider: Dolores Starr,   Dx:   No diagnosis found.                 Subjective: Still experiencing vertiginous symptoms when rolling to get OOB on L side in the morning.       Objective: See treatment diary below    1) L Gufoni (on R side, down at floor) x2 min/position  2) L Modified Gufoni (on L side, looking up) x2 min/position  3) L Gufoni    Assessment: Focused on treatment of L geotropic nystagmus observed with assessment. Will consider L Ysabel Maneuver next session pending symptoms over next several days. Still presenting with down beating non-torsional nystagmus with resting gaze.      Plan: Continue per plan of care.  Progress treatment as tolerated.       POC Expiration Date: 25        POC expires Unit limit Auth Expiration date PT/OT/ST + Visit Limit?   25                                             Visit/Unit Tracking  AUTH Status:  Date  1/16  3/11 3/25                   BOMN Used  1  2  3                     Remaining                                Specialty Daily Treatment Diary  Precautions: NA    25: L Epley x2       Manuals 25 IE  3/11/25 RE  3/25  3/27/25                                                             Neuro Re-Ed  Pt education: BPPV pathophysiology, response to CRM, recurrence rate, vestibular function VORx1 seated plain HEP 3x30 reps/daily cues for pacing Positional testing and CRM's described in assessment, subsequent patient education regarding positional vertigo with peripheral involvement versus central signs x25 min  Positional testing/CRM's as above/ongoing assessment and POC mgmt x15 min                                                                                                       Ther Ex                                                                                                                             Ther Activity                                          Gait Training                                         Modalities

## 2025-04-04 ENCOUNTER — OFFICE VISIT (OUTPATIENT)
Dept: PHYSICAL THERAPY | Facility: CLINIC | Age: 40
End: 2025-04-04
Payer: COMMERCIAL

## 2025-04-04 DIAGNOSIS — R42 VERTIGO: ICD-10-CM

## 2025-04-04 DIAGNOSIS — H81.10 BPPV (BENIGN PAROXYSMAL POSITIONAL VERTIGO), UNSPECIFIED LATERALITY: Primary | ICD-10-CM

## 2025-04-04 PROCEDURE — 97112 NEUROMUSCULAR REEDUCATION: CPT

## 2025-04-04 PROCEDURE — 95992 CANALITH REPOSITIONING PROC: CPT

## 2025-04-04 NOTE — PROGRESS NOTES
Daily Note     Today's date: 2025  Patient name: Caridad Coello  : 1985  MRN: [id number]  Referring provider: Dolores Starr DO  Dx:   Encounter Diagnosis     ICD-10-CM    1. BPPV (benign paroxysmal positional vertigo), unspecified laterality  H81.10       2. Vertigo  R42             Start Time: 0930  Stop Time: 1005  Total time in clinic (min): 35 minutes    Subjective: Has been experiencing less symptoms since last session.      Objective: See treatment diary below    1) HEATHER Grady (ageo nystagmus noted)     Assessment: Completed HEATHER Grady Maneuver based on prior session findings. Will reassess again at next visit. Overall subjectively continues to feel like her positional symptoms are improving.      Plan: Continue per plan of care.  Progress treatment as tolerated.       POC Expiration Date: 25        POC expires Unit limit Auth Expiration date PT/OT/ST + Visit Limit?   25                                             Visit/Unit Tracking  AUTH Status:  Date  1/16  3/11 3/25  4/4                 BOMN Used  1  2  3  4                   Remaining                                Specialty Daily Treatment Diary  Precautions: NA    25: L Epley x2       Manuals 25 IE  3/11/25 RE  3/25  3/27/25  4/4/25                                                           Neuro Re-Ed  Pt education: BPPV pathophysiology, response to CRM, recurrence rate, vestibular function VORx1 seated plain HEP 3x30 reps/daily cues for pacing Positional testing and CRM's described in assessment, subsequent patient education regarding positional vertigo with peripheral involvement versus central signs x25 min  Positional testing/CRM's as above/ongoing assessment and POC mgmt x15 min  Positional testing, HEATHER Grady Manuever                                                                                                     Ther Ex                                                                                                                              Ther Activity                                         Gait Training                                         Modalities

## 2025-04-14 ENCOUNTER — APPOINTMENT (OUTPATIENT)
Dept: PHYSICAL THERAPY | Facility: CLINIC | Age: 40
End: 2025-04-14
Payer: COMMERCIAL

## 2025-04-21 ENCOUNTER — APPOINTMENT (OUTPATIENT)
Dept: PHYSICAL THERAPY | Facility: CLINIC | Age: 40
End: 2025-04-21
Payer: COMMERCIAL

## 2025-05-03 ENCOUNTER — HOSPITAL ENCOUNTER (EMERGENCY)
Facility: HOSPITAL | Age: 40
Discharge: HOME | End: 2025-05-03
Attending: EMERGENCY MEDICINE
Payer: COMMERCIAL

## 2025-05-03 VITALS
RESPIRATION RATE: 20 BRPM | BODY MASS INDEX: 38.85 KG/M2 | WEIGHT: 248.02 LBS | OXYGEN SATURATION: 100 % | SYSTOLIC BLOOD PRESSURE: 172 MMHG | TEMPERATURE: 98.2 F | DIASTOLIC BLOOD PRESSURE: 111 MMHG | HEART RATE: 94 BPM

## 2025-05-03 DIAGNOSIS — H02.842 SWELLING OF RIGHT LOWER EYELID: Primary | ICD-10-CM

## 2025-05-03 PROCEDURE — 99284 EMERGENCY DEPT VISIT MOD MDM: CPT

## 2025-05-03 PROCEDURE — 99282 EMERGENCY DEPT VISIT SF MDM: CPT

## 2025-05-03 RX ADMIN — AMOXICILLIN AND CLAVULANATE POTASSIUM 1 TABLET: 875; 125 TABLET, FILM COATED ORAL at 21:27

## 2025-05-04 NOTE — DISCHARGE INSTRUCTIONS
Please take Augmentin twice daily for the next 7 days.  You were given 1 dose here in the ER.  You may try warm compresses on the area.  Please follow-up with ophthalmology if swelling does not improve.  Return to ED with worsening swelling, eye pain, fevers, chills or any new or worsening symptoms.

## 2025-05-04 NOTE — ED PROVIDER NOTES
Time reflects when diagnosis was documented in both MDM as applicable and the Disposition within this note       Time User Action Codes Description Comment    5/3/2025  9:13 PM Lai, Nevin Add [H02.842] Swelling of right lower eyelid           ED Disposition       ED Disposition   Discharge    Condition   Stable    Date/Time   Sat May 3, 2025  9:12 PM    Comment   Caridad Coello discharge to home/self care.                   Assessment & Plan       Medical Decision Making  39-year-old female presenting for evaluation of right lower eyelid swelling for 2 days.  Moderate amount of lower eyelid swelling on exam, extraocular eye motions intact, no entrapment.  Differential diagnosis to include but not limited to: Hordeolum, periorbital cellulitis, no signs of conjunctivitis, corneal abrasion.  Suspect hordeolum but due to degree of lower eyelid swelling will place patient on Augmentin for possible periorbital cellulitis.  Patient given ophthalmology information for follow-up.  Return precautions provided, patient discharged home to self-care.    Risk  Prescription drug management.             Medications   amoxicillin-clavulanate (AUGMENTIN) 875-125 mg per tablet 1 tablet (1 tablet Oral Given 5/3/25 2127)       ED Risk Strat Scores                    No data recorded        SBIRT 20yo+      Flowsheet Row Most Recent Value   Initial Alcohol Screen: US AUDIT-C     1. How often do you have a drink containing alcohol? 0 Filed at: 05/03/2025 1916   2. How many drinks containing alcohol do you have on a typical day you are drinking?  0 Filed at: 05/03/2025 1916   3b. FEMALE Any Age, or MALE 65+: How often do you have 4 or more drinks on one occassion? 0 Filed at: 05/03/2025 1916   Audit-C Score 0 Filed at: 05/03/2025 1916   CLARIBEL: How many times in the past year have you...    Used an illegal drug or used a prescription medication for non-medical reasons? Never Filed at: 05/03/2025 1916                            History of  Present Illness       Chief Complaint   Patient presents with    Eye Swelling     Pt reports swelling, itchiness and pain on her right eye that started yesterday. No meds PTA.        Past Medical History:   Diagnosis Date    Dental abscess 10/23/2020    Diabetes mellitus (HCC)     H. pylori infection 06/28/2022    Urea breath test positive on 6/24/2022.  Patient symptomatic with epigastric discomfort and GERD symptoms.  Triple therapy started on 6/28/2022. Follow-up testing 1 month after completion of treatment.      Hypokalemia 06/08/2020    Irregular menses 05/19/2022    Pseudotumor cerebri       Past Surgical History:   Procedure Laterality Date    TONSILLECTOMY        History reviewed. No pertinent family history.   Social History     Tobacco Use    Smoking status: Every Day     Current packs/day: 0.25     Types: Cigarettes     Passive exposure: Current    Smokeless tobacco: Never    Tobacco comments:     pt quit 27 days ago   Vaping Use    Vaping status: Never Used   Substance Use Topics    Alcohol use: No    Drug use: No      E-Cigarette/Vaping    E-Cigarette Use Never User       E-Cigarette/Vaping Substances    Nicotine No     THC No     CBD No     Flavoring No     Other No     Unknown No       I have reviewed and agree with the history as documented.     Patient is a 39-year-old female presenting for evaluation of right eye lid swelling.  Patient states that she started having eyelid swelling yesterday, initially appeared like a stye, but now having worsening swelling since waking up this morning.  States it is sore, but denies any itching, pain, or drainage from the area.  Does have a history of nystagmus but denies any denies any fever, chills, or URI symptoms.          Review of Systems   Constitutional:  Negative for chills and fever.   HENT:  Positive for facial swelling (Eye swelling). Negative for congestion.    Eyes:  Negative for pain, discharge, itching and visual disturbance.   Respiratory:   Negative for cough and shortness of breath.    Cardiovascular:  Negative for chest pain and palpitations.   Gastrointestinal:  Negative for abdominal pain.   Skin:  Negative for rash and wound.   All other systems reviewed and are negative.          Objective       ED Triage Vitals [05/03/25 1915]   Temperature Pulse Blood Pressure Respirations SpO2 Patient Position - Orthostatic VS   98.2 °F (36.8 °C) 94 (!) 172/111 20 100 % --      Temp Source Heart Rate Source BP Location FiO2 (%) Pain Score    Oral Monitor -- -- --      Vitals      Date and Time Temp Pulse SpO2 Resp BP Pain Score FACES Pain Rating User   05/03/25 1915 98.2 °F (36.8 °C) 94 100 % 20 172/111 pt did not take her BP meds today. -- -- MN            Physical Exam  Vitals and nursing note reviewed.   Constitutional:       General: She is not in acute distress.     Appearance: She is well-developed.   HENT:      Head: Normocephalic and atraumatic.   Eyes:      Extraocular Movements: Extraocular movements intact.      Right eye: Nystagmus present.      Left eye: Nystagmus present.      Conjunctiva/sclera: Conjunctivae normal.      Pupils: Pupils are equal, round, and reactive to light.      Comments: Swelling of right lower eyelid.  No discharge noted.   Cardiovascular:      Rate and Rhythm: Normal rate and regular rhythm.      Heart sounds: No murmur heard.  Pulmonary:      Effort: Pulmonary effort is normal. No respiratory distress.      Breath sounds: Normal breath sounds.   Abdominal:      Palpations: Abdomen is soft.      Tenderness: There is no abdominal tenderness.   Musculoskeletal:         General: No swelling.      Cervical back: Neck supple.   Skin:     General: Skin is warm and dry.      Capillary Refill: Capillary refill takes less than 2 seconds.   Neurological:      Mental Status: She is alert.   Psychiatric:         Mood and Affect: Mood normal.         Results Reviewed       None            No orders to display       Procedures    ED  Medication and Procedure Management   Prior to Admission Medications   Prescriptions Last Dose Informant Patient Reported? Taking?   BD Pen Needle Micro U/F 32G X 6 MM MISC   Yes No   Sig: INJECT 1 EACH UNDER THE SKIN 4 TIMES DAILY.   Blood Pressure KIT   No No   Sig: Use in the morning   Continuous Glucose  (Dexcom G7 ) INGA   Yes No   Continuous Glucose Sensor (Dexcom G7 Sensor)   No No   Sig: Use 1 Device every 10 days   Lancets (OneTouch Delica Plus [id number]) MISC   Yes No   NON FORMULARY   Yes No   Sig: Inject 5 Units under the skin 3 (three) times a day before meals Insulin pen needles   albuterol (PROVENTIL HFA,VENTOLIN HFA) 90 mcg/act inhaler   No No   Sig: Inhale 2 puffs every 6 (six) hours as needed for wheezing or shortness of breath   amLODIPine (NORVASC) 5 mg tablet   No No   Sig: Take 1 tablet (5 mg total) by mouth daily   atorvastatin (LIPITOR) 10 mg tablet   No No   Sig: TAKE 1 TABLET BY MOUTH EVERY DAY   diazepam (VALIUM) 5 mg tablet   Yes No   Sig: Take 5 mg by mouth every 8 (eight) hours as needed   glucose blood test strip   Yes No   Sig: Use   insulin aspart (NovoLOG FlexPen) 100 UNIT/ML injection pen   Yes No   Sig: Inject 5 Units under the skin 3 (three) times a day with meals   insulin glargine (Toujeo Max SoloStar) 300 units/mL CONCENTRATED U-300 injection pen (2-unit dial)   Yes No   Sig: Inject 44 Units under the skin daily at bedtime   labetalol (NORMODYNE) 200 mg tablet   No No   Sig: Take 2 tablets (400 mg total) by mouth 2 (two) times a day   losartan (COZAAR) 25 mg tablet   No No   Sig: Take 1 tablet (25 mg total) by mouth daily   losartan (COZAAR) 50 mg tablet   Yes No   meclizine (ANTIVERT) 12.5 MG tablet   No No   Sig: Take 1 tablet (12.5 mg total) by mouth every 8 (eight) hours as needed for dizziness or nausea   medroxyPROGESTERone (DEPO-PROVERA) 150 mg/mL injection   Yes No   Sig: Inject 150 mg into a muscle every 3 (three) months   ondansetron (ZOFRAN-ODT) 4 mg  disintegrating tablet   Yes No   Sig: Take 4 mg by mouth every 8 (eight) hours as needed   rizatriptan (MAXALT) 10 mg tablet   Yes No   Sig: Take 10 mg by mouth daily as needed   semaglutide, 0.25 or 0.5 mg/dose, (Ozempic, 0.25 or 0.5 MG/DOSE,) 2 mg/1.5 mL injection pen   Yes No   Sig: Inject 1 mg under the skin every 7 days   sertraline (ZOLOFT) 100 mg tablet   Yes No   Sig: Take 100 mg by mouth daily      Facility-Administered Medications: None     Discharge Medication List as of 5/3/2025  9:14 PM        CONTINUE these medications which have NOT CHANGED    Details   albuterol (PROVENTIL HFA,VENTOLIN HFA) 90 mcg/act inhaler Inhale 2 puffs every 6 (six) hours as needed for wheezing or shortness of breath, Starting Thu 4/7/2022, Normal      amLODIPine (NORVASC) 5 mg tablet Take 1 tablet (5 mg total) by mouth daily, Starting Fri 11/15/2024, Until Wed 5/14/2025, Normal      atorvastatin (LIPITOR) 10 mg tablet TAKE 1 TABLET BY MOUTH EVERY DAY, Starting Tue 10/24/2023, Normal      BD Pen Needle Micro U/F 32G X 6 MM MISC INJECT 1 EACH UNDER THE SKIN 4 TIMES DAILY., Historical Med      Blood Pressure KIT Use in the morning, Starting Mon 4/22/2024, Normal      Continuous Glucose  (Dexcom G7 ) INGA Historical Med      Continuous Glucose Sensor (Dexcom G7 Sensor) Use 1 Device every 10 days, Starting Fri 11/15/2024, Normal      diazepam (VALIUM) 5 mg tablet Take 5 mg by mouth every 8 (eight) hours as needed, Starting Fri 1/24/2025, Historical Med      glucose blood test strip Use, Starting Mon 1/29/2024, Historical Med      insulin aspart (NovoLOG FlexPen) 100 UNIT/ML injection pen Inject 5 Units under the skin 3 (three) times a day with meals, Starting Sun 10/6/2024, Historical Med      insulin glargine (Toujeo Max SoloStar) 300 units/mL CONCENTRATED U-300 injection pen (2-unit dial) Inject 44 Units under the skin daily at bedtime, Starting Mon 9/9/2024, Historical Med      labetalol (NORMODYNE) 200 mg tablet  Take 2 tablets (400 mg total) by mouth 2 (two) times a day, Starting Fri 11/15/2024, Until Wed 5/14/2025, Normal      Lancets (OneTouch Delica Plus [id number]) MISC Historical Med      !! losartan (COZAAR) 25 mg tablet Take 1 tablet (25 mg total) by mouth daily, Starting Fri 12/6/2024, Normal      !! losartan (COZAAR) 50 mg tablet Historical Med      meclizine (ANTIVERT) 12.5 MG tablet Take 1 tablet (12.5 mg total) by mouth every 8 (eight) hours as needed for dizziness or nausea, Starting Thu 1/2/2025, Normal      medroxyPROGESTERone (DEPO-PROVERA) 150 mg/mL injection Inject 150 mg into a muscle every 3 (three) months, Starting Tue 2/27/2024, Historical Med      NON FORMULARY Inject 5 Units under the skin 3 (three) times a day before meals Insulin pen needles, Starting Tue 4/23/2024, Historical Med      ondansetron (ZOFRAN-ODT) 4 mg disintegrating tablet Take 4 mg by mouth every 8 (eight) hours as needed, Starting Fri 1/24/2025, Historical Med      rizatriptan (MAXALT) 10 mg tablet Take 10 mg by mouth daily as needed, Starting Mon 3/17/2025, Historical Med      semaglutide, 0.25 or 0.5 mg/dose, (Ozempic, 0.25 or 0.5 MG/DOSE,) 2 mg/1.5 mL injection pen Inject 1 mg under the skin every 7 days, Historical Med      sertraline (ZOLOFT) 100 mg tablet Take 100 mg by mouth daily, Historical Med       !! - Potential duplicate medications found. Please discuss with provider.        No discharge procedures on file.  ED SEPSIS DOCUMENTATION   Time reflects when diagnosis was documented in both MDM as applicable and the Disposition within this note       Time User Action Codes Description Comment    5/3/2025  9:13 PM Nevin Lai Add [H02.842] Swelling of right lower eyelid                  Nevin Lai PA-C  05/04/25 0414

## 2025-07-22 ENCOUNTER — RA CDI HCC (OUTPATIENT)
Dept: OTHER | Facility: HOSPITAL | Age: 40
End: 2025-07-22

## 2025-07-22 NOTE — PROGRESS NOTES
HCC coding opportunities          Chart Reviewed number of suggestions sent to Provider: 1     Patients Insurance        Commercial Insurance: [employer]       [facility]     K21.9 ( GERD)     Found in active problem list

## 2025-07-30 ENCOUNTER — VBI (OUTPATIENT)
Dept: ADMINISTRATIVE | Facility: OTHER | Age: 40
End: 2025-07-30

## 2025-08-05 ENCOUNTER — VBI (OUTPATIENT)
Dept: ADMINISTRATIVE | Facility: OTHER | Age: 40
End: 2025-08-05